# Patient Record
Sex: MALE | Race: WHITE | NOT HISPANIC OR LATINO | Employment: OTHER | URBAN - NONMETROPOLITAN AREA
[De-identification: names, ages, dates, MRNs, and addresses within clinical notes are randomized per-mention and may not be internally consistent; named-entity substitution may affect disease eponyms.]

---

## 2017-05-18 ENCOUNTER — OFFICE VISIT (OUTPATIENT)
Dept: GASTROENTEROLOGY | Facility: CLINIC | Age: 77
End: 2017-05-18

## 2017-05-18 VITALS
SYSTOLIC BLOOD PRESSURE: 132 MMHG | WEIGHT: 184 LBS | DIASTOLIC BLOOD PRESSURE: 72 MMHG | HEART RATE: 60 BPM | HEIGHT: 72 IN | TEMPERATURE: 96.9 F | OXYGEN SATURATION: 98 % | BODY MASS INDEX: 24.92 KG/M2

## 2017-05-18 DIAGNOSIS — K21.9 GASTROESOPHAGEAL REFLUX DISEASE, ESOPHAGITIS PRESENCE NOT SPECIFIED: Primary | ICD-10-CM

## 2017-05-18 PROCEDURE — 99213 OFFICE O/P EST LOW 20 MIN: CPT | Performed by: NURSE PRACTITIONER

## 2017-05-18 RX ORDER — BISOPROLOL FUMARATE AND HYDROCHLOROTHIAZIDE 5; 6.25 MG/1; MG/1
1 TABLET ORAL DAILY
COMMUNITY
End: 2020-05-04

## 2017-05-18 RX ORDER — FENOFIBRATE 160 MG/1
160 TABLET ORAL DAILY
COMMUNITY

## 2017-05-18 RX ORDER — NAPROXEN SODIUM 220 MG
220 TABLET ORAL DAILY
COMMUNITY
End: 2020-05-04

## 2017-05-18 RX ORDER — TRAMADOL HYDROCHLORIDE 50 MG/1
1 TABLET ORAL ONCE
COMMUNITY

## 2017-05-18 RX ORDER — OMEPRAZOLE 20 MG/1
20 CAPSULE, DELAYED RELEASE ORAL DAILY
COMMUNITY
End: 2017-05-18 | Stop reason: SDUPTHER

## 2017-05-18 RX ORDER — OMEPRAZOLE 20 MG/1
20 CAPSULE, DELAYED RELEASE ORAL DAILY
Qty: 30 CAPSULE | Refills: 11 | Status: SHIPPED | OUTPATIENT
Start: 2017-05-18 | End: 2018-04-27 | Stop reason: SDUPTHER

## 2017-05-22 DIAGNOSIS — R39.12 POOR URINARY STREAM: ICD-10-CM

## 2017-05-22 DIAGNOSIS — N13.8 BPH (BENIGN PROSTATIC HYPERTROPHY) WITH URINARY OBSTRUCTION: ICD-10-CM

## 2017-05-22 DIAGNOSIS — N40.1 BPH (BENIGN PROSTATIC HYPERTROPHY) WITH URINARY OBSTRUCTION: ICD-10-CM

## 2017-05-22 RX ORDER — TAMSULOSIN HYDROCHLORIDE 0.4 MG/1
CAPSULE ORAL
Qty: 30 CAPSULE | Refills: 3 | Status: SHIPPED | OUTPATIENT
Start: 2017-05-22 | End: 2017-09-19 | Stop reason: SDUPTHER

## 2017-09-13 DIAGNOSIS — N40.1 BPH (BENIGN PROSTATIC HYPERTROPHY) WITH URINARY OBSTRUCTION: Primary | ICD-10-CM

## 2017-09-13 DIAGNOSIS — N13.8 BPH (BENIGN PROSTATIC HYPERTROPHY) WITH URINARY OBSTRUCTION: Primary | ICD-10-CM

## 2017-09-13 LAB — PSA SERPL-MCNC: 0.52 NG/ML (ref 0–4)

## 2017-09-19 ENCOUNTER — OFFICE VISIT (OUTPATIENT)
Dept: UROLOGY | Facility: CLINIC | Age: 77
End: 2017-09-19

## 2017-09-19 VITALS
TEMPERATURE: 98.6 F | SYSTOLIC BLOOD PRESSURE: 128 MMHG | HEIGHT: 73 IN | BODY MASS INDEX: 24.78 KG/M2 | WEIGHT: 187 LBS | DIASTOLIC BLOOD PRESSURE: 90 MMHG

## 2017-09-19 DIAGNOSIS — N13.8 BPH (BENIGN PROSTATIC HYPERTROPHY) WITH URINARY OBSTRUCTION: Primary | ICD-10-CM

## 2017-09-19 DIAGNOSIS — N40.1 BPH (BENIGN PROSTATIC HYPERTROPHY) WITH URINARY OBSTRUCTION: Primary | ICD-10-CM

## 2017-09-19 DIAGNOSIS — R39.12 POOR URINARY STREAM: ICD-10-CM

## 2017-09-19 DIAGNOSIS — R97.20 ELEVATED PSA: ICD-10-CM

## 2017-09-19 LAB
BILIRUB BLD-MCNC: NEGATIVE MG/DL
CLARITY, POC: CLEAR
COLOR UR: YELLOW
GLUCOSE UR STRIP-MCNC: NEGATIVE MG/DL
KETONES UR QL: NEGATIVE
LEUKOCYTE EST, POC: NEGATIVE
NITRITE UR-MCNC: NEGATIVE MG/ML
PH UR: 5.5 [PH] (ref 5–8)
PROT UR STRIP-MCNC: NEGATIVE MG/DL
RBC # UR STRIP: NEGATIVE /UL
SP GR UR: 1.03 (ref 1–1.03)
UROBILINOGEN UR QL: NORMAL

## 2017-09-19 PROCEDURE — 81003 URINALYSIS AUTO W/O SCOPE: CPT | Performed by: UROLOGY

## 2017-09-19 PROCEDURE — 99213 OFFICE O/P EST LOW 20 MIN: CPT | Performed by: UROLOGY

## 2017-09-19 RX ORDER — TAMSULOSIN HYDROCHLORIDE 0.4 MG/1
1 CAPSULE ORAL DAILY
Qty: 90 CAPSULE | Refills: 5 | Status: SHIPPED | OUTPATIENT
Start: 2017-09-19 | End: 2018-09-25 | Stop reason: SDUPTHER

## 2017-09-19 NOTE — PROGRESS NOTES
Subjective    Mr. Velez is 77 y.o. male    CHIEF COMPLAINT: BPH    The patient comes back today follow-up of BPH clinically he is doing very very well he is very pleased with how things are going he does take the Flomax and feels that is helping him significantly he denies any gross hematuria there is no flank pain no recurrent urinary tract infections and his symptoms have been stable since we last saw him as a way score today is quite low at 2.    He does have a previous history of elevated PSA but his PSA today is 0.5 so again is quite low he has not had a previous biopsy but again obviously at this level would not recommend what his exam last year was perfectly normal.    He also has a history of urgency and some nocturia but again that is been stable as well      History of Present Illness      The following portions of the patient's history were reviewed and updated as appropriate: allergies, current medications, past family history, past medical history, past social history, past surgical history and problem list.    Review of Systems   Constitutional: Negative.  Negative for chills and fever.   Gastrointestinal: Negative for abdominal distention, abdominal pain, anal bleeding, blood in stool and nausea.   Genitourinary: Negative for difficulty urinating, dysuria, flank pain, frequency, hematuria and urgency.   Psychiatric/Behavioral: Negative.  Negative for agitation and confusion.         Current Outpatient Prescriptions:   •  bisoprolol-hydrochlorothiazide (ZIAC) 5-6.25 MG per tablet, Take 1 tablet by mouth Daily., Disp: , Rfl:   •  CALCIUM PO, Take  by mouth Daily., Disp: , Rfl:   •  fenofibrate 160 MG tablet, Take 160 mg by mouth Daily., Disp: , Rfl:   •  naproxen sodium (ALEVE) 220 MG tablet, Take 220 mg by mouth As Needed for Mild Pain (1-3)., Disp: , Rfl:   •  omeprazole (priLOSEC) 20 MG capsule, Take 1 capsule by mouth Daily., Disp: 30 capsule, Rfl: 11  •  tamsulosin (FLOMAX) 0.4 MG capsule 24 hr  "capsule, Take 1 capsule by mouth Daily., Disp: 90 capsule, Rfl: 5  •  TRAMADOL HCL PO, Take  by mouth., Disp: , Rfl:     Past Medical History:   Diagnosis Date   • Arthritis    • Diverticulosis    • Dysphagia    • GERD (gastroesophageal reflux disease)    • Hematochezia    • Hyperlipidemia    • Hypertension    • Plantar fasciitis    • Shingles    • Urticaria        Past Surgical History:   Procedure Laterality Date   • COLONOSCOPY  06/02/2016    5yr colon recall   • ENDOSCOPY  06/14/2011   • INGUINAL HERNIA REPAIR     • REPLACEMENT TOTAL KNEE     • SHOULDER SURGERY Bilateral        Social History     Social History   • Marital status:      Spouse name: N/A   • Number of children: N/A   • Years of education: N/A     Social History Main Topics   • Smoking status: Former Smoker   • Smokeless tobacco: Never Used   • Alcohol use Yes      Comment: occ   • Drug use: No   • Sexual activity: Not Asked     Other Topics Concern   • None     Social History Narrative       Family History   Problem Relation Age of Onset   • Crohn's disease Son    • No Known Problems Father    • No Known Problems Mother    • Colon cancer Neg Hx    • Colon polyps Neg Hx        Objective    /90  Temp 98.6 °F (37 °C)  Ht 73\" (185.4 cm)  Wt 187 lb (84.8 kg)  BMI 24.67 kg/m2    Physical Exam      Orders Only on 09/13/2017   Component Date Value Ref Range Status   • PSA 09/13/2017 0.520  0.000 - 4.000 ng/mL Final       Results for orders placed or performed in visit on 09/19/17   POC Urinalysis Dipstick, Automated   Result Value Ref Range    Color Yellow Yellow, Straw, Dark Yellow, Tamara    Clarity, UA Clear Clear    Glucose, UA Negative Negative, 1000 mg/dL (3+) mg/dL    Bilirubin Negative Negative    Ketones, UA Negative Negative    Specific Gravity  1.030 1.005 - 1.030    Blood, UA Negative Negative    pH, Urine 5.5 5.0 - 8.0    Protein, POC Negative Negative mg/dL    Urobilinogen, UA Normal Normal    Leukocytes Negative Negative    " Nitrite, UA Negative Negative       Assessment and Plan    Fabian was seen today for benign prostatic hypertrophy.    Diagnoses and all orders for this visit:    BPH (benign prostatic hypertrophy) with urinary obstruction  -     POC Urinalysis Dipstick, Automated  -     tamsulosin (FLOMAX) 0.4 MG capsule 24 hr capsule; Take 1 capsule by mouth Daily.    Poor urinary stream  -     tamsulosin (FLOMAX) 0.4 MG capsule 24 hr capsule; Take 1 capsule by mouth Daily.    Elevated PSA  -     PSA; Future    Plan--the patient seems doing quite well as far as his voiding symptoms I did refill his Flomax for him today some was seen back again in a year for his elevated PSA and repeated at that point of the has any difficulties prior then he will let me know

## 2017-09-24 ENCOUNTER — RESULTS ENCOUNTER (OUTPATIENT)
Dept: UROLOGY | Facility: CLINIC | Age: 77
End: 2017-09-24

## 2017-09-24 DIAGNOSIS — R97.20 ELEVATED PSA: ICD-10-CM

## 2018-03-23 ENCOUNTER — HOSPITAL ENCOUNTER (OUTPATIENT)
Dept: PREADMISSION TESTING | Age: 78
Setting detail: OUTPATIENT SURGERY
Discharge: HOME | End: 2018-03-27

## 2018-03-23 VITALS — WEIGHT: 175 LBS | BODY MASS INDEX: 23.7 KG/M2 | HEIGHT: 72 IN

## 2018-03-23 RX ORDER — DICLOFENAC SODIUM 75 MG/1
75 TABLET, DELAYED RELEASE ORAL DAILY
COMMUNITY
End: 2021-06-28

## 2018-03-23 RX ORDER — TAMSULOSIN HYDROCHLORIDE 0.4 MG/1
0.4 CAPSULE ORAL DAILY
COMMUNITY
End: 2021-03-10

## 2018-03-23 RX ORDER — FENOFIBRATE 160 MG/1
160 TABLET ORAL DAILY
COMMUNITY

## 2018-03-26 ENCOUNTER — ANESTHESIA EVENT (OUTPATIENT)
Dept: OPERATING ROOM | Age: 78
End: 2018-03-26

## 2018-03-29 ENCOUNTER — ANESTHESIA (OUTPATIENT)
Dept: OPERATING ROOM | Age: 78
End: 2018-03-29

## 2018-03-29 ENCOUNTER — HOSPITAL ENCOUNTER (OUTPATIENT)
Dept: GENERAL RADIOLOGY | Age: 78
Discharge: HOME | End: 2018-03-29

## 2018-03-29 ENCOUNTER — HOSPITAL ENCOUNTER (OUTPATIENT)
Age: 78
Setting detail: OUTPATIENT SURGERY
Discharge: HOME | End: 2018-03-29
Attending: ORTHOPAEDIC SURGERY | Admitting: ORTHOPAEDIC SURGERY

## 2018-03-29 VITALS
TEMPERATURE: 97.6 F | BODY MASS INDEX: 23.7 KG/M2 | HEART RATE: 56 BPM | WEIGHT: 175 LBS | HEIGHT: 72 IN | SYSTOLIC BLOOD PRESSURE: 124 MMHG | RESPIRATION RATE: 18 BRPM | DIASTOLIC BLOOD PRESSURE: 82 MMHG | OXYGEN SATURATION: 95 %

## 2018-03-29 VITALS — SYSTOLIC BLOOD PRESSURE: 116 MMHG | OXYGEN SATURATION: 95 % | DIASTOLIC BLOOD PRESSURE: 70 MMHG

## 2018-03-29 DIAGNOSIS — M25.552 LEFT HIP PAIN: ICD-10-CM

## 2018-03-29 PROCEDURE — G8907 PT DOC NO EVENTS ON DISCHARG: HCPCS

## 2018-03-29 PROCEDURE — 20610 DRAIN/INJ JOINT/BURSA W/O US: CPT

## 2018-03-29 PROCEDURE — G8918 PT W/O PREOP ORDER IV AB PRO: HCPCS

## 2018-03-29 PROCEDURE — 3209999900 FLUORO FOR SURGICAL PROCEDURES

## 2018-03-29 RX ORDER — ENALAPRILAT 2.5 MG/2ML
1.25 INJECTION INTRAVENOUS
Status: DISCONTINUED | OUTPATIENT
Start: 2018-03-29 | End: 2018-03-29 | Stop reason: HOSPADM

## 2018-03-29 RX ORDER — LIDOCAINE HYDROCHLORIDE 10 MG/ML
INJECTION, SOLUTION EPIDURAL; INFILTRATION; INTRACAUDAL; PERINEURAL PRN
Status: DISCONTINUED | OUTPATIENT
Start: 2018-03-29 | End: 2018-03-29 | Stop reason: HOSPADM

## 2018-03-29 RX ORDER — LABETALOL HYDROCHLORIDE 5 MG/ML
5 INJECTION, SOLUTION INTRAVENOUS EVERY 10 MIN PRN
Status: DISCONTINUED | OUTPATIENT
Start: 2018-03-29 | End: 2018-03-29 | Stop reason: HOSPADM

## 2018-03-29 RX ORDER — HYDROCODONE BITARTRATE AND ACETAMINOPHEN 5; 325 MG/1; MG/1
1 TABLET ORAL PRN
Status: DISCONTINUED | OUTPATIENT
Start: 2018-03-29 | End: 2018-03-29 | Stop reason: HOSPADM

## 2018-03-29 RX ORDER — PROMETHAZINE HYDROCHLORIDE 25 MG/ML
6.25 INJECTION, SOLUTION INTRAMUSCULAR; INTRAVENOUS
Status: DISCONTINUED | OUTPATIENT
Start: 2018-03-29 | End: 2018-03-29 | Stop reason: HOSPADM

## 2018-03-29 RX ORDER — HYDROCODONE BITARTRATE AND ACETAMINOPHEN 7.5; 325 MG/1; MG/1
1 TABLET ORAL EVERY 4 HOURS PRN
Qty: 10 TABLET | Refills: 0 | Status: SHIPPED | OUTPATIENT
Start: 2018-03-29 | End: 2018-04-05

## 2018-03-29 RX ORDER — DIPHENHYDRAMINE HYDROCHLORIDE 50 MG/ML
12.5 INJECTION INTRAMUSCULAR; INTRAVENOUS
Status: DISCONTINUED | OUTPATIENT
Start: 2018-03-29 | End: 2018-03-29 | Stop reason: HOSPADM

## 2018-03-29 RX ORDER — PROPOFOL 10 MG/ML
INJECTION, EMULSION INTRAVENOUS PRN
Status: DISCONTINUED | OUTPATIENT
Start: 2018-03-29 | End: 2018-03-29 | Stop reason: SDUPTHER

## 2018-03-29 RX ORDER — HYDROMORPHONE HCL 110MG/55ML
0.25 PATIENT CONTROLLED ANALGESIA SYRINGE INTRAVENOUS EVERY 5 MIN PRN
Status: DISCONTINUED | OUTPATIENT
Start: 2018-03-29 | End: 2018-03-29 | Stop reason: HOSPADM

## 2018-03-29 RX ORDER — SODIUM CHLORIDE, SODIUM LACTATE, POTASSIUM CHLORIDE, CALCIUM CHLORIDE 600; 310; 30; 20 MG/100ML; MG/100ML; MG/100ML; MG/100ML
INJECTION, SOLUTION INTRAVENOUS CONTINUOUS
Status: DISCONTINUED | OUTPATIENT
Start: 2018-03-29 | End: 2018-03-29 | Stop reason: HOSPADM

## 2018-03-29 RX ORDER — METOCLOPRAMIDE HYDROCHLORIDE 5 MG/ML
10 INJECTION INTRAMUSCULAR; INTRAVENOUS
Status: DISCONTINUED | OUTPATIENT
Start: 2018-03-29 | End: 2018-03-29 | Stop reason: HOSPADM

## 2018-03-29 RX ORDER — MORPHINE SULFATE 10 MG/ML
2 INJECTION, SOLUTION INTRAMUSCULAR; INTRAVENOUS EVERY 5 MIN PRN
Status: DISCONTINUED | OUTPATIENT
Start: 2018-03-29 | End: 2018-03-29 | Stop reason: HOSPADM

## 2018-03-29 RX ORDER — LIDOCAINE HYDROCHLORIDE 10 MG/ML
INJECTION, SOLUTION EPIDURAL; INFILTRATION; INTRACAUDAL; PERINEURAL PRN
Status: DISCONTINUED | OUTPATIENT
Start: 2018-03-29 | End: 2018-03-29 | Stop reason: SDUPTHER

## 2018-03-29 RX ORDER — MORPHINE SULFATE 10 MG/ML
4 INJECTION, SOLUTION INTRAMUSCULAR; INTRAVENOUS EVERY 5 MIN PRN
Status: DISCONTINUED | OUTPATIENT
Start: 2018-03-29 | End: 2018-03-29 | Stop reason: HOSPADM

## 2018-03-29 RX ORDER — HYDRALAZINE HYDROCHLORIDE 20 MG/ML
5 INJECTION INTRAMUSCULAR; INTRAVENOUS EVERY 10 MIN PRN
Status: DISCONTINUED | OUTPATIENT
Start: 2018-03-29 | End: 2018-03-29 | Stop reason: HOSPADM

## 2018-03-29 RX ORDER — MEPERIDINE HYDROCHLORIDE 25 MG/ML
12.5 INJECTION INTRAMUSCULAR; INTRAVENOUS; SUBCUTANEOUS EVERY 5 MIN PRN
Status: DISCONTINUED | OUTPATIENT
Start: 2018-03-29 | End: 2018-03-29 | Stop reason: HOSPADM

## 2018-03-29 RX ORDER — LIDOCAINE HYDROCHLORIDE 10 MG/ML
1 INJECTION, SOLUTION EPIDURAL; INFILTRATION; INTRACAUDAL; PERINEURAL
Status: DISCONTINUED | OUTPATIENT
Start: 2018-03-29 | End: 2018-03-29 | Stop reason: HOSPADM

## 2018-03-29 RX ORDER — HYDROCODONE BITARTRATE AND ACETAMINOPHEN 5; 325 MG/1; MG/1
2 TABLET ORAL PRN
Status: DISCONTINUED | OUTPATIENT
Start: 2018-03-29 | End: 2018-03-29 | Stop reason: HOSPADM

## 2018-03-29 RX ORDER — HYDROMORPHONE HCL 110MG/55ML
0.5 PATIENT CONTROLLED ANALGESIA SYRINGE INTRAVENOUS EVERY 5 MIN PRN
Status: DISCONTINUED | OUTPATIENT
Start: 2018-03-29 | End: 2018-03-29 | Stop reason: HOSPADM

## 2018-03-29 RX ORDER — DEXAMETHASONE SODIUM PHOSPHATE 10 MG/ML
INJECTION INTRAMUSCULAR; INTRAVENOUS PRN
Status: DISCONTINUED | OUTPATIENT
Start: 2018-03-29 | End: 2018-03-29 | Stop reason: HOSPADM

## 2018-03-29 RX ORDER — BUPIVACAINE HYDROCHLORIDE 5 MG/ML
INJECTION, SOLUTION EPIDURAL; INTRACAUDAL PRN
Status: DISCONTINUED | OUTPATIENT
Start: 2018-03-29 | End: 2018-03-29 | Stop reason: HOSPADM

## 2018-03-29 RX ADMIN — LIDOCAINE HYDROCHLORIDE 5 ML: 10 INJECTION, SOLUTION EPIDURAL; INFILTRATION; INTRACAUDAL; PERINEURAL at 08:25

## 2018-03-29 RX ADMIN — PROPOFOL 70 MG: 10 INJECTION, EMULSION INTRAVENOUS at 08:25

## 2018-03-29 RX ADMIN — SODIUM CHLORIDE, SODIUM LACTATE, POTASSIUM CHLORIDE, CALCIUM CHLORIDE: 600; 310; 30; 20 INJECTION, SOLUTION INTRAVENOUS at 07:30

## 2018-03-29 RX ADMIN — PROPOFOL 100 MG: 10 INJECTION, EMULSION INTRAVENOUS at 08:28

## 2018-03-29 NOTE — ANESTHESIA PRE PROCEDURE
ARTHROPLASTY      left       Social History:    Social History   Substance Use Topics    Smoking status: Never Smoker    Smokeless tobacco: Current User     Types: Snuff    Alcohol use Yes      Comment: beer- mod                                Ready to quit: Not Answered  Counseling given: Not Answered      Vital Signs (Current):   Vitals:    03/29/18 0724   BP: (!) 148/95   Pulse: 67   Resp: 18   Temp: 97.8 °F (36.6 °C)   SpO2: 95%   Weight: 175 lb (79.4 kg)   Height: 6' (1.829 m)                                              BP Readings from Last 3 Encounters:   03/29/18 (!) 148/95       NPO Status: Time of last liquid consumption: 2300                        Time of last solid consumption: 2300                        Date of last liquid consumption: 03/28/18                        Date of last solid food consumption: 03/28/18    BMI:   Wt Readings from Last 3 Encounters:   03/29/18 175 lb (79.4 kg)   03/23/18 175 lb (79.4 kg)     Body mass index is 23.73 kg/m². CBC: No results found for: WBC, RBC, HGB, HCT, MCV, RDW, PLT    CMP: No results found for: NA, K, CL, CO2, BUN, CREATININE, GFRAA, AGRATIO, LABGLOM, GLUCOSE, PROT, CALCIUM, BILITOT, ALKPHOS, AST, ALT    POC Tests: No results for input(s): POCGLU, POCNA, POCK, POCCL, POCBUN, POCHEMO, POCHCT in the last 72 hours. Coags: No results found for: PROTIME, INR, APTT    HCG (If Applicable): No results found for: PREGTESTUR, PREGSERUM, HCG, HCGQUANT     ABGs: No results found for: PHART, PO2ART, IKX8RIY, APB3DFF, BEART, [id number]     Type & Screen (If Applicable):  No results found for: LABABO, [address]    Anesthesia Evaluation  Patient summary reviewed and Nursing notes reviewed no history of anesthetic complications:   Airway: Mallampati: II        Dental:    (+) edentulous      Pulmonary:Negative Pulmonary ROS and normal exam              Patient did not smoke on day of surgery.                  Cardiovascular:    (+) hypertension: no interval change, hyperlipidemia         Beta Blocker:  Not on Beta Blocker         Neuro/Psych:   Negative Neuro/Psych ROS              GI/Hepatic/Renal:   (+) GERD:,           Endo/Other: Negative Endo/Other ROS             Pt had PAT visit. Abdominal:           Vascular: negative vascular ROS. Anesthesia Plan      MAC     ASA 2       Induction: intravenous. Anesthetic plan and risks discussed with patient and spouse.                       Kyle Doan CRNA   3/29/2018

## 2018-03-30 NOTE — ANESTHESIA POSTPROCEDURE EVALUATION
Department of Anesthesiology  Postprocedure Note    Patient: Leonel Caballero  MRN: [id number]  YOB: 1940  Date of evaluation: 3/30/2018  Time:  9:03 AM     Procedure Summary     Date:  03/29/18 Room / Location:  [facility] OR  / [facility] OR    Anesthesia Start:  5628 Anesthesia Stop:  4076    Procedure:  HIP INJECTION (Left Wrist) Diagnosis:  ( )    Surgeon:  Mohamud Samson DO Responsible Provider:  Radha Keene CRNA    Anesthesia Type:  MAC ASA Status:  2          Anesthesia Type: MAC    Thelma Phase I:      Thelma Phase II: Thelma Score: 10    Last vitals: Reviewed and per EMR flowsheets.        Anesthesia Post Evaluation    Patient location during evaluation: bedside  Patient participation: complete - patient participated  Level of consciousness: responsive to light touch  Pain score: 0  Airway patency: patent  Nausea & Vomiting: no vomiting and no nausea  Complications: no  Cardiovascular status: hemodynamically stable  Respiratory status: acceptable and room air  Hydration status: stable

## 2018-04-30 RX ORDER — OMEPRAZOLE 20 MG/1
CAPSULE, DELAYED RELEASE ORAL
Qty: 30 CAPSULE | Refills: 0 | Status: SHIPPED | OUTPATIENT
Start: 2018-04-30 | End: 2018-05-30 | Stop reason: SDUPTHER

## 2018-05-30 RX ORDER — OMEPRAZOLE 20 MG/1
20 CAPSULE, DELAYED RELEASE ORAL DAILY
Qty: 30 CAPSULE | Refills: 1 | Status: SHIPPED | OUTPATIENT
Start: 2018-05-30 | End: 2018-07-24 | Stop reason: SDUPTHER

## 2018-07-10 ENCOUNTER — ANESTHESIA EVENT (OUTPATIENT)
Dept: OPERATING ROOM | Age: 78
End: 2018-07-10

## 2018-07-12 ENCOUNTER — ANESTHESIA (OUTPATIENT)
Dept: OPERATING ROOM | Age: 78
End: 2018-07-12

## 2018-07-12 ENCOUNTER — HOSPITAL ENCOUNTER (OUTPATIENT)
Age: 78
Setting detail: OUTPATIENT SURGERY
Discharge: HOME | End: 2018-07-12
Attending: ORTHOPAEDIC SURGERY | Admitting: ORTHOPAEDIC SURGERY

## 2018-07-12 ENCOUNTER — HOSPITAL ENCOUNTER (OUTPATIENT)
Dept: GENERAL RADIOLOGY | Age: 78
Discharge: HOME | End: 2018-07-12

## 2018-07-12 VITALS — DIASTOLIC BLOOD PRESSURE: 75 MMHG | OXYGEN SATURATION: 97 % | SYSTOLIC BLOOD PRESSURE: 117 MMHG

## 2018-07-12 VITALS
WEIGHT: 182 LBS | OXYGEN SATURATION: 96 % | HEART RATE: 58 BPM | HEIGHT: 72 IN | RESPIRATION RATE: 18 BRPM | TEMPERATURE: 97.5 F | SYSTOLIC BLOOD PRESSURE: 132 MMHG | BODY MASS INDEX: 24.65 KG/M2 | DIASTOLIC BLOOD PRESSURE: 73 MMHG

## 2018-07-12 DIAGNOSIS — M16.9 OSTEOARTHROSIS, HIP: Primary | ICD-10-CM

## 2018-07-12 DIAGNOSIS — M25.552 LEFT HIP PAIN: ICD-10-CM

## 2018-07-12 PROCEDURE — G8918 PT W/O PREOP ORDER IV AB PRO: HCPCS

## 2018-07-12 PROCEDURE — G8907 PT DOC NO EVENTS ON DISCHARG: HCPCS

## 2018-07-12 PROCEDURE — 20610 DRAIN/INJ JOINT/BURSA W/O US: CPT

## 2018-07-12 PROCEDURE — 3209999900 FLUORO FOR SURGICAL PROCEDURES

## 2018-07-12 RX ORDER — HYDROCODONE BITARTRATE AND ACETAMINOPHEN 7.5; 325 MG/1; MG/1
1 TABLET ORAL EVERY 4 HOURS PRN
Qty: 10 TABLET | Refills: 0 | Status: SHIPPED | OUTPATIENT
Start: 2018-07-12 | End: 2018-07-19

## 2018-07-12 RX ORDER — LIDOCAINE HYDROCHLORIDE 10 MG/ML
INJECTION, SOLUTION INFILTRATION; PERINEURAL PRN
Status: DISCONTINUED | OUTPATIENT
Start: 2018-07-12 | End: 2018-07-12 | Stop reason: SDUPTHER

## 2018-07-12 RX ORDER — SODIUM CHLORIDE, SODIUM LACTATE, POTASSIUM CHLORIDE, CALCIUM CHLORIDE 600; 310; 30; 20 MG/100ML; MG/100ML; MG/100ML; MG/100ML
INJECTION, SOLUTION INTRAVENOUS CONTINUOUS
Status: DISCONTINUED | OUTPATIENT
Start: 2018-07-12 | End: 2018-07-12 | Stop reason: HOSPADM

## 2018-07-12 RX ORDER — PROPOFOL 10 MG/ML
INJECTION, EMULSION INTRAVENOUS PRN
Status: DISCONTINUED | OUTPATIENT
Start: 2018-07-12 | End: 2018-07-12 | Stop reason: SDUPTHER

## 2018-07-12 RX ADMIN — SODIUM CHLORIDE, SODIUM LACTATE, POTASSIUM CHLORIDE, CALCIUM CHLORIDE: 600; 310; 30; 20 INJECTION, SOLUTION INTRAVENOUS at 08:02

## 2018-07-12 RX ADMIN — PROPOFOL 100 MG: 10 INJECTION, EMULSION INTRAVENOUS at 10:03

## 2018-07-12 RX ADMIN — LIDOCAINE HYDROCHLORIDE 50 MG: 10 INJECTION, SOLUTION INFILTRATION; PERINEURAL at 10:03

## 2018-07-12 NOTE — ANESTHESIA PRE PROCEDURE
Department of Anesthesiology  Preprocedure Note       Name:  Beth Brown   Age:  69 y.o.  :  1940                                          MRN:  [id number]         Date:  2018      Surgeon: Ovidio Gutiérrez):  Kirstie Orellana DO    Procedure: Procedure(s):  HIP INJECTION    Medications prior to admission:   Prior to Admission medications    Medication Sig Start Date End Date Taking? Authorizing Provider   Naproxen Sodium (ALEVE PO) Take by mouth daily    Historical Provider, MD   fenofibrate 160 MG tablet Take 160 mg by mouth daily    Historical Provider, MD   TRAMADOL HCL PO Take by mouth. Historical Provider, MD   diclofenac (VOLTAREN) 75 MG EC tablet Take 75 mg by mouth daily    Historical Provider, MD   MAGNESIUM PO Take by mouth    Historical Provider, MD   tamsulosin (FLOMAX) 0.4 MG capsule Take 0.4 mg by mouth daily    Historical Provider, MD   omeprazole (PRILOSEC) 20 MG capsule Take 20 mg by mouth daily. Historical Provider, MD   bisoprolol-hydrochlorothiazide [facility]) 5-6.25 MG per tablet Take 1 tablet by mouth daily. Historical Provider, MD       Current medications:    No current facility-administered medications for this visit. No current outpatient prescriptions on file. Facility-Administered Medications Ordered in Other Visits   Medication Dose Route Frequency Provider Last Rate Last Dose    lactated ringers infusion   Intravenous Continuous Kirstie Orellana  mL/hr at 18 0802         Allergies:     Allergies   Allergen Reactions    Red Dye        Problem List:    Patient Active Problem List   Diagnosis Code    Osteoarthrosis, hip M16.9    Bone spur M77.9    Hip pain, left M25.552       Past Medical History:        Diagnosis Date    DJD (degenerative joint disease) of hip     GERD (gastroesophageal reflux disease)     Hypertension        Past Surgical History:        Procedure Laterality Date    CHG FLUOROSCOPIC GUIDANCE NEEDLE PLACEMENT ADD ON Left 3/29/2018    HIP INJECTION performed by Lorne Isaac DO at [address] [facility] ARTHROSCOPY      bilateral    TOTAL KNEE ARTHROPLASTY      left       Social History:    Social History   Substance Use Topics    Smoking status: Never Smoker    Smokeless tobacco: Current User     Types: Snuff    Alcohol use Yes      Comment: beer- mod                                Ready to quit: Not Answered  Counseling given: Not Answered      Vital Signs (Current): There were no vitals filed for this visit. BP Readings from Last 3 Encounters:   07/12/18 (!) 147/93   03/29/18 116/70   03/29/18 124/82       NPO Status:                                                                                 BMI:   Wt Readings from Last 3 Encounters:   07/12/18 182 lb (82.6 kg)   03/29/18 175 lb (79.4 kg)   03/23/18 175 lb (79.4 kg)     There is no height or weight on file to calculate BMI.    CBC: No results found for: WBC, RBC, HGB, HCT, MCV, RDW, PLT    CMP: No results found for: NA, K, CL, CO2, BUN, CREATININE, GFRAA, AGRATIO, LABGLOM, GLUCOSE, PROT, CALCIUM, BILITOT, ALKPHOS, AST, ALT    POC Tests: No results for input(s): POCGLU, POCNA, POCK, POCCL, POCBUN, POCHEMO, POCHCT in the last 72 hours. Coags: No results found for: PROTIME, INR, APTT    HCG (If Applicable): No results found for: PREGTESTUR, PREGSERUM, HCG, HCGQUANT     ABGs: No results found for: PHART, PO2ART, PEP3UWV, MDW0ESD, BEART, [id number]     Type & Screen (If Applicable):  No results found for: LABAB [facility]    Anesthesia Evaluation  Patient summary reviewed and Nursing notes reviewed no history of anesthetic complications:   Airway: Mallampati: II        Dental:    (+) edentulous, upper dentures and lower dentures      Pulmonary:Negative Pulmonary ROS and normal exam              Patient did not smoke on day of surgery.                  Cardiovascular:    (+) hypertension: no interval change,

## 2018-07-12 NOTE — OP NOTE
OPERATIVE NOTE  [facility] OF [address]      NAME OF SURGEON: Cherry Hutchinson D.O.    PATIENT:   Wendie Manley      Date: 7/12/2018            Time: 10:08 AM       PREOP DIAGNOSIS: Primary end-stage osteoarthritis left hip    POSTOP DIAGNOSIS:  Same     PROCEDURE: Corticosteroid and local anesthetic injection left hip with fluoroscopic visualization    IMPLANTS: * No implants in log *    FINDINGS: None  ASSISTANT: None  ANESTHESIA:  Conscious sedation with monitored anesthesia care  EBL: Minimal    FLUIDS: See anesthesia record  BLOOD PRODUCTS:  None  COMPLICATIONS:  None  SPECIMEN:  None        INDICATIONS:  Patient presents for the above procedure having failed conservative treatment. Patient consents to the procedure above understanding the risks of bleeding, infection, anesthesia, nerve injury, stiffness, and blood clots. Procedure in Detail: After informed consent was obtained patient operates suite and placed on the operating room table in the supine position. After successful induction of conscious sedation with monitored anesthesia care left hip was prepped in normal sterile fashion. With the assistance of fluoroscopic visualization a 18-gauge spinal needle was directed into the left hip joint. One mL of 1% lidocaine and 1 mL of quarter percent Marcaine and 1 mL of dexamethasone was injected into the left hip joint. Spinal needle was removed as entirety. Dressing was applied. Patient was transported to the recovery room in stable condition. Plan:   Activities as tolerated    Electronically signed by Cherry Hutchinson DO on 7/12/2018 at 10:08 AM

## 2018-07-12 NOTE — H&P
[facility] ([facility]) Pre-Operative History and Physical    Patient Name: Deborah Roawn  : 1940        Chief complaint: Right hip pain    History of Present Illness:   69 y.o. male seen and evaluated in the office with complaints of left hip pain    Radiographic and clinical evaluation reveals primary end-stage osteoarthritis left hip    Past Medical Hisotry:          Diagnosis Date    DJD (degenerative joint disease) of hip     GERD (gastroesophageal reflux disease)     Hypertension        Past Surgical History:          Procedure Laterality Date    CHG FLUOROSCOPIC GUIDANCE NEEDLE PLACEMENT ADD ON Left 3/29/2018    HIP INJECTION performed by Sonya Fierro DO at [address] [facility] ARTHROSCOPY      bilateral    TOTAL KNEE ARTHROPLASTY      left       Medications:      Prior to Admission medications    Medication Sig Start Date End Date Taking? Authorizing Provider   Naproxen Sodium (ALEVE PO) Take by mouth daily    Historical Provider, MD   fenofibrate 160 MG tablet Take 160 mg by mouth daily    Historical Provider, MD   TRAMADOL HCL PO Take by mouth. Historical Provider, MD   diclofenac (VOLTAREN) 75 MG EC tablet Take 75 mg by mouth daily    Historical Provider, MD   MAGNESIUM PO Take by mouth    Historical Provider, MD   tamsulosin (FLOMAX) 0.4 MG capsule Take 0.4 mg by mouth daily    Historical Provider, MD   omeprazole (PRILOSEC) 20 MG capsule Take 20 mg by mouth daily. Historical Provider, MD   bisoprolol-hydrochlorothiazide [facility]) 5-6.25 MG per tablet Take 1 tablet by mouth daily. Historical Provider, MD       Allergies:     Red dye    Social History:   Tobacco:  reports that he has never smoked. His smokeless tobacco use includes Snuff. Alcohol:  reports that he drinks alcohol.     Review of Systems:  General: Denies any fever or chills  EYES: Denies any diplopia  ENT: Tinnitus or vertigo  Resp: Denies any shortness of breath, cough or wheezing  Cardiac: Denies any chest pain, palpitations, claudication or edema  GI: Denies any melena, hematochezia, hematemesis or pyrosis  : Denies any frequency, urgency, hesitancy or incontinence  Musculoskeletal: Denies back pain, joint pain, myalgias  Heme: Denies bruising or bleeding easily  Endocrine: Denies any history of diabetes or thyroid disease  Psych: Denies anxiety or depression  Neuro: Denies any focal motor or sensory deficits      Physical Exam:  Vitals: There were no vitals taken for this visit. CONSTITUTIONAL: Alert, appropriate, no acute distress. PSYCH: mood and affect are normal with a normal rate and tone of speech  EYES: Non icteric, EOM intact, pupils equal round and reactive to light  ENT: Mucus membranes moist, no oral pharyngeal lesions, nares patent   NECK: Supple, no masses, no JVD, trachea mid line   CHEST/LUNGS: CTA bilaterally, normal respiratory effort   CARDIOVASCULAR: RRR, no murmurs,  2+ DP and radial pulses bilaterally  ABDOMEN: soft, nontender  EXTREMITIES: warm, well perfused, no edema. Joint with mildly reduced range of motion and generalized tenderness.   Neurovascular exam normal  SKIN: warm, dry with no rashes or lesions  LYMPH: No cervical or inguinal lymphadenopathy    Laboratory:  CBC :  No results found for: WBC, HGB, HCT, PLT  BMP: No results found for: NA, K, CL, CO2, BUN, LABALBU, CREATININE, CALCIUM, GFRAA, LABGLOM, GLUCOSE  PT/INR:  No results found for: PROTIME, INR  U/A: No results found for: NITRITE, WBCUA, RBCUA, BACTERIA  HgBA1c:  No components found for: HGBA1C    Radiology: Plain x-rays of the right hip reveal joint space narrowing subchondral cyst formation osteophyte formation consistent with end-stage primary osteoarthritis the right hip    IMPRESSION:  Primary end-stage osteoarthritis left hip    PLAN: A lengthy discussion was carried out with the patient and the patient has agreed to proceed with the purposed alternate operative procedure of corticosteroid and local anesthetic injection with fluoroscopic visualization left hip    Permit and pre-operative orders where completed in the office. Patient will be re-evaluated in the pre-operative holding area.         Provider: Zaki Joshua  Date: 7/12/2018

## 2018-07-24 ENCOUNTER — OFFICE VISIT (OUTPATIENT)
Dept: GASTROENTEROLOGY | Facility: CLINIC | Age: 78
End: 2018-07-24

## 2018-07-24 VITALS
SYSTOLIC BLOOD PRESSURE: 130 MMHG | DIASTOLIC BLOOD PRESSURE: 70 MMHG | HEART RATE: 64 BPM | OXYGEN SATURATION: 98 % | HEIGHT: 72 IN | BODY MASS INDEX: 24.92 KG/M2 | TEMPERATURE: 97 F | WEIGHT: 184 LBS

## 2018-07-24 DIAGNOSIS — K21.9 GASTROESOPHAGEAL REFLUX DISEASE, ESOPHAGITIS PRESENCE NOT SPECIFIED: Primary | ICD-10-CM

## 2018-07-24 PROCEDURE — 99212 OFFICE O/P EST SF 10 MIN: CPT | Performed by: NURSE PRACTITIONER

## 2018-07-24 RX ORDER — OMEPRAZOLE 20 MG/1
20 CAPSULE, DELAYED RELEASE ORAL DAILY
Qty: 90 CAPSULE | Refills: 3 | Status: SHIPPED | OUTPATIENT
Start: 2018-07-24 | End: 2019-07-15 | Stop reason: SDUPTHER

## 2018-07-24 NOTE — PROGRESS NOTES
[facility] GASTROENTEROLOGY - OFFICE NOTE    7/24/2018    Fabian Velez Sr.   1940    Primary Physician: Kaleb Villavicencio MD    Chief Complaint   Patient presents with   • Heartburn         HISTORY OF PRESENT ILLNESS    Fabian Velez Sr. is a 77 y.o. male presents  with gerd. Taking omeprazole 20 mg daily with good control. No dysphagia or odynophagia. No n/v. No fever or chills. No abdominal pain.  No weight loss. Appetitie is good. Needs refill of omeprazole.  Does not take calcium suppl.          Ov 5-18-17 Fabian Velez Sr. is a 76 y.o. male presents with gerd. Gerd x 3 yrs. Stable now. On omeprazole 20 mg one by mouth every day with control. As dysphagia or odynophagia.  Denies nausea vomiting.  Denies hematemesis.  Denies fevers or chills.  Denies unintentional weight loss.  Last EGD was June 2011 which was normal.  He does need a refill of his omeprazole.    Past Medical History:   Diagnosis Date   • Arthritis    • Diverticulosis    • GERD (gastroesophageal reflux disease)    • Hyperlipidemia    • Hypertension    • Plantar fasciitis    • Shingles    • Urticaria        Past Surgical History:   Procedure Laterality Date   • COLONOSCOPY  06/02/2016    5yr colon recall   • ENDOSCOPY  06/14/2011   • INGUINAL HERNIA REPAIR     • REPLACEMENT TOTAL KNEE     • SHOULDER SURGERY Bilateral        Outpatient Prescriptions Marked as Taking for the 7/24/18 encounter (Office Visit) with TAI Fowler   Medication Sig Dispense Refill   • bisoprolol-hydrochlorothiazide (ZIAC) 5-6.25 MG per tablet Take 1 tablet by mouth Daily.     • DICLOFENAC PO Take  by mouth Daily.     • fenofibrate 160 MG tablet Take 160 mg by mouth As Needed.     • naproxen sodium (ALEVE) 220 MG tablet Take 220 mg by mouth Daily.     • omeprazole (priLOSEC) 20 MG capsule Take 1 capsule by mouth Daily. 90 capsule 3   • tamsulosin (FLOMAX) 0.4 MG capsule 24 hr capsule Take 1 capsule by mouth Daily. 90 capsule 5   • TRAMADOL HCL PO Take  by  mouth.     • [DISCONTINUED] omeprazole (priLOSEC) 20 MG capsule Take 1 capsule by mouth Daily. 30 capsule 1       Allergies   Allergen Reactions   • Contrast Dye Other (See Comments)     Pt can't recall       Social History     Social History   • Marital status:      Spouse name: N/A   • Number of children: N/A   • Years of education: N/A     Occupational History   • Not on file.     Social History Main Topics   • Smoking status: Former Smoker   • Smokeless tobacco: Never Used   • Alcohol use Yes      Comment: occ   • Drug use: No   • Sexual activity: Defer     Other Topics Concern   • Not on file     Social History Narrative   • No narrative on file       Family History   Problem Relation Age of Onset   • Crohn's disease Son    • No Known Problems Father    • No Known Problems Mother    • Colon cancer Neg Hx    • Colon polyps Neg Hx        Review of Systems   Constitutional: Negative for appetite change, chills, fatigue, fever and unexpected weight change.   HENT: Negative for sore throat and trouble swallowing.    Eyes: Negative for visual disturbance.   Respiratory: Negative for cough, chest tightness, shortness of breath and wheezing.    Cardiovascular: Negative for chest pain and palpitations.   Gastrointestinal: Negative for abdominal distention, abdominal pain, anal bleeding, blood in stool, constipation, diarrhea, nausea, rectal pain and vomiting.        As mentioned in hpi   Genitourinary: Negative for difficulty urinating and hematuria.   Musculoskeletal: Negative for arthralgias and back pain.   Skin: Negative for color change and rash.   Neurological: Negative for dizziness, seizures, syncope, light-headedness and headaches.   Hematological: Negative for adenopathy.   Psychiatric/Behavioral: Negative for confusion. The patient is not nervous/anxious.         Vitals:    07/24/18 1317   BP: 130/70   BP Location: Left arm   Patient Position: Sitting   Cuff Size: Adult   Pulse: 64   Temp: 97 °F (36.1  "°C)   SpO2: 98%   Weight: 83.5 kg (184 lb)   Height: 182.9 cm (72\")      Body mass index is 24.95 kg/m².    Physical Exam   Constitutional: He appears well-developed and well-nourished. No distress.   HENT:   Head: Normocephalic and atraumatic.   Eyes: EOM are normal. No scleral icterus.   Neck: Neck supple. No JVD present.   Cardiovascular: Normal rate, regular rhythm and normal heart sounds.    Pulmonary/Chest: Effort normal and breath sounds normal. No stridor.   Abdominal: Soft. Bowel sounds are normal. He exhibits no distension and no mass. There is no tenderness. There is no rebound and no guarding.   Musculoskeletal: Normal range of motion. He exhibits no deformity.   Neurological: He is alert.   Skin: Skin is warm and dry. No rash noted.   Psychiatric: He has a normal mood and affect. His behavior is normal.   Vitals reviewed.      Results for orders placed or performed in visit on 09/19/17   POC Urinalysis Dipstick, Automated   Result Value Ref Range    Color Yellow Yellow, Straw, Dark Yellow, Tamara    Clarity, UA Clear Clear    Glucose, UA Negative Negative, 1000 mg/dL (3+) mg/dL    Bilirubin Negative Negative    Ketones, UA Negative Negative    Specific Gravity  1.030 1.005 - 1.030    Blood, UA Negative Negative    pH, Urine 5.5 5.0 - 8.0    Protein, POC Negative Negative mg/dL    Urobilinogen, UA Normal Normal    Leukocytes Negative Negative    Nitrite, UA Negative Negative           ASSESSMENT AND PLAN    Assessment/Plan     Fabian was seen today for heartburn.    Diagnoses and all orders for this visit:    Gastroesophageal reflux disease, esophagitis presence not specified    Other orders  -     omeprazole (priLOSEC) 20 MG capsule; Take 1 capsule by mouth Daily.    strict anti reflux precautions. Will refill omeprazole.  Recommended taking calcium suppl jay jay due to ppi can decrease calcium absorption in the bones.            Body mass index is 24.95 kg/m².    Patient's Body mass index is 24.95 kg/m². " BMI is within normal parameters. No follow-up required.       Return in about 1 year (around 7/24/2019).        TAI Carmen    EMR Dragon/transcription disclaimer:  Much of this encounter note is electronic transcription/translation of spoken language to printed text.  The electronic translation of spoken language may be erroneous, or at times, nonsensical words or phrases may be inadvertently transcribed.  Although I have reviewed the note for such errors, some may still exist.

## 2018-09-17 DIAGNOSIS — R97.20 ELEVATED PROSTATE SPECIFIC ANTIGEN (PSA): Primary | ICD-10-CM

## 2018-09-19 LAB
PSA FREE MFR SERPL: 27.1 %
PSA FREE SERPL-MCNC: 0.38 NG/ML
PSA SERPL-MCNC: 1.4 NG/ML (ref 0–4)

## 2018-09-25 ENCOUNTER — OFFICE VISIT (OUTPATIENT)
Dept: UROLOGY | Facility: CLINIC | Age: 78
End: 2018-09-25

## 2018-09-25 VITALS
BODY MASS INDEX: 25.06 KG/M2 | SYSTOLIC BLOOD PRESSURE: 128 MMHG | HEIGHT: 72 IN | TEMPERATURE: 98.6 F | DIASTOLIC BLOOD PRESSURE: 68 MMHG | WEIGHT: 185 LBS

## 2018-09-25 DIAGNOSIS — N40.1 BENIGN PROSTATIC HYPERPLASIA WITH LOWER URINARY TRACT SYMPTOMS, SYMPTOM DETAILS UNSPECIFIED: ICD-10-CM

## 2018-09-25 DIAGNOSIS — R39.12 POOR URINARY STREAM: ICD-10-CM

## 2018-09-25 DIAGNOSIS — R97.20 ELEVATED PROSTATE SPECIFIC ANTIGEN (PSA): Primary | ICD-10-CM

## 2018-09-25 DIAGNOSIS — R97.20 ELEVATED PSA: ICD-10-CM

## 2018-09-25 LAB
BILIRUB BLD-MCNC: ABNORMAL MG/DL
CLARITY, POC: CLEAR
COLOR UR: YELLOW
GLUCOSE UR STRIP-MCNC: NEGATIVE MG/DL
KETONES UR QL: ABNORMAL
LEUKOCYTE EST, POC: NEGATIVE
NITRITE UR-MCNC: NEGATIVE MG/ML
PH UR: 5.5 [PH] (ref 5–8)
PROT UR STRIP-MCNC: NEGATIVE MG/DL
RBC # UR STRIP: NEGATIVE /UL
SP GR UR: 1.02 (ref 1–1.03)
UROBILINOGEN UR QL: NORMAL

## 2018-09-25 PROCEDURE — 99213 OFFICE O/P EST LOW 20 MIN: CPT | Performed by: UROLOGY

## 2018-09-25 PROCEDURE — 81001 URINALYSIS AUTO W/SCOPE: CPT | Performed by: UROLOGY

## 2018-09-25 RX ORDER — TAMSULOSIN HYDROCHLORIDE 0.4 MG/1
1 CAPSULE ORAL DAILY
Qty: 90 CAPSULE | Refills: 5 | Status: SHIPPED | OUTPATIENT
Start: 2018-09-25 | End: 2019-09-30 | Stop reason: SDUPTHER

## 2018-09-25 NOTE — PROGRESS NOTES
Subjective    Mr. Velez is 78 y.o. male    CHIEF COMPLAINT: BPH\    The patient comes in today for follow-up of BPH he seems to doing quite well as a way score today is only 2 he does take Flomax.    He Is Not Sure Whether He Needs Medication and I Told Him Really If He Wanted to Go Ahead and Stop It He Will No Relatively Quickly Whether It Is Beneficial for Him or Not and Certainly If Failure There Is No Change in His Symptoms He Can Stay off of It.    He Denies Any Gross Hematuria There Is No Recurrent Urinary Tract Infections Etc.    He Also Has a Previous History of Elevated PSA Although They Have Been Normal Recently Most Recently His PSA Is 1.4 and Not Perfectly Normal      History of Present Illness      The following portions of the patient's history were reviewed and updated as appropriate: allergies, current medications, past family history, past medical history, past social history, past surgical history and problem list.    Review of Systems   Constitutional: Negative.    Respiratory: Negative for cough and choking.    Gastrointestinal: Negative for abdominal distention, abdominal pain, blood in stool and nausea.   Genitourinary: Positive for urgency. Negative for difficulty urinating, dysuria, flank pain, frequency and hematuria.   Psychiatric/Behavioral: Negative.  Negative for agitation and confusion.   All other systems reviewed and are negative.        Current Outpatient Prescriptions:   •  bisoprolol-hydrochlorothiazide (ZIAC) 5-6.25 MG per tablet, Take 1 tablet by mouth Daily., Disp: , Rfl:   •  DICLOFENAC PO, Take  by mouth Daily., Disp: , Rfl:   •  fenofibrate 160 MG tablet, Take 160 mg by mouth As Needed., Disp: , Rfl:   •  naproxen sodium (ALEVE) 220 MG tablet, Take 220 mg by mouth Daily., Disp: , Rfl:   •  omeprazole (priLOSEC) 20 MG capsule, Take 1 capsule by mouth Daily., Disp: 90 capsule, Rfl: 3  •  tamsulosin (FLOMAX) 0.4 MG capsule 24 hr capsule, Take 1 capsule by mouth Daily., Disp: 90  "capsule, Rfl: 5  •  TRAMADOL HCL PO, Take  by mouth., Disp: , Rfl:     Past Medical History:   Diagnosis Date   • Arthritis    • Diverticulosis    • GERD (gastroesophageal reflux disease)    • Hyperlipidemia    • Hypertension    • Plantar fasciitis    • Shingles    • Urticaria        Past Surgical History:   Procedure Laterality Date   • COLONOSCOPY  06/02/2016    5yr colon recall   • ENDOSCOPY  06/14/2011   • INGUINAL HERNIA REPAIR     • REPLACEMENT TOTAL KNEE     • SHOULDER SURGERY Bilateral        Social History     Social History   • Marital status:      Social History Main Topics   • Smoking status: Former Smoker   • Smokeless tobacco: Never Used   • Alcohol use Yes      Comment: occ   • Drug use: No   • Sexual activity: Defer     Other Topics Concern   • Not on file       Family History   Problem Relation Age of Onset   • Crohn's disease Son    • No Known Problems Father    • No Known Problems Mother    • Colon cancer Neg Hx    • Colon polyps Neg Hx        Objective    /68   Temp 98.6 °F (37 °C)   Ht 182.9 cm (72\")   Wt 83.9 kg (185 lb)   BMI 25.09 kg/m²     Physical Exam   Constitutional: He is oriented to person, place, and time. He appears well-developed and well-nourished.   Pulmonary/Chest: Effort normal.   Abdominal: Soft. He exhibits no distension and no mass. There is no tenderness. There is no rebound and no guarding. No hernia. Hernia confirmed negative in the right inguinal area and confirmed negative in the left inguinal area.   Genitourinary: Testes normal and penis normal. Rectal exam shows no mass, no tenderness and anal tone normal. Enlarged: for the age of the patient. Right testis shows no mass, no swelling and no tenderness. Left testis shows no mass, no swelling and no tenderness. Uncircumcised. No hypospadias. No discharge found.   Genitourinary Comments:  The urethral meatus normal in position without evidence of stricture. Epididymis without mass or tenderness. Vas " Deferens is palpably normal.Anus and perineum without mass or tenderness. The prostate is approximately 35 ml. It is Symmetric, with a Soft consistency. There are no nodules present. . The seminal vesicles are Not palpable due to the size of the prostate.     Neurological: He is alert and oriented to person, place, and time.   Vitals reviewed.        Orders Only on 09/17/2018   Component Date Value Ref Range Status   • PSA 09/17/2018 1.4  0.0 - 4.0 ng/mL Final    Comment: Roche ECLIA methodology.  According to the American Urological Association, Serum PSA should  decrease and remain at undetectable levels after radical  prostatectomy. The AUA defines biochemical recurrence as an initial  PSA value 0.2 ng/mL or greater followed by a subsequent confirmatory  PSA value 0.2 ng/mL or greater.  Values obtained with different assay methods or kits cannot be used  interchangeably. Results cannot be interpreted as absolute evidence  of the presence or absence of malignant disease.     • PSA, Free 09/17/2018 0.38  N/A ng/mL Final    Roche ECLIA methodology.   • PSA, Free % 09/17/2018 27.1  % Final    Comment: The table below lists the probability of prostate cancer for  men with non-suspicious ELIZABETH results and total PSA between  4 and 10 ng/mL, by patient age (Massiel et al, NARESH 1998,  279:1542).                    % Free PSA       50-64 yr        65-75 yr                    0.00-10.00%        56%             55%                   10.01-15.00%        24%             35%                   15.01-20.00%        17%             23%                   20.01-25.00%        10%             20%                        >25.00%         5%              9%  Please note:  Massiel et al did not make specific                recommendations regarding the use of                percent free PSA for any other population                of men.         Results for orders placed or performed in visit on 09/25/18   POC Urinalysis Dipstick, Multipro    Result Value Ref Range    Color Yellow Yellow, Straw, Dark Yellow, Tamara    Clarity, UA Clear Clear    Glucose, UA Negative Negative, 1000 mg/dL (3+) mg/dL    Bilirubin Small (1+) (A) Negative    Ketones, UA Trace (A) Negative    Specific Gravity  1.025 1.005 - 1.030    Blood, UA Negative Negative    pH, Urine 5.5 5.0 - 8.0    Protein, POC Negative Negative mg/dL    Urobilinogen, UA Normal Normal    Nitrite, UA Negative Negative    Leukocytes Negative Negative       Assessment and Plan    Fabian was seen today for follow-up.    Diagnoses and all orders for this visit:    Elevated prostate specific antigen (PSA)  -     POC Urinalysis Dipstick, Multipro    Elevated PSA  -     POC Urinalysis Dipstick, Multipro  -     PSA DIAGNOSTIC; Future    Poor urinary stream  -     POC Urinalysis Dipstick, Multipro  -     tamsulosin (FLOMAX) 0.4 MG capsule 24 hr capsule; Take 1 capsule by mouth Daily.    Benign prostatic hyperplasia with lower urinary tract symptoms, symptom details unspecified    Plan--far as his BPH is concerned I did go ahead and refill his Flomax for him but if he would like to try to stop it seeking stay off of that certainly would be fine and he can also restarted as needed.    His PSA is normal so from elevated PSA point review we will just check another one again in a year.    He is still working and fired Department so we will see me here

## 2019-07-16 RX ORDER — OMEPRAZOLE 20 MG/1
20 CAPSULE, DELAYED RELEASE ORAL DAILY
Qty: 90 CAPSULE | Refills: 0 | Status: SHIPPED | OUTPATIENT
Start: 2019-07-16 | End: 2019-10-11 | Stop reason: SDUPTHER

## 2019-07-23 ENCOUNTER — OFFICE VISIT (OUTPATIENT)
Dept: GASTROENTEROLOGY | Facility: CLINIC | Age: 79
End: 2019-07-23

## 2019-07-23 VITALS
TEMPERATURE: 96.7 F | BODY MASS INDEX: 23.84 KG/M2 | HEART RATE: 66 BPM | OXYGEN SATURATION: 98 % | WEIGHT: 176 LBS | HEIGHT: 72 IN | SYSTOLIC BLOOD PRESSURE: 110 MMHG | DIASTOLIC BLOOD PRESSURE: 70 MMHG

## 2019-07-23 DIAGNOSIS — K21.9 GASTROESOPHAGEAL REFLUX DISEASE, ESOPHAGITIS PRESENCE NOT SPECIFIED: Primary | ICD-10-CM

## 2019-07-23 PROCEDURE — 99212 OFFICE O/P EST SF 10 MIN: CPT | Performed by: INTERNAL MEDICINE

## 2019-07-23 NOTE — PROGRESS NOTES
[facility] Gastroenterology    Chief Complaint   Patient presents with   • Heartburn       Subjective     HPI    Fabian Velez Sr. is a 78 y.o. male who presents with a chief complaint of heartburn.    He comes in for an annual visit.  He states he is not really having trouble with heartburn.  In the past he had trouble dysphasia.  Years ago he did have epigastric burning and heartburn but has not had trouble since being on omeprazole.  He takes his omeprazole every day.  He has not missed the dose.  He is not trying to get off of it.  He denies any dysphasia.  He states everything is going well for him.  States he is planning to go back to work this coming October.  He states he is looking for something to do to keep himself occupied.  He works with the [employer] industry.      =====================7/24/2018  Hpi===========================================    HISTORY OF PRESENT ILLNESS     Fabian Velez Sr. is a 77 y.o. male presents  with gerd. Taking omeprazole 20 mg daily with good control. No dysphagia or odynophagia. No n/v. No fever or chills. No abdominal pain.  No weight loss. Appetitie is good. Needs refill of omeprazole.  Does not take calcium suppl.            Ov 5-18-17 Fabian Velez Sr. is a 76 y.o. male presents with gerd. Gerd x 3 yrs. Stable now. On omeprazole 20 mg one by mouth every day with control. As dysphagia or odynophagia.  Denies nausea vomiting.  Denies hematemesis.  Denies fevers or chills.  Denies unintentional weight loss.  Last EGD was June 2011 which was normal.  He does need a refill of his omeprazole.             Past Medical History:   Diagnosis Date   • Arthritis    • Diverticulosis    • GERD (gastroesophageal reflux disease)    • Hyperlipidemia    • Hypertension    • Plantar fasciitis    • Shingles    • Urticaria        Past Surgical History:   Procedure Laterality Date   • COLONOSCOPY  06/02/2016    5yr colon recall based on prep adequate to identify polyps greater than 6 mm    • ENDOSCOPY  06/14/2011   • INGUINAL HERNIA REPAIR     • REPLACEMENT TOTAL KNEE     • SHOULDER SURGERY Bilateral          Current Outpatient Medications:   •  bisoprolol-hydrochlorothiazide (ZIAC) 5-6.25 MG per tablet, Take 1 tablet by mouth Daily., Disp: , Rfl:   •  DICLOFENAC PO, Take  by mouth Daily., Disp: , Rfl:   •  fenofibrate 160 MG tablet, Take 160 mg by mouth As Needed., Disp: , Rfl:   •  naproxen sodium (ALEVE) 220 MG tablet, Take 220 mg by mouth Daily., Disp: , Rfl:   •  omeprazole (priLOSEC) 20 MG capsule, Take 1 capsule by mouth Daily., Disp: 90 capsule, Rfl: 0  •  tamsulosin (FLOMAX) 0.4 MG capsule 24 hr capsule, Take 1 capsule by mouth Daily., Disp: 90 capsule, Rfl: 5  •  TRAMADOL HCL PO, Take  by mouth., Disp: , Rfl:     Allergies   Allergen Reactions   • Contrast Dye Other (See Comments)     Pt can't recall       Social History     Socioeconomic History   • Marital status:      Spouse name: Not on file   • Number of children: Not on file   • Years of education: Not on file   • Highest education level: Not on file   Tobacco Use   • Smoking status: Former Smoker   • Smokeless tobacco: Never Used   Substance and Sexual Activity   • Alcohol use: Yes     Comment: occ   • Drug use: No   • Sexual activity: Defer       Family History   Problem Relation Age of Onset   • Crohn's disease Son    • No Known Problems Father    • No Known Problems Mother    • Colon cancer Neg Hx    • Colon polyps Neg Hx        Review of Systems  General no fever chills or sweats weight stable  Gastrointestinal: Not present-abdominal pain, constipation, diarrhea, dysphagia, hematemesis, melena, odynophagia, nausea, vomiting, pyrosis, regurgitation, hematochezia,    Objective     Vitals:    07/23/19 1507   BP: 110/70   Pulse: 66   Temp: 96.7 °F (35.9 °C)   SpO2: 98%       Physical Exam  No acute distress. Vital signs as documented. Skin warm and dry and without overt rashes. EOMI, sclera anicteric.  Neck without JVD or  masses. Lungs clear to auscultation bilaterally, no rales. Heart exam notable for regular rhythm, normal sounds and absence of loud murmurs, rubs or gallops. Abdomen is soft, nontender, non distended, normal bowel sounds and without evidence of organomegaly, masses, or abdominal aortic enlargement. Extremities nonedematous, no cyanosis. Neuro alert, moves extremities.        Assessment/Plan   Problem List Items Addressed This Visit        Digestive    Gastroesophageal reflux disease - Primary            Regards to his reflux disease and a history of esophageal stricture he is asymptomatic.  I talked about the option of weaning off the omeprazole and using an over-the-counter H2 blocker as needed.  We talked about the pros and cons of this.  We talked about the side effects of the medications.  At this time he wants to stay on the omeprazole as he is done quite well with the.  He does not want take any chances he states.  We have refilled his omeprazole prescription.  I think that is reasonable.  I did reinforce reflux precautions as well.  Also suggest calcium supplements.    He is up-to-date on his colonoscopies he will be due in 2021.    He is going come back and see us in 1 year.  Sooner if needed.    Continue ongoing management by primary care provider and other specialists.     Patient's Body mass index is 23.87 kg/m². BMI is within normal parameters. No follow-up required..        EMR Dragon/transcription disclaimer:  Much of this encounter note is electronic transcription/translation of spoken language to printed text.  The electronic translation of spoken language may be erroneous, or at times, nonsensical words or phrases may be inadvertently transcribed.  Although I have reviewed the note for such errors, some may still exist.    Antonio Salgado MD  4:02 PM  07/23/19

## 2019-09-23 DIAGNOSIS — R97.20 ELEVATED PROSTATE SPECIFIC ANTIGEN (PSA): Primary | ICD-10-CM

## 2019-09-25 ENCOUNTER — RESULTS ENCOUNTER (OUTPATIENT)
Dept: UROLOGY | Facility: CLINIC | Age: 79
End: 2019-09-25

## 2019-09-25 DIAGNOSIS — R97.20 ELEVATED PSA: ICD-10-CM

## 2019-09-30 DIAGNOSIS — R39.12 POOR URINARY STREAM: ICD-10-CM

## 2019-09-30 RX ORDER — TAMSULOSIN HYDROCHLORIDE 0.4 MG/1
1 CAPSULE ORAL DAILY
Qty: 90 CAPSULE | Refills: 2 | Status: SHIPPED | OUTPATIENT
Start: 2019-09-30 | End: 2020-07-01 | Stop reason: SDUPTHER

## 2019-10-13 RX ORDER — OMEPRAZOLE 20 MG/1
CAPSULE, DELAYED RELEASE ORAL
Qty: 90 CAPSULE | Refills: 2 | Status: SHIPPED | OUTPATIENT
Start: 2019-10-13 | End: 2020-04-07

## 2019-12-09 ENCOUNTER — RESULTS ENCOUNTER (OUTPATIENT)
Dept: UROLOGY | Facility: CLINIC | Age: 79
End: 2019-12-09

## 2019-12-09 DIAGNOSIS — R97.20 ELEVATED PROSTATE SPECIFIC ANTIGEN (PSA): ICD-10-CM

## 2019-12-13 DIAGNOSIS — R97.20 ELEVATED PROSTATE SPECIFIC ANTIGEN (PSA): Primary | ICD-10-CM

## 2019-12-16 ENCOUNTER — RESULTS ENCOUNTER (OUTPATIENT)
Dept: UROLOGY | Facility: CLINIC | Age: 79
End: 2019-12-16

## 2019-12-16 DIAGNOSIS — R97.20 ELEVATED PROSTATE SPECIFIC ANTIGEN (PSA): ICD-10-CM

## 2019-12-17 LAB — PSA SERPL-MCNC: 1.77 NG/ML (ref 0–4)

## 2019-12-17 NOTE — PROGRESS NOTES
Subjective    Mr. Velez is 79 y.o. male    Chief Complaint: Elevated PSA    History of Present Illness  79-year-old male annual follow-up for enlarged prostate formerly seen by Dr. Gary.  Severity stable.  Quality painless.  Timing chronic.  Onset gradual.  Context LUTS currently well controlled on tamsulosin.  PSA this year on 12/16/2019 normal at 1.77.  Associated symptoms denies hematuria, dysuria, or flank pain.    I spent time today reviewing and summarizing old records last urology clinic visit with Dr. Gary 9/25/2018.      Lab Results   Component Value Date    PSA 1.770 12/16/2019    PSA 1.4 09/17/2018    PSA 0.520 09/13/2017       The following portions of the patient's history were reviewed and updated as appropriate: allergies, current medications, past family history, past medical history, past social history, past surgical history and problem list.    Review of Systems   Constitutional: Negative for chills and fever.   Gastrointestinal: Negative for abdominal pain, anal bleeding and blood in stool.   Genitourinary: Negative for dysuria, frequency, hematuria and urgency.   All other systems reviewed and are negative.        Current Outpatient Medications:   •  bisoprolol-hydrochlorothiazide (ZIAC) 5-6.25 MG per tablet, Take 1 tablet by mouth Daily., Disp: , Rfl:   •  DICLOFENAC PO, Take  by mouth Daily., Disp: , Rfl:   •  fenofibrate 160 MG tablet, Take 160 mg by mouth As Needed., Disp: , Rfl:   •  naproxen sodium (ALEVE) 220 MG tablet, Take 220 mg by mouth Daily., Disp: , Rfl:   •  omeprazole (priLOSEC) 20 MG capsule, TAKE ONE CAPSULE BY MOUTH ONCE DAILY., Disp: 90 capsule, Rfl: 2  •  tamsulosin (FLOMAX) 0.4 MG capsule 24 hr capsule, Take 1 capsule by mouth Daily., Disp: 90 capsule, Rfl: 2  •  TRAMADOL HCL PO, Take  by mouth., Disp: , Rfl:     Past Medical History:   Diagnosis Date   • Arthritis    • Diverticulosis    • GERD (gastroesophageal reflux disease)    • Hyperlipidemia    • Hypertension   "  • Plantar fasciitis    • Shingles    • Urticaria        Past Surgical History:   Procedure Laterality Date   • COLONOSCOPY  06/02/2016    5yr colon recall based on prep adequate to identify polyps greater than 6 mm   • ENDOSCOPY  06/14/2011   • INGUINAL HERNIA REPAIR     • REPLACEMENT TOTAL KNEE     • SHOULDER SURGERY Bilateral        Social History     Socioeconomic History   • Marital status:      Spouse name: Not on file   • Number of children: Not on file   • Years of education: Not on file   • Highest education level: Not on file   Tobacco Use   • Smoking status: Former Smoker   • Smokeless tobacco: Never Used   Substance and Sexual Activity   • Alcohol use: Yes     Comment: occ   • Drug use: No   • Sexual activity: Defer       Family History   Problem Relation Age of Onset   • Crohn's disease Son    • No Known Problems Father    • No Known Problems Mother    • Colon cancer Neg Hx    • Colon polyps Neg Hx        Objective    Temp 98.2 °F (36.8 °C)   Ht 182.9 cm (72\")   Wt 81.2 kg (179 lb)   BMI 24.28 kg/m²     Physical Exam  Constitutional: Well nourished, Well developed; No apparent distress.  His vital signs are reviewed  Psychiatric: Appropriate affect; Alert and oriented  Eyes: Unremarkable  Musculoskeletal: Normal gait and station  GI: Abdomen is soft, non-tender  Respiratory: No distress; Unlabored movement; No accessory musculature needed with symmetric movements  Skin: No pallor or diaphoresis  ; Penis and testicles are normal; Prostate 35-40 mL without nodule      Results for orders placed or performed in visit on 12/23/19   POC Urinalysis Dipstick, Multipro   Result Value Ref Range    Color Yellow Yellow, Straw, Dark Yellow, Tamara    Clarity, UA Clear Clear    Glucose, UA Negative Negative, 1000 mg/dL (3+) mg/dL    Bilirubin Negative Negative    Ketones, UA Negative Negative    Specific Gravity  1.020 1.005 - 1.030    Blood, UA Negative Negative    pH, Urine 5.5 5.0 - 8.0    Protein, POC " Negative Negative mg/dL    Urobilinogen, UA Normal Normal    Nitrite, UA Negative Negative    Leukocytes Negative Negative     Patient's Body mass index is 24.28 kg/m². BMI is within normal parameters. No follow-up required..    Assessment and Plan    Diagnoses and all orders for this visit:    Urine frequency  -     POC Urinalysis Dipstick, Multipro    Gastroesophageal reflux disease, esophagitis presence not specified    Poor urinary stream      Normal exam, PSA, and UA today.  LUTS well-controlled on tamsulosin-continue.  Given his age and PSA stability, I recommended no further routine PSA screening per AUA guidelines to which he concurs.  Follow-up with me in 1 year but with no PSA.

## 2019-12-23 ENCOUNTER — OFFICE VISIT (OUTPATIENT)
Dept: UROLOGY | Facility: CLINIC | Age: 79
End: 2019-12-23

## 2019-12-23 VITALS — BODY MASS INDEX: 24.24 KG/M2 | WEIGHT: 179 LBS | TEMPERATURE: 98.2 F | HEIGHT: 72 IN

## 2019-12-23 DIAGNOSIS — R39.12 POOR URINARY STREAM: ICD-10-CM

## 2019-12-23 DIAGNOSIS — K21.9 GASTROESOPHAGEAL REFLUX DISEASE, ESOPHAGITIS PRESENCE NOT SPECIFIED: ICD-10-CM

## 2019-12-23 DIAGNOSIS — R35.0 URINE FREQUENCY: Primary | ICD-10-CM

## 2019-12-23 LAB
BILIRUB BLD-MCNC: NEGATIVE MG/DL
CLARITY, POC: CLEAR
COLOR UR: YELLOW
GLUCOSE UR STRIP-MCNC: NEGATIVE MG/DL
KETONES UR QL: NEGATIVE
LEUKOCYTE EST, POC: NEGATIVE
NITRITE UR-MCNC: NEGATIVE MG/ML
PH UR: 5.5 [PH] (ref 5–8)
PROT UR STRIP-MCNC: NEGATIVE MG/DL
RBC # UR STRIP: NEGATIVE /UL
SP GR UR: 1.02 (ref 1–1.03)
UROBILINOGEN UR QL: NORMAL

## 2019-12-23 PROCEDURE — 99213 OFFICE O/P EST LOW 20 MIN: CPT | Performed by: UROLOGY

## 2019-12-23 PROCEDURE — 81001 URINALYSIS AUTO W/SCOPE: CPT | Performed by: UROLOGY

## 2020-03-30 RX ORDER — DILTIAZEM HYDROCHLORIDE 240 MG/1
240 CAPSULE, COATED, EXTENDED RELEASE ORAL DAILY
Qty: 30 CAPSULE | Refills: 11 | Status: SHIPPED | OUTPATIENT
Start: 2020-03-30 | End: 2020-11-05 | Stop reason: SDUPTHER

## 2020-03-30 NOTE — PROGRESS NOTES
Patient was new referral for afib.  He was diagnosed in January and is on eliquis and short-acting diltiazem (60mg po q6hr).  He voices no concerning symptoms, but is asking if there are other medicine options besides the q6hr diltiazem.      He also asked about holding the blood thinner for a series of back injections.    I will send in new Rx for extended-release diltiazem 240mg daily to his pharmacy, with instructions to stop taking the short-acting one.      I did advise that he continue Eliquis as rx'ed, and advised against holding it for an extended period of time to get the back injections.    He will be scheduled for a new pt appt ASAP when concerns re:COVID-19 spread have subsided.

## 2020-04-07 DIAGNOSIS — K21.9 GASTROESOPHAGEAL REFLUX DISEASE, ESOPHAGITIS PRESENCE NOT SPECIFIED: Primary | ICD-10-CM

## 2020-04-08 RX ORDER — OMEPRAZOLE 20 MG/1
CAPSULE, DELAYED RELEASE ORAL
Qty: 90 CAPSULE | Refills: 0 | Status: SHIPPED | OUTPATIENT
Start: 2020-04-08 | End: 2020-09-28 | Stop reason: SDUPTHER

## 2020-04-29 RX ORDER — SIMVASTATIN 10 MG
10 TABLET ORAL NIGHTLY
COMMUNITY

## 2020-05-04 ENCOUNTER — OFFICE VISIT (OUTPATIENT)
Dept: CARDIOLOGY | Facility: CLINIC | Age: 80
End: 2020-05-04

## 2020-05-04 VITALS
BODY MASS INDEX: 24.11 KG/M2 | WEIGHT: 178 LBS | SYSTOLIC BLOOD PRESSURE: 126 MMHG | HEART RATE: 62 BPM | DIASTOLIC BLOOD PRESSURE: 72 MMHG | OXYGEN SATURATION: 98 % | HEIGHT: 72 IN

## 2020-05-04 DIAGNOSIS — M54.50 CHRONIC LOW BACK PAIN, UNSPECIFIED BACK PAIN LATERALITY, UNSPECIFIED WHETHER SCIATICA PRESENT: ICD-10-CM

## 2020-05-04 DIAGNOSIS — I10 ESSENTIAL HYPERTENSION: ICD-10-CM

## 2020-05-04 DIAGNOSIS — G89.29 CHRONIC LOW BACK PAIN, UNSPECIFIED BACK PAIN LATERALITY, UNSPECIFIED WHETHER SCIATICA PRESENT: ICD-10-CM

## 2020-05-04 DIAGNOSIS — I48.0 PAROXYSMAL ATRIAL FIBRILLATION (HCC): Primary | ICD-10-CM

## 2020-05-04 PROBLEM — I48.91 ATRIAL FIBRILLATION (HCC): Status: ACTIVE | Noted: 2020-05-04

## 2020-05-04 PROCEDURE — 93000 ELECTROCARDIOGRAM COMPLETE: CPT | Performed by: INTERNAL MEDICINE

## 2020-05-04 PROCEDURE — 99204 OFFICE O/P NEW MOD 45 MIN: CPT | Performed by: INTERNAL MEDICINE

## 2020-05-04 NOTE — PROGRESS NOTES
"    P - CARDIOLOGY  New Patient Initial Outpatient [facility]    Primary Care Physician: Kaleb Villavicencio MD    Subjective     Chief Complaint: new onset afib referral    History of Present Illness   78 yo who \"started feeling funny\" when waiting in the lobby of the [facility] (was there with a family member) in January '20 (duration: 4 months), characterized as light-headedness (associated symptom).  Was found to be in AF/RVR, so he was admitted and started on diltiazem infusion (alleviating factor), and woke up the next morning feeling normal. Has not felt like that at all since (progression of symptoms: resolved).  Has been compliant with eliquis ever since, and was d/c'ed home on short-acting diltiazem that was later transitioned to once-daily.     Currently awaiting a series of 4 injections into his back for pain; injections would be one every 2 weeks.    Review of Systems   HENT: Negative for nosebleeds.    Cardiovascular: Negative for chest pain, claudication, dyspnea on exertion, irregular heartbeat, leg swelling, near-syncope, orthopnea, palpitations, paroxysmal nocturnal dyspnea and syncope.   Respiratory: Negative for cough, shortness of breath and wheezing.    Hematologic/Lymphatic: Negative for bleeding problem. Does not bruise/bleed easily.   Musculoskeletal: Positive for back pain.   Gastrointestinal: Negative for dysphagia, hematemesis, hematochezia and melena.   Genitourinary: Negative for hematuria and non-menstrual bleeding.   Otherwise complete ROS reviewed and negative except as mentioned in the HPI.      Past Medical History:   Past Medical History:   Diagnosis Date   • Arthritis    • Atrial fibrillation (CMS/HCC)    • Diverticulosis    • GERD (gastroesophageal reflux disease)    • Hyperlipidemia    • Hypertension    • Plantar fasciitis    • Shingles    • Urticaria        Past Surgical History:  Past Surgical History:   Procedure Laterality Date   • COLONOSCOPY  06/02/2016    5yr colon " recall based on prep adequate to identify polyps greater than 6 mm   • ENDOSCOPY  06/14/2011   • INGUINAL HERNIA REPAIR     • REPLACEMENT TOTAL KNEE     • SHOULDER SURGERY Bilateral        Family History: family history includes Cancer in his mother; Crohn's disease in his son; Heart disease in his father; Stroke in his brother.    Social History:  reports that he quit smoking about 3 years ago. His smokeless tobacco use includes chew. He reports that he drinks alcohol. He reports that he does not use drugs.    Medications:  Prior to Admission medications    Medication Sig Start Date End Date Taking? Authorizing Provider   apixaban (ELIQUIS) 5 MG tablet tablet Take 5 mg by mouth 2 (Two) Times a Day.   Yes Alejandro Chino MD   Cholecalciferol (VITAMIN D3) 125 MCG (5000 UT) capsule capsule Take 5,000 Units by mouth Daily.   Yes Alejandro Chino MD   dilTIAZem CD (CARDIZEM CD) 240 MG 24 hr capsule Take 1 capsule by mouth Daily. 3/30/20  Yes Jeremie Lala MD   fenofibrate 160 MG tablet Take 160 mg by mouth Daily.   Yes Alejandro Chino MD   metoprolol tartrate (LOPRESSOR) 25 MG tablet Take 25 mg by mouth 2 (Two) Times a Day.   Yes Alejandro Chino MD   omeprazole (priLOSEC) 20 MG capsule TAKE ONE CAPSULE BY MOUTH ONCE DAILY. 4/8/20  Yes Delmy De La Vega APRN   simvastatin (ZOCOR) 10 MG tablet Take 10 mg by mouth Every Night.   Yes Alejandro Chino MD   tamsulosin (FLOMAX) 0.4 MG capsule 24 hr capsule Take 1 capsule by mouth Daily. 9/30/19  Yes Eyal Euceda MD   traMADol (ULTRAM) 50 MG tablet Take 1 tablet by mouth 3 (Three) Times a Day.   Yes Alejandro Chino MD   bisoprolol-hydrochlorothiazide (ZIAC) 5-6.25 MG per tablet Take 1 tablet by mouth Daily.  5/4/20  Alejandro Chino MD   DICLOFENAC PO Take  by mouth Daily.  5/4/20  Alejandro Chino MD   naproxen sodium (ALEVE) 220 MG tablet Take 220 mg by mouth Daily.  5/4/20  Alejandro Chino MD     Allergies:  Allergies   "  Allergen Reactions   • Contrast Dye Other (See Comments)     CHEST PAIN       Objective     Vital Signs: /72 (BP Location: Left arm, Patient Position: Sitting)   Pulse 62   Ht 182.9 cm (72\")   Wt 80.7 kg (178 lb)   SpO2 98%   BMI 24.14 kg/m²     Physical Exam   Constitutional: No distress.   HENT:   Mouth/Throat: Oropharynx is clear. Pharynx is normal.   Neck: Normal range of motion and thyroid normal. Neck supple. No JVD present. No thyromegaly present.   Cardiovascular: Normal rate, regular rhythm, S1 normal, S2 normal, normal heart sounds, intact distal pulses and normal pulses.  No extrasystoles are present. PMI is not displaced.   No murmur heard.  Pulmonary/Chest: Effort normal and breath sounds normal.   Abdominal: Soft. Bowel sounds are normal. He exhibits no distension. There is no splenomegaly or hepatomegaly. There is no tenderness.   Musculoskeletal: He exhibits no edema or tenderness.   Neurological: He is alert and oriented to person, place, and time.   Skin: Skin is warm and dry.       Results Reviewed:      ECG 12 Lead  Date/Time: 5/4/2020 10:24 AM  Performed by: Jeremie Lala MD  Authorized by: Jeremie Lala MD   Comparison: not compared with previous ECG   Previous ECG: no previous ECG available  Rhythm: sinus rhythm  Rate: normal  BPM: 72    Clinical impression: normal ECG            Echocardiogram report reviewed.  Study performed 1/21/2020.  LVEF 68% with mild left ventricular hypertrophy, normal size and function of RV, no significant valvular pathology.  Both left and right atria are reported    Echocardiogram report reviewed, from study obtained 1/21/2020.  LVEF 68% with mild LVH.  Left and right atria both said to be normal in size.  Normal size and function of the RV.  No significant valvular pathology noted.  Assessment / Plan        Problem List Items Addressed This Visit (all new to me)       Cardiovascular and Mediastinum    Atrial fibrillation (CMS/HCC) - Primary: " Stable    Overview     Diagnosed Jan '20 - was symptomatic at time of diagnosis with AF/RVR (rate 150s) - was light-headed    CHADS-VASc Risk Assessment            3       Total Score        1 Hypertension    2 Age >/= 75        Criteria that do not apply:    CHF    DM    PRIOR STROKE/TIA/THROMBO    Vascular Disease    Age 65-74    Sex: Female                 Relevant Orders    Holter Monitor - 72 Hour Up To 21 Days    Hypertension: Well-controlled       Nervous and Auditory    Low back pain: Significantly limiting, in need of series of back injections        Recommendations and plans:  -Continue Eliquis 5 mg twice daily for CVA prophylaxis  -Continue current dose of beta-blocker     -Obtain 14-day ZIO patch to assess for burden or possibility of occult atrial fibrillation.  These findings will help guide decision regarding what to do with anticoagulation as it pertains to his upcoming need for a series of 4 sequential back injections to help with his debilitating back pain.      -Given the significant limitation his back pain imposes on his quality of life, certainly I would like for him to be able to complete the therapy of back injections to help him, and would be okay with him holding Eliquis to do so.  However, I would prefer to minimize his time off Eliquis as much as possible.  If his treating providers performing the back injections are able to do these with him taking aspirin, that would be at least some CVA prophylaxis when he is off Eliquis.  I have asked the patient to inquire from his other treating physicians a) whether the back injections can be performed when he is taking 81 mg of aspirin daily, and B) exactly how long he would have to be off Eliquis before getting the injection.   -Once we know the answers to those 2 questions, and see what his burden of occult atrial fibrillation is on the monitor, we can then advise appropriately regarding anticoagulant strategy to get him through the series of  back injections.    Otherwise, no changes in treatment recommended at this point time.        Jeremie Lala MD   05/04/20   11:05

## 2020-05-29 ENCOUNTER — TELEPHONE (OUTPATIENT)
Dept: CARDIOLOGY | Facility: CLINIC | Age: 80
End: 2020-05-29

## 2020-05-29 NOTE — TELEPHONE ENCOUNTER
Patient called and left a voicemail requesting his most recent test results from Dr. Lala.  He can be reached at [phone].  Thank you!

## 2020-05-29 NOTE — TELEPHONE ENCOUNTER
I let patient know that Dr Jose has not read it yet and as soon as he does I will call patient back. He verbalized understanding.

## 2020-06-03 ENCOUNTER — TELEPHONE (OUTPATIENT)
Dept: CARDIOLOGY | Facility: CLINIC | Age: 80
End: 2020-06-03

## 2020-06-04 NOTE — TELEPHONE ENCOUNTER
Spoke with patient and read results of monitor to him. He said he has not had any more symptoms of lightheadedness or palpitations.

## 2020-06-04 NOTE — TELEPHONE ENCOUNTER
Patient called back again today and left a voicemail asking to speak with Dr. Lala's assistant.  I assume this is still regarding his results.  He did not leave any details. He can be reached at [phone].  Thank you!

## 2020-06-22 ENCOUNTER — TELEPHONE (OUTPATIENT)
Dept: CARDIOLOGY | Facility: CLINIC | Age: 80
End: 2020-06-22

## 2020-06-22 NOTE — TELEPHONE ENCOUNTER
Yes - would be low risk for surgery.  Should hold eliquis per surgeon discretion (usually just 48 hours beforehand), and resume as soon as is safe from a surgical perspective afterwards

## 2020-06-22 NOTE — TELEPHONE ENCOUNTER
Patient called to report he would be having a knee replacement with Dr. Salvador Zendejas.  It has not been scheduled yet, but Dr. Zendejas's office advised the patient to call and start the process for surgical clearance and to get directions on holding Eliquis.  Patient can be reached at [phone].  Thank you!

## 2020-06-22 NOTE — TELEPHONE ENCOUNTER
Patient is needing surgical clearance for knee surgery. And will instructions be the same for Eliquis as they are for back injections? Please advise.

## 2020-07-01 ENCOUNTER — TELEPHONE (OUTPATIENT)
Dept: UROLOGY | Facility: CLINIC | Age: 80
End: 2020-07-01

## 2020-07-01 DIAGNOSIS — R39.12 POOR URINARY STREAM: ICD-10-CM

## 2020-07-01 RX ORDER — TAMSULOSIN HYDROCHLORIDE 0.4 MG/1
1 CAPSULE ORAL DAILY
Qty: 90 CAPSULE | Refills: 2 | Status: SHIPPED | OUTPATIENT
Start: 2020-07-01 | End: 2021-04-19 | Stop reason: SDUPTHER

## 2020-07-01 NOTE — TELEPHONE ENCOUNTER
Pt called and left vm to call in a refil of his flomax.  I sent in a script to [facility]

## 2020-09-28 ENCOUNTER — OFFICE VISIT (OUTPATIENT)
Dept: GASTROENTEROLOGY | Facility: CLINIC | Age: 80
End: 2020-09-28

## 2020-09-28 VITALS
TEMPERATURE: 97.8 F | SYSTOLIC BLOOD PRESSURE: 122 MMHG | OXYGEN SATURATION: 97 % | BODY MASS INDEX: 23.3 KG/M2 | HEIGHT: 72 IN | DIASTOLIC BLOOD PRESSURE: 70 MMHG | HEART RATE: 75 BPM | WEIGHT: 172 LBS

## 2020-09-28 DIAGNOSIS — Z12.11 ENCOUNTER FOR SCREENING FOR MALIGNANT NEOPLASM OF COLON: Primary | ICD-10-CM

## 2020-09-28 DIAGNOSIS — D68.9 COAGULOPATHY (HCC): ICD-10-CM

## 2020-09-28 DIAGNOSIS — K21.9 GASTROESOPHAGEAL REFLUX DISEASE, ESOPHAGITIS PRESENCE NOT SPECIFIED: ICD-10-CM

## 2020-09-28 PROCEDURE — 99213 OFFICE O/P EST LOW 20 MIN: CPT | Performed by: INTERNAL MEDICINE

## 2020-09-28 RX ORDER — OMEPRAZOLE 20 MG/1
20 CAPSULE, DELAYED RELEASE ORAL DAILY
Qty: 90 CAPSULE | Refills: 3 | Status: SHIPPED | OUTPATIENT
Start: 2020-09-28 | End: 2021-08-02 | Stop reason: SDUPTHER

## 2020-09-28 NOTE — PROGRESS NOTES
[facility]    Chief Complaint   Patient presents with   • Heartburn       Subjective     HPI    Fabian Velez Sr. is a 80 y.o. male who presents with a chief complaint of heartburn.    He comes in for an annual checkup.  He states his heartburn is under control.  He takes omeprazole 20 mg that he takes in the evening.  He is not really having breakthrough symptoms.  We previously talked about him trying to wean off of it but he does not remember ever trying.  He states he always takes it.  Denies dysphasia.  Denies any dyspepsia.  He states everything is going well for him.      He does have atrial fibrillation which is a new medical problem for him.  He states he is on Eliquis now.  That started last winter.      Past Medical History:   Diagnosis Date   • Arthritis    • Atrial fibrillation (CMS/HCC)    • Diverticulosis    • GERD (gastroesophageal reflux disease)    • Hyperlipidemia    • Hypertension    • Plantar fasciitis    • Shingles    • Urticaria        Past Surgical History:   Procedure Laterality Date   • COLONOSCOPY  06/02/2016    5yr colon recall based on prep adequate to identify polyps greater than 6 mm   • ENDOSCOPY  06/14/2011   • INGUINAL HERNIA REPAIR     • REPLACEMENT TOTAL KNEE     • SHOULDER SURGERY Bilateral          Current Outpatient Medications:   •  apixaban (ELIQUIS) 5 MG tablet tablet, Take 5 mg by mouth 2 (Two) Times a Day., Disp: , Rfl:   •  Cholecalciferol (VITAMIN D3) 125 MCG (5000 UT) capsule capsule, Take 5,000 Units by mouth Daily., Disp: , Rfl:   •  dilTIAZem CD (CARDIZEM CD) 240 MG 24 hr capsule, Take 1 capsule by mouth Daily., Disp: 30 capsule, Rfl: 11  •  fenofibrate 160 MG tablet, Take 160 mg by mouth Daily., Disp: , Rfl:   •  metoprolol tartrate (LOPRESSOR) 25 MG tablet, Take 25 mg by mouth 2 (Two) Times a Day., Disp: , Rfl:   •  omeprazole (priLOSEC) 20 MG capsule, Take 1 capsule by mouth Daily., Disp: 90 capsule, Rfl: 3  •  simvastatin (ZOCOR) 10 MG  tablet, Take 10 mg by mouth Every Night., Disp: , Rfl:   •  tamsulosin (FLOMAX) 0.4 MG capsule 24 hr capsule, Take 1 capsule by mouth Daily., Disp: 90 capsule, Rfl: 2  •  traMADol (ULTRAM) 50 MG tablet, Take 1 tablet by mouth 3 (Three) Times a Day., Disp: , Rfl:     Allergies   Allergen Reactions   • Contrast Dye Other (See Comments)     flush       Social History     Socioeconomic History   • Marital status:      Spouse name: Not on file   • Number of children: Not on file   • Years of education: Not on file   • Highest education level: Not on file   Tobacco Use   • Smoking status: Former Smoker     Quit date: 2017     Years since quitting: 3.7   • Smokeless tobacco: Current User     Types: Chew   Substance and Sexual Activity   • Alcohol use: Yes     Comment: occ   • Drug use: No   • Sexual activity: Defer       Family History   Problem Relation Age of Onset   • Crohn's disease Son    • Heart disease Father    • Cancer Mother    • Stroke Brother    • Colon cancer Neg Hx    • Colon polyps Neg Hx        Review of Systems  General no fever chills or sweats weight stable  Gastrointestinal: Not present-abdominal pain, constipation, diarrhea, dysphagia, hematemesis, melena, odynophagia, nausea, vomiting, pyrosis, regurgitation, hematochezia,    Objective     Vitals:    09/28/20 1519   BP: 122/70   Pulse: 75   Temp: 97.8 °F (36.6 °C)   SpO2: 97%       Physical Exam  Vitals signs reviewed.   Constitutional:       Appearance: He is well-developed and normal weight. He is not ill-appearing.   Cardiovascular:      Rate and Rhythm: Normal rate.   Pulmonary:      Effort: Pulmonary effort is normal.   Abdominal:      General: Abdomen is flat. Bowel sounds are normal. There is no distension.      Palpations: There is no mass.      Tenderness: There is no abdominal tenderness. There is no guarding.   Neurological:      Mental Status: He is alert.   Psychiatric:         Thought Content: Thought content normal.          Judgment: Judgment normal.               Assessment/Plan   Problem List Items Addressed This Visit        Digestive    Gastroesophageal reflux disease    Relevant Medications    omeprazole (priLOSEC) 20 MG capsule       Hematopoietic and Hemostatic    Coagulopathy (CMS/HCC)    Overview     Secondary to Eliquis            Other    Encounter for screening for malignant neoplasm of colon - Primary            Regarding his reflux disease he is asymptomatic.  I refilled his prescription for omeprazole.  I did reinforce reflux precautions as well.  I did suggest he try to wean off the omeprazole.  I suggested he try missing a dose here and there.  If he remains asymptomatic then try to wean off it further.  He could try going to every other day.  He states he will give this a try.  If he starts having significant reflux off the medication will get back on it.  I do think it is reasonable to try to stop the medicine and see how much or how little he needs and he is in agreement to give it a try.    I did mention to him that in June of next year he will be due for a colon screening exam.  He will be 81 years old at that time.  So is reasonable not to pursue a colon screening exam.  I stated that there are increased risk at age 81 and that he would have to stop his blood thinner at the time.  Being off the blood thinner can increase risk for cardiovascular event.  He expressed good understanding.  I told him we do not have to decide today whether he wants to pursue a screening colon exam in June.  He last had a colonoscopy in June 2016 but the prep was not ideal so we have talked about maybe doing another one 5 years later.  I suggested he come back and see us next June in the office, we can reevaluate and rediscuss then whether to pursue investigation.  He is in agreement with this approach.    Continue ongoing management by primary care provider and other specialists.     Patient's Body mass index is 23.33 kg/m². BMI is  within normal parameters. No follow-up required..        EMR Dragon/transcription disclaimer:  Much of this encounter note is electronic transcription/translation of spoken language to printed text.  The electronic translation of spoken language may be erroneous, or at times, nonsensical words or phrases may be inadvertently transcribed.  Although I have reviewed the note for such errors, some may still exist.    Antonio Salgado MD  16:42 CDT  09/28/20

## 2020-11-05 ENCOUNTER — OFFICE VISIT (OUTPATIENT)
Dept: CARDIOLOGY | Facility: CLINIC | Age: 80
End: 2020-11-05

## 2020-11-05 VITALS
HEIGHT: 72 IN | BODY MASS INDEX: 23.43 KG/M2 | SYSTOLIC BLOOD PRESSURE: 112 MMHG | DIASTOLIC BLOOD PRESSURE: 75 MMHG | WEIGHT: 173 LBS | HEART RATE: 78 BPM | OXYGEN SATURATION: 98 %

## 2020-11-05 DIAGNOSIS — G89.29 CHRONIC LOW BACK PAIN, UNSPECIFIED BACK PAIN LATERALITY, UNSPECIFIED WHETHER SCIATICA PRESENT: ICD-10-CM

## 2020-11-05 DIAGNOSIS — I10 ESSENTIAL HYPERTENSION: ICD-10-CM

## 2020-11-05 DIAGNOSIS — I48.0 PAROXYSMAL ATRIAL FIBRILLATION (HCC): Primary | ICD-10-CM

## 2020-11-05 DIAGNOSIS — Z79.01 CHRONIC ANTICOAGULATION: ICD-10-CM

## 2020-11-05 DIAGNOSIS — M54.50 CHRONIC LOW BACK PAIN, UNSPECIFIED BACK PAIN LATERALITY, UNSPECIFIED WHETHER SCIATICA PRESENT: ICD-10-CM

## 2020-11-05 PROCEDURE — 99214 OFFICE O/P EST MOD 30 MIN: CPT | Performed by: INTERNAL MEDICINE

## 2020-11-05 PROCEDURE — 93000 ELECTROCARDIOGRAM COMPLETE: CPT | Performed by: INTERNAL MEDICINE

## 2020-11-05 RX ORDER — DILTIAZEM HYDROCHLORIDE 240 MG/1
240 CAPSULE, COATED, EXTENDED RELEASE ORAL DAILY
Qty: 90 CAPSULE | Refills: 3 | Status: SHIPPED | OUTPATIENT
Start: 2020-11-05 | End: 2021-04-09 | Stop reason: SDUPTHER

## 2020-11-05 RX ORDER — BACLOFEN 10 MG/1
10 TABLET ORAL DAILY
COMMUNITY

## 2020-11-05 NOTE — PROGRESS NOTES
"     Subjective:     Encounter Date:11/05/2020      Patient ID: Fabian Velez Sr. is a 80 y.o. male.    Chief Complaint: routine f/u  History of Present Illness  80-year-old male whom I initially met in May 2020 for a new/recent diagnosis of atrial fibrillation.  By way of review, he was in the [facility] with a family member when he far started noticing a \"funny feeling\" that he characterizes lightheadedness.  He was noted to be in atrial fibrillation with rapid ventricular response and after a brief overnight admission there, was discharged home on Eliquis and diltiazem.  He also has significantly limiting back pain that he was needing a series of back injections, with advice regarding anticoagulation around these.  We did have him wear a 14-day Zio patch to see if he was having any other occult atrial fibrillation; see below for results.  In short, he did not have any atrial fibrillation or other arrhythmias during that monitoring period.  I recommended that he take aspirin, if he can receive back injections while doing so, whenever he needed to be off Eliquis.  Since then, he has had an operation with Dr. Zendejas.  He returns today for routine follow-up.    His only complaint now is morning palpitations without associated symptoms.  Overall, doing well.    He still has back pain and couldn't get back injections due to inability to stop eliquis for a week at a time.    The following portions of the patient's history were reviewed and updated as appropriate: allergies, current medications, past family history, past medical history, past social history, past surgical history and problem list.    Review of Systems   Constitution: Negative for malaise/fatigue.   Cardiovascular: Positive for palpitations. Negative for chest pain, claudication, dyspnea on exertion, leg swelling, near-syncope, orthopnea, paroxysmal nocturnal dyspnea and syncope.   Respiratory: Negative for shortness of breath.    Hematologic/Lymphatic: " Does not bruise/bleed easily.   Musculoskeletal: Positive for back pain.           Current Outpatient Medications:   •  apixaban (ELIQUIS) 5 MG tablet tablet, Take 5 mg by mouth 2 (Two) Times a Day., Disp: , Rfl:   •  baclofen (LIORESAL) 10 MG tablet, Take 10 mg by mouth Daily., Disp: , Rfl:   •  Cholecalciferol (VITAMIN D3) 125 MCG (5000 UT) capsule capsule, Take 5,000 Units by mouth Daily., Disp: , Rfl:   •  dilTIAZem CD (CARDIZEM CD) 240 MG 24 hr capsule, Take 1 capsule by mouth Daily., Disp: 90 capsule, Rfl: 3  •  fenofibrate 160 MG tablet, Take 160 mg by mouth Daily., Disp: , Rfl:   •  metoprolol tartrate (LOPRESSOR) 25 MG tablet, Take 25 mg by mouth 2 (Two) Times a Day., Disp: , Rfl:   •  omeprazole (priLOSEC) 20 MG capsule, Take 1 capsule by mouth Daily., Disp: 90 capsule, Rfl: 3  •  simvastatin (ZOCOR) 10 MG tablet, Take 10 mg by mouth Every Night., Disp: , Rfl:   •  tamsulosin (FLOMAX) 0.4 MG capsule 24 hr capsule, Take 1 capsule by mouth Daily., Disp: 90 capsule, Rfl: 2  •  traMADol (ULTRAM) 50 MG tablet, Take 1 tablet by mouth 3 (Three) Times a Day., Disp: , Rfl:        Objective:      Vitals:    11/05/20 0956   BP: 112/75   Pulse: 78   SpO2: 98%     Vitals signs and nursing note reviewed.   Constitutional:       General: Not in acute distress.     Appearance: Not in distress.   Neck:      Vascular: No JVD.   Pulmonary:      Effort: Pulmonary effort is normal.      Breath sounds: Normal breath sounds.   Cardiovascular:      Normal rate. Regular rhythm.   Pulses:     Intact distal pulses.   Abdominal:      Palpations: Abdomen is soft.      Tenderness: There is no abdominal tenderness.   Skin:     General: Skin is warm and dry.   Neurological:      Mental Status: Alert and oriented to person, place, and time.         Lab Review:         ECG 12 Lead    Date/Time: 11/5/2020 9:05 PM  Performed by: Jeremie Lala MD  Authorized by: Jeremie Lala MD   Comparison: compared with previous ECG from  5/4/2020  Similar to previous ECG  Rhythm: sinus rhythm  Rate: normal  QRS axis: normal    Clinical impression: normal ECG                     Assessment/Plan:     Problem List Items Addressed This Visit (all established, stable)        Cardiovascular and Mediastinum    Atrial fibrillation (CMS/HCC) - Primary    Overview     Diagnosed Jan '20 - was symptomatic at time of diagnosis with AF/RVR (rate 150s) - was light-headed    CHADS-VASc Risk Assessment            3       Total Score        1 Hypertension    2 Age >/= 75        Criteria that do not apply:    CHF    DM    PRIOR STROKE/TIA/THROMBO    Vascular Disease    Age 65-74    Sex: Female                 Relevant Medications    dilTIAZem CD (CARDIZEM CD) 240 MG 24 hr capsule    Hypertension    Relevant Medications    dilTIAZem CD (CARDIZEM CD) 240 MG 24 hr capsule       Nervous and Auditory    Low back pain       Other    Chronic anticoagulation        Recommendations/plans:    Ok for injections if they can be administered with patient taking aspirin 81mg daily (patient is going to ask this again of his orthopedic providers); o/w continue eliquis for cva prophylaxis    Can hold eliquis for knee surgery; will be low risk for MACCE with upcoming knee operation    Continue lopressor 25mg po bid and cardizem 240mg daily for maintenance of rate control should AF recur, with anticoagulation recommendations as per above    Continue statin, with goal LDL as per general PCP guidelines    F/u 6 months    Jeremie Lala MD  11/05/2020  10:36 CST

## 2020-11-12 ENCOUNTER — TRANSCRIBE ORDERS (OUTPATIENT)
Dept: ADMINISTRATIVE | Facility: HOSPITAL | Age: 80
End: 2020-11-12

## 2020-11-12 DIAGNOSIS — R07.9 CHEST PAIN, UNSPECIFIED TYPE: Primary | ICD-10-CM

## 2020-11-18 ENCOUNTER — HOSPITAL ENCOUNTER (OUTPATIENT)
Dept: NUCLEAR MEDICINE | Age: 80
Discharge: HOME | End: 2020-11-20
Payer: MEDICARE

## 2020-11-18 PROCEDURE — 93017 CV STRESS TEST TRACING ONLY: CPT

## 2020-11-18 PROCEDURE — 6360000002 HC RX W HCPCS: Performed by: NURSE PRACTITIONER

## 2020-11-18 PROCEDURE — A9500 TC99M SESTAMIBI: HCPCS | Performed by: NURSE PRACTITIONER

## 2020-11-18 PROCEDURE — 3430000000 HC RX DIAGNOSTIC RADIOPHARMACEUTICAL: Performed by: NURSE PRACTITIONER

## 2020-11-18 RX ADMIN — TETRAKIS(2-METHOXYISOBUTYLISOCYANIDE)COPPER(I) TETRAFLUOROBORATE 10 MILLICURIE: 1 INJECTION, POWDER, LYOPHILIZED, FOR SOLUTION INTRAVENOUS at 11:22

## 2020-11-18 RX ADMIN — REGADENOSON 0.4 MG: 0.08 INJECTION, SOLUTION INTRAVENOUS at 11:12

## 2020-11-18 RX ADMIN — TETRAKIS(2-METHOXYISOBUTYLISOCYANIDE)COPPER(I) TETRAFLUOROBORATE 30 MILLICURIE: 1 INJECTION, POWDER, LYOPHILIZED, FOR SOLUTION INTRAVENOUS at 11:22

## 2020-11-19 LAB
LV EF: 65 %
LVEF MODALITY: NORMAL

## 2020-11-24 ENCOUNTER — APPOINTMENT (OUTPATIENT)
Dept: CARDIOLOGY | Facility: HOSPITAL | Age: 80
End: 2020-11-24

## 2020-12-13 ENCOUNTER — RESULTS ENCOUNTER (OUTPATIENT)
Dept: UROLOGY | Facility: CLINIC | Age: 80
End: 2020-12-13

## 2020-12-13 DIAGNOSIS — R97.20 ELEVATED PROSTATE SPECIFIC ANTIGEN (PSA): ICD-10-CM

## 2021-02-26 ENCOUNTER — OFFICE VISIT (OUTPATIENT)
Dept: CARDIOLOGY | Facility: CLINIC | Age: 81
End: 2021-02-26

## 2021-02-26 VITALS
DIASTOLIC BLOOD PRESSURE: 76 MMHG | HEIGHT: 72 IN | WEIGHT: 161 LBS | SYSTOLIC BLOOD PRESSURE: 121 MMHG | BODY MASS INDEX: 21.81 KG/M2 | OXYGEN SATURATION: 99 % | HEART RATE: 84 BPM

## 2021-02-26 DIAGNOSIS — M54.50 CHRONIC LOW BACK PAIN, UNSPECIFIED BACK PAIN LATERALITY, UNSPECIFIED WHETHER SCIATICA PRESENT: ICD-10-CM

## 2021-02-26 DIAGNOSIS — I10 ESSENTIAL HYPERTENSION: ICD-10-CM

## 2021-02-26 DIAGNOSIS — I48.0 PAF (PAROXYSMAL ATRIAL FIBRILLATION) (HCC): Primary | ICD-10-CM

## 2021-02-26 DIAGNOSIS — I48.0 PAROXYSMAL ATRIAL FIBRILLATION (HCC): ICD-10-CM

## 2021-02-26 DIAGNOSIS — G89.29 CHRONIC LOW BACK PAIN, UNSPECIFIED BACK PAIN LATERALITY, UNSPECIFIED WHETHER SCIATICA PRESENT: ICD-10-CM

## 2021-02-26 PROCEDURE — 93000 ELECTROCARDIOGRAM COMPLETE: CPT | Performed by: NURSE PRACTITIONER

## 2021-02-26 PROCEDURE — 99214 OFFICE O/P EST MOD 30 MIN: CPT | Performed by: NURSE PRACTITIONER

## 2021-02-26 RX ORDER — ACETAMINOPHEN 500 MG
1000 TABLET ORAL EVERY 6 HOURS PRN
COMMUNITY

## 2021-03-01 ENCOUNTER — TELEPHONE (OUTPATIENT)
Dept: CARDIOLOGY | Facility: CLINIC | Age: 81
End: 2021-03-01

## 2021-03-10 ENCOUNTER — OFFICE VISIT (OUTPATIENT)
Dept: ENT CLINIC | Age: 81
End: 2021-03-10
Payer: MEDICARE

## 2021-03-10 VITALS
WEIGHT: 159 LBS | DIASTOLIC BLOOD PRESSURE: 78 MMHG | SYSTOLIC BLOOD PRESSURE: 124 MMHG | HEIGHT: 72 IN | BODY MASS INDEX: 21.54 KG/M2

## 2021-03-10 DIAGNOSIS — H61.23 BILATERAL IMPACTED CERUMEN: Primary | ICD-10-CM

## 2021-03-10 PROCEDURE — G8484 FLU IMMUNIZE NO ADMIN: HCPCS | Performed by: PHYSICIAN ASSISTANT

## 2021-03-10 PROCEDURE — 69210 REMOVE IMPACTED EAR WAX UNI: CPT | Performed by: PHYSICIAN ASSISTANT

## 2021-03-10 PROCEDURE — 99202 OFFICE O/P NEW SF 15 MIN: CPT | Performed by: PHYSICIAN ASSISTANT

## 2021-03-10 PROCEDURE — 4004F PT TOBACCO SCREEN RCVD TLK: CPT | Performed by: PHYSICIAN ASSISTANT

## 2021-03-10 PROCEDURE — G8427 DOCREV CUR MEDS BY ELIG CLIN: HCPCS | Performed by: PHYSICIAN ASSISTANT

## 2021-03-10 PROCEDURE — G8420 CALC BMI NORM PARAMETERS: HCPCS | Performed by: PHYSICIAN ASSISTANT

## 2021-03-10 PROCEDURE — 4040F PNEUMOC VAC/ADMIN/RCVD: CPT | Performed by: PHYSICIAN ASSISTANT

## 2021-03-10 PROCEDURE — 1123F ACP DISCUSS/DSCN MKR DOCD: CPT | Performed by: PHYSICIAN ASSISTANT

## 2021-03-10 RX ORDER — DILTIAZEM HYDROCHLORIDE 240 MG/1
CAPSULE, COATED, EXTENDED RELEASE ORAL
COMMUNITY
Start: 2021-02-01

## 2021-03-10 RX ORDER — METOPROLOL TARTRATE 50 MG/1
TABLET, FILM COATED ORAL
COMMUNITY
Start: 2020-12-10

## 2021-03-10 RX ORDER — SIMVASTATIN 10 MG
TABLET ORAL
COMMUNITY

## 2021-03-10 RX ORDER — TRAMADOL HYDROCHLORIDE 50 MG/1
1 TABLET ORAL 3 TIMES DAILY
COMMUNITY

## 2021-03-10 RX ORDER — TAMSULOSIN HYDROCHLORIDE 0.4 MG/1
0.4 CAPSULE ORAL DAILY
COMMUNITY
Start: 2020-07-01

## 2021-03-10 RX ORDER — BACLOFEN 10 MG/1
TABLET ORAL
COMMUNITY

## 2021-03-10 ASSESSMENT — ENCOUNTER SYMPTOMS
FACIAL SWELLING: 0
PHOTOPHOBIA: 0
RHINORRHEA: 0
EYE PAIN: 0
TROUBLE SWALLOWING: 0
VOICE CHANGE: 0
SINUS PRESSURE: 0
SINUS PAIN: 0
SORE THROAT: 0

## 2021-03-10 NOTE — PROGRESS NOTES
[facility] OTOLARYNGOLOGY/ENT  Mr. Monserrat Melissa is a pleasant 36-year-old  male that was referred by Dr. Lauren Linn due to problems with diminished hearing which he believes is related to cerumen impaction. He admits to some drainage from the external canals and also occasional episodes of tinnitus. He reports that his loss of hearing will come and go depending on when he moves his ear canal externally. Allergies: Iodides, Contrast  [barium-containing compounds], and Red dye      Current Outpatient Medications   Medication Sig Dispense Refill    apixaban (ELIQUIS) 5 MG TABS tablet Eliquis 5 mg tablet      baclofen (LIORESAL) 10 MG tablet baclofen 10 mg tablet      vitamin D (CHOLECALCIFEROL) 125 MCG (5000 UT) CAPS capsule cholecalciferol (vitamin D3) 125 mcg (5,000 unit) capsule   one tab daily      dilTIAZem (CARDIZEM CD) 240 MG extended release capsule       metoprolol tartrate (LOPRESSOR) 50 MG tablet       simvastatin (ZOCOR) 10 MG tablet simvastatin 10 mg tablet      tamsulosin (FLOMAX) 0.4 MG capsule Take 0.4 mg by mouth daily      traMADol (ULTRAM) 50 MG tablet Take 1 tablet by mouth 3 times daily.  Naproxen Sodium (ALEVE PO) Take by mouth daily      fenofibrate 160 MG tablet Take 160 mg by mouth daily      diclofenac (VOLTAREN) 75 MG EC tablet Take 75 mg by mouth daily      MAGNESIUM PO Take by mouth      omeprazole (PRILOSEC) 20 MG capsule Take 20 mg by mouth daily.  bisoprolol-hydrochlorothiazide (ZIAC) 5-6.25 MG per tablet Take 1 tablet by mouth daily. No current facility-administered medications for this visit.         Past Surgical History:   Procedure Laterality Date    CHG FLUOROSCOPIC GUIDANCE NEEDLE PLACEMENT ADD ON Left 3/29/2018    HIP INJECTION performed by Alice Rivera DO at [address] ADD ON Left 7/12/2018    LEFT HIP STERIOD INJECTION performed by Alice Rivera DO at [address] [place] [address]  SHOULDER ARTHROSCOPY      bilateral    TOTAL KNEE ARTHROPLASTY      left       Past Medical History:   Diagnosis Date    DJD (degenerative joint disease) of hip     GERD (gastroesophageal reflux disease)     Hyperlipemia     Hypertension        Family History   Problem Relation Age of Onset    COPD Mother     Heart Disease Father     Heart Attack Father        Social History     Tobacco Use    Smoking status: Never Smoker    Smokeless tobacco: Current User     Types: Snuff   Substance Use Topics    Alcohol use: Yes     Comment: beer- mod           REVIEW OF SYSTEMS:  all other systems reviewed and are negative  Review of Systems   Constitutional: Negative for chills and fever. HENT: Positive for ear discharge, ear pain and hearing loss. Negative for congestion, dental problem, facial swelling, postnasal drip, rhinorrhea, sinus pressure, sinus pain, sore throat, tinnitus, trouble swallowing and voice change. Eyes: Negative for photophobia and pain. Neurological: Negative for dizziness, seizures and headaches. Comments:     PHYSICAL EXAM:    /78   Ht 6' (1.829 m)   Wt 159 lb (72.1 kg)   BMI 21.56 kg/m²   Body mass index is 21.56 kg/m².     General Appearance: well developed  and well nourished  Head/ Face: normocephalic and atraumatic  Vocal Quality: good/ normal  Ears: Right Ear: External: external ears normal Otoscopy Ear Canal: cerumen impaction Otoscopy TM: TM's normal and TM's mobile Left Ear: External: external ears normal Otoscopy Ear Canal: cerumen impaction Otoscopy TM: TM's normal and TM's mobile  Hearing: grossly intact  Nose: nares normal and septum midline  Neck: supple, adenopathy none palpable and mass No  Thyroid: normal and nodules No    Assessment & Plan:    Problem List Items Addressed This Visit     Bilateral impacted cerumen - Primary     Bilateral cerumen impaction-will clean today with microscopic guidance  Plan: The patient was advised that he will need audiology studies due to his diminished hearing. He is already established with Cliff Lim in [place] therefore he is going to see him in the next month or 2. He is to follow-up with me in 6 months for cerumen check and reminded to call if he has any questions or problems. Relevant Orders    81214 - DE REMOVE IMPACTED EAR WAX (Completed)          Orders Placed This Encounter   Procedures    24175 - DE REMOVE IMPACTED EAR WAX     With microscopic guidance, the cerumen impaction was removed successfully with alligator graspers bilaterally. Large amount of cerumen was removed from both ears with the TMs noted to be normal with no evidence of infection or perforation. The patient tolerated the procedure nicely with no complications. No orders of the defined types were placed in this encounter. Electronically signed by Víctor Lerner PA-C on 3/10/21 at 11:11 AM CST            Please note that this chart was generated using dragon dictation software. Although every effort was made to ensure the accuracy of this automated transcription, some errors in transcription may have occurred.

## 2021-03-26 ENCOUNTER — TELEPHONE (OUTPATIENT)
Dept: CARDIOLOGY | Facility: CLINIC | Age: 81
End: 2021-03-26

## 2021-03-26 NOTE — TELEPHONE ENCOUNTER
They aren't back yet. Check with Uvaldo and make sure it's been sent to Dr. Lala, and there isn't a problem there.  Once he reads it, we will contact him with results. Thanks!

## 2021-03-29 RX ORDER — METOPROLOL TARTRATE 50 MG/1
50 TABLET, FILM COATED ORAL 2 TIMES DAILY
Qty: 60 TABLET | Refills: 11 | Status: SHIPPED | OUTPATIENT
Start: 2021-03-29 | End: 2021-10-22

## 2021-04-09 ENCOUNTER — OFFICE VISIT (OUTPATIENT)
Dept: CARDIOLOGY | Facility: CLINIC | Age: 81
End: 2021-04-09

## 2021-04-09 VITALS
SYSTOLIC BLOOD PRESSURE: 126 MMHG | OXYGEN SATURATION: 98 % | HEART RATE: 62 BPM | HEIGHT: 72 IN | DIASTOLIC BLOOD PRESSURE: 70 MMHG | BODY MASS INDEX: 20.86 KG/M2 | WEIGHT: 154 LBS

## 2021-04-09 DIAGNOSIS — M54.50 CHRONIC LOW BACK PAIN, UNSPECIFIED BACK PAIN LATERALITY, UNSPECIFIED WHETHER SCIATICA PRESENT: ICD-10-CM

## 2021-04-09 DIAGNOSIS — I10 ESSENTIAL HYPERTENSION: ICD-10-CM

## 2021-04-09 DIAGNOSIS — G89.29 CHRONIC LOW BACK PAIN, UNSPECIFIED BACK PAIN LATERALITY, UNSPECIFIED WHETHER SCIATICA PRESENT: ICD-10-CM

## 2021-04-09 DIAGNOSIS — I48.0 PAROXYSMAL ATRIAL FIBRILLATION (HCC): Primary | ICD-10-CM

## 2021-04-09 PROCEDURE — 93000 ELECTROCARDIOGRAM COMPLETE: CPT | Performed by: NURSE PRACTITIONER

## 2021-04-09 PROCEDURE — 99214 OFFICE O/P EST MOD 30 MIN: CPT | Performed by: NURSE PRACTITIONER

## 2021-04-09 RX ORDER — DILTIAZEM HYDROCHLORIDE 240 MG/1
240 CAPSULE, COATED, EXTENDED RELEASE ORAL DAILY
Qty: 90 CAPSULE | Refills: 3 | Status: SHIPPED | OUTPATIENT
Start: 2021-04-09 | End: 2022-01-10 | Stop reason: SDUPTHER

## 2021-04-09 NOTE — PROGRESS NOTES
Subjective:     Encounter Date:04/09/2021      Patient ID: Fabian Velez Sr. is a 80 y.o. male.    Chief Complaint: Follow-up PAF    The patient initially established care with Dr. Lala in May 2020 new/recent diagnosis of paroxysmal atrial fibrillation.  He was in [facility] with a family member when he started noticing a funny feeling that he described as lightheadedness.  He was noted to be in atrial fibrillation, with rapid ventricular response and after a brief overnight admission there he was discharged home on Eliquis and diltiazem.  He also has limiting back pain that he was needing a series of back injections for, with advice regarding anticoagulation around those.  He wore a 14-day Zio patch in May 2020 to see if he was having any other occult atrial fibrillation, and he was not.  Dr. Lala recommended that he take aspirin, if he received back injections if he needed to be off of the Eliquis.    When he saw Dr. Lala in November 2020 he was doing well overall.  He was having some brief palpitations in the morning without associated symptoms.  At that visit, it was again recommended that he take aspirin in place of Eliquis when the Eliquis needed to be held for back injections.  He was given the okay to hold Eliquis for knee surgery.  Lopressor, diltiazem and Eliquis were continued.    I saw the patient on 2/26/2021 and he reported 1 specific episode of significant palpitations with associated dizziness, lightheadedness, chest discomfort and shortness of breath.  He reported he was at [facility] the day prior to the visit visiting his wife who had had hip surgery, when he suddenly developed the symptoms.  At that time, he was not necessarily exerting or doing anything out of the norm.  He reports symptoms felt similar to his previous atrial fibrillation.  Episode lasted approximately 4 to 5 hours and then symptoms resolved without any specific treatment.  He  reported over the past year he would have occasional palpitations that would last 10 to 15 minutes, approximately twice per month without any other associated symptoms.  He reported those milder episodes for not increasing in severity, duration or frequency.  He did admit increased stress in his life at that time.  At that visit, 14-day Zio patch was placed.  See results below.    I did contact the patient on 3/2021 with his monitor results and increased his Lopressor from 25 mg to 50 mg twice daily to treat his palpitations, some of which correlated with PACs or short runs of SVT.  He did not have atrial fibrillation while wearing the monitor.  During that phone conversation, he denied any chest pain outside of what he experiences with palpitations, although he did report an episode of chest pain while in normal sinus rhythm while wearing the monitor according to the symptom diary he turned in.    Today the patient reports he has been feeling better overall since increasing metoprolol.  He reports he did have some sustained palpitations without any other significant symptoms the day after he took his monitor off.  He denies any significant palpitations since that time, but will have some milder occasional pounding/fluttering that will last a few minutes a couple times per week.  He denies any chest pain.  He reports shortness of breath with exertion, which he relates to deconditioning after prior surgeries and back pain.  He denies edema, orthopnea, PND, syncope or presyncope.  He reports compliance with medications and good blood pressure control.      The following portions of the patient's history were reviewed and updated as appropriate: allergies, current medications, past family history, past medical history, past social history, past surgical history and problem list.    Review of Systems   Constitutional: Positive for malaise/fatigue.   Cardiovascular: Positive for dyspnea on exertion and palpitations  "(improved ). Negative for chest pain, claudication, leg swelling, near-syncope, orthopnea, paroxysmal nocturnal dyspnea and syncope.   Respiratory: Negative for cough and shortness of breath.    Hematologic/Lymphatic: Does not bruise/bleed easily.   Musculoskeletal: Negative for falls.   Gastrointestinal: Negative for bloating.   Neurological: Positive for weakness. Negative for dizziness and light-headedness.   Psychiatric/Behavioral:        Increased stress        Allergies   Allergen Reactions   • Contrast Dye Other (See Comments)     flush       Current Outpatient Medications:   •  acetaminophen (TYLENOL) 500 MG tablet, Take 1,000 mg by mouth Every 6 (Six) Hours As Needed for Mild Pain ., Disp: , Rfl:   •  apixaban (ELIQUIS) 5 MG tablet tablet, Take 1 tablet by mouth Every 12 (Twelve) Hours., Disp: 180 tablet, Rfl: 3  •  baclofen (LIORESAL) 10 MG tablet, Take 10 mg by mouth Daily., Disp: , Rfl:   •  Cholecalciferol (VITAMIN D3) 125 MCG (5000 UT) capsule capsule, Take 5,000 Units by mouth Daily., Disp: , Rfl:   •  dilTIAZem CD (CARDIZEM CD) 240 MG 24 hr capsule, Take 1 capsule by mouth Daily., Disp: 90 capsule, Rfl: 3  •  fenofibrate 160 MG tablet, Take 160 mg by mouth Daily., Disp: , Rfl:   •  metoprolol tartrate (LOPRESSOR) 50 MG tablet, Take 1 tablet by mouth 2 (Two) Times a Day., Disp: 60 tablet, Rfl: 11  •  omeprazole (priLOSEC) 20 MG capsule, Take 1 capsule by mouth Daily., Disp: 90 capsule, Rfl: 3  •  simvastatin (ZOCOR) 10 MG tablet, Take 10 mg by mouth Every Night., Disp: , Rfl:   •  tamsulosin (FLOMAX) 0.4 MG capsule 24 hr capsule, Take 1 capsule by mouth Daily., Disp: 90 capsule, Rfl: 2  •  traMADol (ULTRAM) 50 MG tablet, Take 1 tablet by mouth 3 (Three) Times a Day., Disp: , Rfl:          Objective:    /70   Pulse 62   Ht 182.9 cm (72\")   Wt 69.9 kg (154 lb)   SpO2 98%   BMI 20.89 kg/m²        Vitals and nursing note reviewed.   Constitutional:       General: Not in acute distress.     " Appearance: Well-developed and not in distress. Not diaphoretic.   Neck:      Vascular: No JVD.   Pulmonary:      Effort: Pulmonary effort is normal. No respiratory distress.      Breath sounds: Normal breath sounds.   Cardiovascular:      Normal rate. Regular rhythm.      Murmurs: There is no murmur.   Edema:     Peripheral edema absent.   Abdominal:      Tenderness: There is no abdominal tenderness.   Skin:     General: Skin is warm and dry.   Neurological:      Mental Status: Alert and oriented to person, place, and time.         Lab Review:   No results found for: GLUCOSE, BUN, CREATININE, EGFRIFNONA, EGFRIFAFRI, BCR, K, CO2, CALCIUM, PROTENTOTREF, ALBUMIN, LABIL2, BILIRUBIN, AST, ALT     No results found for: TSH          ECG 12 Lead    Date/Time: 4/9/2021 10:50 AM  Performed by: Jenny Sun APRN  Authorized by: Jenny Sun APRN   Comparison: compared with previous ECG from 2/26/2021  Similar to previous ECG  Rhythm: sinus bradycardia  BPM: 57          echo report previously reviewed by Dr. Lala:  1/21/2020.  LVEF 68% with mild LVH.  Left and right atria both said to be normal in size.  Normal size and function of the RV.  No significant valvular pathology noted.    5/2020 14 day monitor -   · A relatively benign monitor study.  · Predominant rhythm is normal sinus rhythm.  · Numerous runs of short, nonsustained supraventricular tachycardia.  · Two patient reported episodes of symptoms, including lightheadedness, and palpitations, corresponded with frequent bursts of short, nonsustained supraventricular tachycardia.  · Occasional (3.4%) isolated PACs.    2/2021 14 day monitor :    · An abnormal monitor study.  · Predominant rhythm was normal sinus rhythm.  · Patient reported symptoms on 5 occasions. When palpitations were reported, it seemed to correlate with PACs and/or short runs of SVT. When chest pain was reported alone (i.e. not with palpitations), it seemed to only be at times of normal sinus  rhythm.  · No sustained arrhythmias, but there were numerous short episodes of SVT (longest was 18 seconds).  · No evidence of atrial fibrillation or flutter.  · Occasional (2.6%) PACs.      Assessment:      Problem List Items Addressed This Visit        Cardiac and Vasculature    Atrial fibrillation (CMS/HCC) - Primary    Overview     Diagnosed Jan '20 - was symptomatic at time of diagnosis with AF/RVR (rate 150s) - was light-headed    CHADS-VASc Risk Assessment            3       Total Score        1 Hypertension    2 Age >/= 75        Criteria that do not apply:    CHF    DM    PRIOR STROKE/TIA/THROMBO    Vascular Disease    Age 65-74    Sex: Female                 Relevant Medications    dilTIAZem CD (CARDIZEM CD) 240 MG 24 hr capsule    Hypertension    Relevant Medications    dilTIAZem CD (CARDIZEM CD) 240 MG 24 hr capsule       Musculoskeletal and Injuries    Low back pain          Plan:     1.  Paroxysmal atrial fibrillation: Established problem, stable.  No evidence of atrial fibrillation or flutter on recent 14-day monitor.  Palpitations correlated with PACs and short runs of SVT.  Lopressor dose was increased and palpitations subsequently improved.  He denies further chest pain.  As I did note previously, if he develops chest pain concerning for angina outside of what he experiences with his palpitations, would consider stress testing- he denies this today.  -Continue Eliquis 5 mg twice daily for anticoagulation (replace this with aspirin 81 mg daily when receiving back injections, then resume Eliquis and DC aspirin post procedure when safe from a bleeding standpoint)  -Continue Lopressor 50 mg twice daily and diltiazem 240 mg daily for rate control with recurrences of atrial fibrillation    2.  Essential hypertension: Established problem, stable.  Well-controlled on calcium channel blocker and beta-blocker.    Continue statin-management per PCP    Follow-up with Dr. Lala in 6 months, sooner symptoms or  concerns.

## 2021-04-14 NOTE — PROGRESS NOTES
Subjective    Mr. Velez is 80 y.o. male    Chief Complaint: Elevated PSA    History of Present Illness    80-year-old male established patient follow-up for enlarged prostate.  SContext LUTS currently well controlled on tamsulosin.  We decided to stop PSA screening given his advanced age as his last PSA was normal at 1.77 on 12/16/2019.  He denies hematuria, dysuria, or flank pain.  UA today is clear.       The following portions of the patient's history were reviewed and updated as appropriate: allergies, current medications, past family history, past medical history, past social history, past surgical history and problem list.    Review of Systems      Current Outpatient Medications:   •  acetaminophen (TYLENOL) 500 MG tablet, Take 1,000 mg by mouth Every 6 (Six) Hours As Needed for Mild Pain ., Disp: , Rfl:   •  apixaban (ELIQUIS) 5 MG tablet tablet, Take 1 tablet by mouth Every 12 (Twelve) Hours., Disp: 180 tablet, Rfl: 3  •  baclofen (LIORESAL) 10 MG tablet, Take 10 mg by mouth Daily., Disp: , Rfl:   •  Cholecalciferol (VITAMIN D3) 125 MCG (5000 UT) capsule capsule, Take 5,000 Units by mouth Daily., Disp: , Rfl:   •  dilTIAZem CD (CARDIZEM CD) 240 MG 24 hr capsule, Take 1 capsule by mouth Daily., Disp: 90 capsule, Rfl: 3  •  fenofibrate 160 MG tablet, Take 160 mg by mouth Daily., Disp: , Rfl:   •  metoprolol tartrate (LOPRESSOR) 50 MG tablet, Take 1 tablet by mouth 2 (Two) Times a Day., Disp: 60 tablet, Rfl: 11  •  omeprazole (priLOSEC) 20 MG capsule, Take 1 capsule by mouth Daily., Disp: 90 capsule, Rfl: 3  •  simvastatin (ZOCOR) 10 MG tablet, Take 10 mg by mouth Every Night., Disp: , Rfl:   •  tamsulosin (FLOMAX) 0.4 MG capsule 24 hr capsule, Take 1 capsule by mouth Daily., Disp: 90 capsule, Rfl: 3  •  traMADol (ULTRAM) 50 MG tablet, Take 1 tablet by mouth 3 (Three) Times a Day., Disp: , Rfl:     Past Medical History:   Diagnosis Date   • Arthritis    • Atrial fibrillation (CMS/HCC)    • Diverticulosis    •  "GERD (gastroesophageal reflux disease)    • Hyperlipidemia    • Hypertension    • Plantar fasciitis    • Shingles    • Urticaria        Past Surgical History:   Procedure Laterality Date   • COLONOSCOPY  2016    5yr colon recall based on prep adequate to identify polyps greater than 6 mm   • ENDOSCOPY  2011   • INGUINAL HERNIA REPAIR     • REPLACEMENT TOTAL KNEE     • SHOULDER SURGERY Bilateral        Social History     Socioeconomic History   • Marital status:      Spouse name: Not on file   • Number of children: Not on file   • Years of education: Not on file   • Highest education level: Not on file   Tobacco Use   • Smoking status: Former Smoker     Quit date:      Years since quittin.3   • Smokeless tobacco: Current User     Types: Chew   Vaping Use   • Vaping Use: Never used   Substance and Sexual Activity   • Alcohol use: Yes     Comment: occ   • Drug use: No   • Sexual activity: Defer       Family History   Problem Relation Age of Onset   • Crohn's disease Son    • Heart disease Father    • Cancer Mother    • Stroke Brother    • Colon cancer Neg Hx    • Colon polyps Neg Hx        Objective    Temp 96.7 °F (35.9 °C)   Ht 182.9 cm (72\")   Wt 71.8 kg (158 lb 3.2 oz)   BMI 21.46 kg/m²     Physical Exam  He is in no apparent distress.      Results for orders placed or performed in visit on 21   POC Urinalysis Dipstick, Multipro    Specimen: Urine   Result Value Ref Range    Color Yellow Yellow, Straw, Dark Yellow, Tamara    Clarity, UA Clear Clear    Glucose, UA Negative Negative, 1000 mg/dL (3+) mg/dL    Bilirubin Negative Negative    Ketones, UA Trace (A) Negative    Specific Gravity  1.025 1.005 - 1.030    Blood, UA Negative Negative    pH, Urine 6.0 5.0 - 8.0    Protein,  mg/dL (A) Negative mg/dL    Urobilinogen, UA 1 E.U./dL  (A) Normal    Nitrite, UA Negative Negative    Leukocytes Trace (A) Negative     Assessment and Plan    Diagnoses and all orders for this " visit:    1. Urine frequency (Primary)  -     POC Urinalysis Dipstick, Multipro    2. Poor urinary stream  -     tamsulosin (FLOMAX) 0.4 MG capsule 24 hr capsule; Take 1 capsule by mouth Daily.  Dispense: 90 capsule; Refill: 3      Stable LUTS on alpha-blocker-continue tamsulosin.  He will follow-up with our physician assistant in 1 year with no PSA.      This document has been signed by TIFFANIE Euceda MD on April 20, 2021 16:07 CDT

## 2021-04-19 ENCOUNTER — OFFICE VISIT (OUTPATIENT)
Dept: UROLOGY | Facility: CLINIC | Age: 81
End: 2021-04-19

## 2021-04-19 VITALS — TEMPERATURE: 96.7 F | BODY MASS INDEX: 21.43 KG/M2 | WEIGHT: 158.2 LBS | HEIGHT: 72 IN

## 2021-04-19 DIAGNOSIS — R35.0 URINE FREQUENCY: Primary | ICD-10-CM

## 2021-04-19 DIAGNOSIS — R39.12 POOR URINARY STREAM: ICD-10-CM

## 2021-04-19 LAB
BILIRUB BLD-MCNC: NEGATIVE MG/DL
CLARITY, POC: CLEAR
COLOR UR: YELLOW
GLUCOSE UR STRIP-MCNC: NEGATIVE MG/DL
KETONES UR QL: ABNORMAL
LEUKOCYTE EST, POC: ABNORMAL
NITRITE UR-MCNC: NEGATIVE MG/ML
PH UR: 6 [PH] (ref 5–8)
PROT UR STRIP-MCNC: ABNORMAL MG/DL
RBC # UR STRIP: NEGATIVE /UL
SP GR UR: 1.02 (ref 1–1.03)
UROBILINOGEN UR QL: ABNORMAL

## 2021-04-19 PROCEDURE — 99213 OFFICE O/P EST LOW 20 MIN: CPT | Performed by: UROLOGY

## 2021-04-19 PROCEDURE — 81003 URINALYSIS AUTO W/O SCOPE: CPT | Performed by: UROLOGY

## 2021-04-19 RX ORDER — TAMSULOSIN HYDROCHLORIDE 0.4 MG/1
1 CAPSULE ORAL DAILY
Qty: 90 CAPSULE | Refills: 3 | Status: SHIPPED | OUTPATIENT
Start: 2021-04-19

## 2021-06-17 ENCOUNTER — TELEPHONE (OUTPATIENT)
Dept: NEUROSURGERY | Age: 81
End: 2021-06-17

## 2021-06-24 ENCOUNTER — TELEPHONE (OUTPATIENT)
Dept: NEUROSURGERY | Age: 81
End: 2021-06-24

## 2021-06-24 NOTE — TELEPHONE ENCOUNTER
Called [facility] and spoke with Dane Whyte, she voiced that she would push the MRI image to [facility] and fax the report to our office today.

## 2021-06-25 ENCOUNTER — TELEPHONE (OUTPATIENT)
Dept: NEUROSURGERY | Age: 81
End: 2021-06-25

## 2021-06-28 ENCOUNTER — OFFICE VISIT (OUTPATIENT)
Dept: NEUROSURGERY | Age: 81
End: 2021-06-28
Payer: MEDICARE

## 2021-06-28 VITALS
HEIGHT: 72 IN | HEART RATE: 59 BPM | BODY MASS INDEX: 21.26 KG/M2 | WEIGHT: 157 LBS | DIASTOLIC BLOOD PRESSURE: 91 MMHG | SYSTOLIC BLOOD PRESSURE: 172 MMHG

## 2021-06-28 DIAGNOSIS — M48.061 SPINAL STENOSIS OF LUMBAR REGION WITHOUT NEUROGENIC CLAUDICATION: Primary | ICD-10-CM

## 2021-06-28 DIAGNOSIS — R20.8 DECREASED SENSATION: ICD-10-CM

## 2021-06-28 DIAGNOSIS — G89.29 CHRONIC MIDLINE LOW BACK PAIN WITH LEFT-SIDED SCIATICA: ICD-10-CM

## 2021-06-28 DIAGNOSIS — M54.42 CHRONIC MIDLINE LOW BACK PAIN WITH LEFT-SIDED SCIATICA: ICD-10-CM

## 2021-06-28 DIAGNOSIS — R20.2 LEFT LEG PARESTHESIAS: ICD-10-CM

## 2021-06-28 DIAGNOSIS — M51.36 DDD (DEGENERATIVE DISC DISEASE), LUMBAR: ICD-10-CM

## 2021-06-28 PROCEDURE — 4004F PT TOBACCO SCREEN RCVD TLK: CPT | Performed by: NURSE PRACTITIONER

## 2021-06-28 PROCEDURE — 4040F PNEUMOC VAC/ADMIN/RCVD: CPT | Performed by: NURSE PRACTITIONER

## 2021-06-28 PROCEDURE — 99204 OFFICE O/P NEW MOD 45 MIN: CPT | Performed by: NURSE PRACTITIONER

## 2021-06-28 PROCEDURE — G8420 CALC BMI NORM PARAMETERS: HCPCS | Performed by: NURSE PRACTITIONER

## 2021-06-28 PROCEDURE — 1123F ACP DISCUSS/DSCN MKR DOCD: CPT | Performed by: NURSE PRACTITIONER

## 2021-06-28 PROCEDURE — G8427 DOCREV CUR MEDS BY ELIG CLIN: HCPCS | Performed by: NURSE PRACTITIONER

## 2021-06-28 RX ORDER — WARFARIN SODIUM 2.5 MG/1
2.5 TABLET ORAL
COMMUNITY
End: 2021-06-28

## 2021-06-28 RX ORDER — OXYCODONE HCL 10 MG/1
10 TABLET, FILM COATED, EXTENDED RELEASE ORAL EVERY 12 HOURS
COMMUNITY
End: 2021-06-28

## 2021-06-28 ASSESSMENT — ENCOUNTER SYMPTOMS
BACK PAIN: 1
RESPIRATORY NEGATIVE: 1
EYES NEGATIVE: 1
GASTROINTESTINAL NEGATIVE: 1

## 2021-06-28 NOTE — PROGRESS NOTES
[facility]  Office Visit      Chief Complaint   Patient presents with    Referral - General    Back Pain    Leg Pain    Numbness       HISTORY OF PRESENT ILLNESS:    Janneth Gray is a 79 y.o. male who presents with low back and left leg paresthesias that has been present for about 7 months. The pain does not radiate, however, he does have paresthesias of the left lateral thigh, lateral leg. The patient complains of numbness of the left toes. Standing bent over slightly such as putting gas in his mower and driving will exacerbate the left leg paresthesias. Left leg will occasionally have a jerking movement. His pain is not changed when going from a seated to standing position. His pain is not changed with walking. His pain is not changed when lying flat. Overall, indicative that the patient does not have a mechanical nature to their pain. The patient has underwent a non-operative treatment course that has included:  NSAIDs (naproxen, diclofenac)  Opiates (tramadol)  Physical Therapy (currently in therapy)  Epidural Steroid Injections (Dr. Sandro Mora a few years ago)  Massage Therapy  Chiropractic Manipulation    Of note he does not use tobacco and does take blood thinning medications (Eliquis) a fib.                Past Medical History:   Diagnosis Date    DJD (degenerative joint disease) of hip     GERD (gastroesophageal reflux disease)     Hyperlipemia     Hypertension        Past Surgical History:   Procedure Laterality Date    CHG FLUOROSCOPIC GUIDANCE NEEDLE PLACEMENT ADD ON Left 3/29/2018    HIP INJECTION performed by Bao Gallego DO at [address] ADD ON Left 7/12/2018    LEFT HIP STERIOD INJECTION performed by Bao Gallego DO at [address] [facility] ARTHROSCOPY      bilateral    TOTAL KNEE ARTHROPLASTY      left       Current Outpatient Medications   Medication Sig Dispense Refill    apixaban (ELIQUIS) 5 MG TABS tablet Eliquis 5 mg tablet      baclofen (LIORESAL) 10 MG tablet baclofen 10 mg tablet      vitamin D (CHOLECALCIFEROL) 125 MCG (5000 UT) CAPS capsule cholecalciferol (vitamin D3) 125 mcg (5,000 unit) capsule   one tab daily      dilTIAZem (CARDIZEM CD) 240 MG extended release capsule       metoprolol tartrate (LOPRESSOR) 50 MG tablet       simvastatin (ZOCOR) 10 MG tablet simvastatin 10 mg tablet      tamsulosin (FLOMAX) 0.4 MG capsule Take 0.4 mg by mouth daily      traMADol (ULTRAM) 50 MG tablet Take 1 tablet by mouth 3 times daily.  fenofibrate 160 MG tablet Take 160 mg by mouth daily      omeprazole (PRILOSEC) 20 MG capsule Take 20 mg by mouth daily. No current facility-administered medications for this visit. Allergies: Iodides, Contrast  [barium-containing compounds], and Red dye    Social History:   Social History     Tobacco Use   Smoking Status Never Smoker   Smokeless Tobacco Current User    Types: Snuff     Social History     Substance and Sexual Activity   Alcohol Use Yes    Comment: beer- mod         Family History:   Family History   Problem Relation Age of Onset    COPD Mother     Heart Disease Father     Heart Attack Father        REVIEW OF SYSTEMS:  Constitutional: Negative. HENT: Negative. Eyes: Negative. Respiratory: Negative. Cardiovascular: Negative. Gastrointestinal: Negative. Genitourinary: Negative. Musculoskeletal: Positive for back pain and myalgias. Skin: Negative. Neurological: Negative. Endo/Heme/Allergies: Negative. Psychiatric/Behavioral: Negative.         PHYSICAL EXAM:  Vitals:    06/28/21 1042   BP: (!) 172/91   Pulse: 59     Constitutional: appears well-developed and well-nourished.    Eyes - conjunctiva normal.  Pupils react to light  Ear, nose, throat - hearing intact to finger rub, No scars, masses, or lesions over external nose or ears, no atrophy oftongue  Neck- symmetric, no masses noted, no jugular vein distension  Respiration- chest wall appears symmetric, good expansion, normal effort without use of accessory muscles  Musculoskeletal - no significant wasting of muscles noted, no bony deformities, gait no gross ataxia  Extremities- no clubbing, cyanosis oredema  Skin - warm, dry, and intact. No rash, erythema, or pallor. Psychiatric - mood, affect, and behavior appear normal.     Neurologic Examination  Awake, Alert and oriented x 4  Normal speech pattern, following commands    Motor:  RIGHT:     iliopsoas 5/5    knee flexor 5/5    knee extension 5/5    EHL/dorsiflexion 5/5    plantar flexion 5/5    LEFT:      iliopsoas 5/5    knee flexor 5/5    knee extension 5/5    EHL/dorsiflexion 5/5    plantar flexion 5/5    Decrease to pinprick sensation left anterior shin  Reflexes are 2+ and symmetric  No myofacial tenderness to palpation  Very Antalgic Gait pattern        DATA and IMAGING:    Nursing/pcp notes, imaging, labs, and vitals reviewed. PT,OT and/or speech notes reviewed    No results found for: WBC, HGB, HCT, MCV, PLTNo results found for: NA, K, CL, CO2, BUN, CREATININE, GLUCOSE, CALCIUM, PROT, LABALBU, BILITOT, ALKPHOS, AST, ALT, LABGLOM, GFRAA, AGRATIO, GLOBNo results found for: INR, PROTIME      MRI Lumbar Spine (7/30/2019) Monique Short  I have personally reviewed these images and my interpretation is: There is DDD throughout the entire lumbar spine with the worst being L5-S1 that has some autofusion  L3-4 slight anterolisthesis that measures 3mm, mild canal stenosis  L4-5 small anterolisthesis that measures 3-4mm, mild canal stenosis   L5-S1 mild right foraminal stenosis         ASSESSMENT:    Sue Null is a 79 y.o. male with complaints of low back and left leg paresthesias. ICD-10-CM    1. Spinal stenosis of lumbar region without neurogenic claudication  M48.061 MRI LUMBAR SPINE WO CONTRAST     XR LUMBAR SPINE (MIN 4 VIEWS)   2.  DDD (degenerative disc disease), lumbar  M51.36

## 2021-07-13 ENCOUNTER — TELEPHONE (OUTPATIENT)
Dept: NEUROSURGERY | Age: 81
End: 2021-07-13

## 2021-07-13 NOTE — TELEPHONE ENCOUNTER
I received a call from Keenan [employer] @ [place] [phone]. He states he denied patients mri as they did not receive any records or chart notes so they could not approve it. He then asked if I can answer some questions for the patient and I stated I would love to,  I answered all questions ans we were able to get patient mri approved. Approval Number is [id number] and the approval dates are 7-13-21 until 8-12-21.   Patient is scheduled to get his mri done @ [facility] on  August 3rd, @ 415 pm with an arrival of 345 pm.  Patient notified of all this,

## 2021-08-02 ENCOUNTER — OFFICE VISIT (OUTPATIENT)
Dept: GASTROENTEROLOGY | Facility: CLINIC | Age: 81
End: 2021-08-02

## 2021-08-02 ENCOUNTER — TELEPHONE (OUTPATIENT)
Dept: NEUROSURGERY | Age: 81
End: 2021-08-02

## 2021-08-02 VITALS
OXYGEN SATURATION: 98 % | BODY MASS INDEX: 21.81 KG/M2 | HEIGHT: 72 IN | TEMPERATURE: 96.8 F | WEIGHT: 161 LBS | DIASTOLIC BLOOD PRESSURE: 76 MMHG | HEART RATE: 71 BPM | SYSTOLIC BLOOD PRESSURE: 158 MMHG

## 2021-08-02 DIAGNOSIS — K21.9 GASTROESOPHAGEAL REFLUX DISEASE: ICD-10-CM

## 2021-08-02 DIAGNOSIS — K21.9 GASTROESOPHAGEAL REFLUX DISEASE WITHOUT ESOPHAGITIS: Primary | ICD-10-CM

## 2021-08-02 PROCEDURE — 99213 OFFICE O/P EST LOW 20 MIN: CPT | Performed by: INTERNAL MEDICINE

## 2021-08-02 RX ORDER — OMEPRAZOLE 20 MG/1
20 CAPSULE, DELAYED RELEASE ORAL DAILY
Qty: 90 CAPSULE | Refills: 3 | Status: SHIPPED | OUTPATIENT
Start: 2021-08-02 | End: 2022-08-11

## 2021-08-02 NOTE — PROGRESS NOTES
[facility]    Chief Complaint   Patient presents with   • Heartburn       Subjective     HPI    Fabian Velez Sr. is a 80 y.o. male who presents with a chief complaint of heartburn.    He is here with his wife for his annual visit.  He states he is doing well from a GI standpoint.  Denies any symptoms.  He states the omeprazole has kept his heartburn under control since he has been on it.  Denies dysphagia.  Denies any lower intestinal symptoms.  Not having constipation or diarrhea.  No blood in his stools.  What is going on with his health is that he has skin cancer on his left cheek that he has to get resected later this week.  He states earlier today he had to have a steroid injection in his right knee.  He had his knee replaced a year and a half ago but he still having trouble with it.        ================ June 2020 HPI================================================  HPI     Fabian Velez Sr. is a 80 y.o. male who presents with a chief complaint of heartburn.     He comes in for an annual checkup.  He states his heartburn is under control.  He takes omeprazole 20 mg that he takes in the evening.  He is not really having breakthrough symptoms.  We previously talked about him trying to wean off of it but he does not remember ever trying.  He states he always takes it.  Denies dysphasia.  Denies any dyspepsia.  He states everything is going well for him.       He does have atrial fibrillation which is a new medical problem for him.  He states he is on Eliquis now.  That started last winter.  Past Medical History:   Diagnosis Date   • Arthritis    • Atrial fibrillation (CMS/HCC)    • Diverticulosis    • GERD (gastroesophageal reflux disease)    • Hyperlipidemia    • Hypertension    • Plantar fasciitis    • Shingles    • Skin cancer    • Urticaria        Past Surgical History:   Procedure Laterality Date   • COLONOSCOPY  06/02/2016    5yr colon recall based on prep adequate to identify  polyps greater than 6 mm   • ENDOSCOPY  2011   • INGUINAL HERNIA REPAIR     • REPLACEMENT TOTAL KNEE     • SHOULDER SURGERY Bilateral          Current Outpatient Medications:   •  acetaminophen (TYLENOL) 500 MG tablet, Take 1,000 mg by mouth Every 6 (Six) Hours As Needed for Mild Pain ., Disp: , Rfl:   •  apixaban (ELIQUIS) 5 MG tablet tablet, Take 1 tablet by mouth Every 12 (Twelve) Hours., Disp: 180 tablet, Rfl: 3  •  Cholecalciferol (VITAMIN D3) 125 MCG (5000 UT) capsule capsule, Take 5,000 Units by mouth Daily., Disp: , Rfl:   •  dilTIAZem CD (CARDIZEM CD) 240 MG 24 hr capsule, Take 1 capsule by mouth Daily., Disp: 90 capsule, Rfl: 3  •  fenofibrate 160 MG tablet, Take 160 mg by mouth Daily., Disp: , Rfl:   •  metoprolol tartrate (LOPRESSOR) 50 MG tablet, Take 1 tablet by mouth 2 (Two) Times a Day., Disp: 60 tablet, Rfl: 11  •  omeprazole (priLOSEC) 20 MG capsule, Take 1 capsule by mouth Daily., Disp: 90 capsule, Rfl: 3  •  simvastatin (ZOCOR) 10 MG tablet, Take 10 mg by mouth Every Night., Disp: , Rfl:   •  tamsulosin (FLOMAX) 0.4 MG capsule 24 hr capsule, Take 1 capsule by mouth Daily., Disp: 90 capsule, Rfl: 3  •  traMADol (ULTRAM) 50 MG tablet, Take 1 tablet by mouth 3 (Three) Times a Day., Disp: , Rfl:   •  baclofen (LIORESAL) 10 MG tablet, Take 10 mg by mouth Daily., Disp: , Rfl:     Allergies   Allergen Reactions   • Contrast Dye Other (See Comments)     flush       Social History     Socioeconomic History   • Marital status:      Spouse name: Not on file   • Number of children: Not on file   • Years of education: Not on file   • Highest education level: Not on file   Tobacco Use   • Smoking status: Former Smoker     Quit date: 2017     Years since quittin.5   • Smokeless tobacco: Former User     Types: Chew   Vaping Use   • Vaping Use: Never used   Substance and Sexual Activity   • Alcohol use: Yes     Comment: occ   • Drug use: No   • Sexual activity: Defer       Family History    Problem Relation Age of Onset   • Crohn's disease Son    • Heart disease Father    • Cancer Mother    • Stroke Brother    • Colon cancer Neg Hx    • Colon polyps Neg Hx        Review of Systems  Weights been stable, no abdominal discomfort    Objective     Vitals:    08/02/21 1319   BP: 158/76   Pulse: 71   Temp: 96.8 °F (36 °C)   SpO2: 98%       Physical Exam  No acute distress. Vital signs as documented.  Sclera anicteric.  Neck without noticeable JVD. Lungs clear to auscultation. Heart exam notable for regular rhythm, normal sounds. Abdomen is soft, nontender, non distended, normal bowel sounds and without evidence of organomegaly, masses.  Neuro alert, moves extremities.  Does walk with a limp        Assessment/Plan   Problem List Items Addressed This Visit        Gastrointestinal Abdominal     Gastroesophageal reflux disease - Primary    Relevant Medications    omeprazole (priLOSEC) 20 MG capsule            First, regarding his reflux disease I reinforced reflux precautions.  We talked about continue omeprazole versus trying get off of it.  We talked about potential side effects.  At this time is felt to be a relatively safe drug for long-term use but as mentioned, if he can get off of it that would be ideal.  For now he is going continue with reflux precautions and continue omeprazole.  He will come back and see us in a year.    Second, we talked about colon screening examinations.  He is about ready to turn age 81.  He last had a colonoscopy 5 years ago noting diverticulosis.  His prep was not ideal.  He had a normal colonoscopy except for diverticulosis in 2011.  Because he did have an ideal prep we had talked in 2016 about pursuing a screening examination now.  I talked to him about this option.  Considering his advanced age, I did discuss with him that he theoretically would be at increased risk for complication such as perforation, bleeding and there is the risk of having cardiovascular event.  As  discussed, he would have to stop his Eliquis which puts him at increased risk for cardiovascular event.  These risks are still small and rare but not 0.  We discussed the benefit of colonoscopy.  He has had no history of polyps or colon abnormalities thus one was suspect that he will continue on this good prognostic path but 1 does not know the future for certain.  After long conversation talk about the pros and cons he is not going to pursue colonoscopy now.  He states he will get through his skin cancer and his knee problems.  If he decides he wants to pursue screening colonoscopy in the future then he will contact us and let us know.  I think that is a reasonable approach not to pursue colonoscopy at his advanced age.    Continue ongoing management by primary care provider and other specialists.     Body mass index is 21.84 kg/m².  Normal BMI, no GI intervention recommended      EMR Dragon/transcription disclaimer:  Much of this encounter note is electronic transcription/translation of spoken language to printed text.  The electronic translation of spoken language may be erroneous, or at times, nonsensical words or phrases may be inadvertently transcribed.  Although I have reviewed the note for such errors, some may still exist.    Antonio Salgado MD  13:44 CDT  08/02/21

## 2021-08-02 NOTE — TELEPHONE ENCOUNTER
I received a voicemail from patient. He asked for a return call. I called patient but no answer. I left a vm reiterating to patient about his upcoming mri scheduled for tomorrow. I left a vm with all; the details. I also left our number in case patient has further questions or concerns.

## 2021-08-04 ENCOUNTER — TELEPHONE (OUTPATIENT)
Dept: NEUROSURGERY | Age: 81
End: 2021-08-04

## 2021-08-04 NOTE — TELEPHONE ENCOUNTER
Called [facility] and spoke with Lurdes Ahn, he voiced that he would push this patients MRI to [facility] for me today.

## 2021-08-05 ENCOUNTER — HOSPITAL ENCOUNTER (OUTPATIENT)
Dept: GENERAL RADIOLOGY | Age: 81
Discharge: HOME | End: 2021-08-05
Payer: MEDICARE

## 2021-08-05 ENCOUNTER — OFFICE VISIT (OUTPATIENT)
Dept: NEUROSURGERY | Age: 81
End: 2021-08-05
Payer: MEDICARE

## 2021-08-05 VITALS
OXYGEN SATURATION: 97 % | BODY MASS INDEX: 23.7 KG/M2 | HEART RATE: 56 BPM | HEIGHT: 69 IN | DIASTOLIC BLOOD PRESSURE: 81 MMHG | SYSTOLIC BLOOD PRESSURE: 153 MMHG | WEIGHT: 160 LBS

## 2021-08-05 DIAGNOSIS — M48.061 STENOSIS OF LATERAL RECESS OF LUMBAR SPINE: Primary | ICD-10-CM

## 2021-08-05 DIAGNOSIS — M54.42 CHRONIC MIDLINE LOW BACK PAIN WITH LEFT-SIDED SCIATICA: ICD-10-CM

## 2021-08-05 DIAGNOSIS — M51.36 DDD (DEGENERATIVE DISC DISEASE), LUMBAR: ICD-10-CM

## 2021-08-05 DIAGNOSIS — R20.8 DECREASED SENSATION: ICD-10-CM

## 2021-08-05 DIAGNOSIS — M48.061 SPINAL STENOSIS OF LUMBAR REGION WITHOUT NEUROGENIC CLAUDICATION: ICD-10-CM

## 2021-08-05 DIAGNOSIS — R20.2 LEFT LEG PARESTHESIAS: ICD-10-CM

## 2021-08-05 DIAGNOSIS — G89.29 CHRONIC MIDLINE LOW BACK PAIN WITH LEFT-SIDED SCIATICA: ICD-10-CM

## 2021-08-05 PROCEDURE — G8427 DOCREV CUR MEDS BY ELIG CLIN: HCPCS | Performed by: NURSE PRACTITIONER

## 2021-08-05 PROCEDURE — G8420 CALC BMI NORM PARAMETERS: HCPCS | Performed by: NURSE PRACTITIONER

## 2021-08-05 PROCEDURE — 4040F PNEUMOC VAC/ADMIN/RCVD: CPT | Performed by: NURSE PRACTITIONER

## 2021-08-05 PROCEDURE — 1123F ACP DISCUSS/DSCN MKR DOCD: CPT | Performed by: NURSE PRACTITIONER

## 2021-08-05 PROCEDURE — 72110 X-RAY EXAM L-2 SPINE 4/>VWS: CPT

## 2021-08-05 PROCEDURE — 99214 OFFICE O/P EST MOD 30 MIN: CPT | Performed by: NURSE PRACTITIONER

## 2021-08-05 PROCEDURE — 4004F PT TOBACCO SCREEN RCVD TLK: CPT | Performed by: NURSE PRACTITIONER

## 2021-08-05 ASSESSMENT — ENCOUNTER SYMPTOMS
GASTROINTESTINAL NEGATIVE: 1
BACK PAIN: 1
RESPIRATORY NEGATIVE: 1
EYES NEGATIVE: 1

## 2021-08-05 NOTE — PROGRESS NOTES
[facility]  Office Visit      Chief Complaint   Patient presents with    Follow-up    Back Pain    Leg Pain     8/05/2021: Mr. Carlos Duarte returns to clinic today to review the MRI lumbar spine and XR. He continues to have low back and left lateral thigh and leg pain. Some numbness of the left lateral leg as well. He complains mostly of low back pain. Today he states \"the leg pain is not really that bad\". He does complain of some neck pain and left shoulder pain. He reports popping and cracking sounds and limited ROM. HISTORY OF PRESENT ILLNESS:    Lorna Aguilar is a 79 y.o. male who presents with low back and left leg paresthesias that has been present for about 7 months. The pain does not radiate, however, he does have paresthesias of the left lateral thigh, lateral leg. The patient complains of numbness of the left toes. Standing bent over slightly such as putting gas in his mower and driving will exacerbate the left leg paresthesias. Left leg will occasionally have a jerking movement. His pain is not changed when going from a seated to standing position. His pain is not changed with walking. His pain is not changed when lying flat. Overall, indicative that the patient does not have a mechanical nature to their pain. The patient has underwent a non-operative treatment course that has included:  NSAIDs (naproxen, diclofenac)  Muscle Relaxer (baclofen)  Opiates (tramadol)  Physical Therapy (currently in therapy)  Epidural Steroid Injections (Dr. Nory Funk a few years ago)  Massage Therapy  Chiropractic Manipulation    Of note he does not use tobacco and does take blood thinning medications (Eliquis) a fib.                Past Medical History:   Diagnosis Date    DJD (degenerative joint disease) of hip     GERD (gastroesophageal reflux disease)     Hyperlipemia     Hypertension        Past Surgical History:   Procedure Laterality Date    CHG FLUOROSCOPIC GUIDANCE NEEDLE PLACEMENT ADD ON Left 3/29/2018    HIP INJECTION performed by Marcella Cramer DO at [facility] OR    [facility] FLUOROSCOPIC GUIDANCE NEEDLE PLACEMENT ADD ON Left 7/12/2018    LEFT HIP STERIOD INJECTION performed by Marcella Cramer DO at [address] [place] [address] ARTHROSCOPY      bilateral    TOTAL KNEE ARTHROPLASTY      left       Current Outpatient Medications   Medication Sig Dispense Refill    apixaban (ELIQUIS) 5 MG TABS tablet Eliquis 5 mg tablet      baclofen (LIORESAL) 10 MG tablet baclofen 10 mg tablet      vitamin D (CHOLECALCIFEROL) 125 MCG (5000 UT) CAPS capsule cholecalciferol (vitamin D3) 125 mcg (5,000 unit) capsule   one tab daily      dilTIAZem (CARDIZEM CD) 240 MG extended release capsule       metoprolol tartrate (LOPRESSOR) 50 MG tablet       simvastatin (ZOCOR) 10 MG tablet simvastatin 10 mg tablet      tamsulosin (FLOMAX) 0.4 MG capsule Take 0.4 mg by mouth daily      traMADol (ULTRAM) 50 MG tablet Take 1 tablet by mouth 3 times daily.  fenofibrate 160 MG tablet Take 160 mg by mouth daily      omeprazole (PRILOSEC) 20 MG capsule Take 20 mg by mouth daily. No current facility-administered medications for this visit. Allergies: Iodides, Contrast  [barium-containing compounds], and Red dye    Social History:   Social History     Tobacco Use   Smoking Status Never Smoker   Smokeless Tobacco Current User    Types: Snuff     Social History     Substance and Sexual Activity   Alcohol Use Yes    Comment: beer- mod         Family History:   Family History   Problem Relation Age of Onset    COPD Mother     Heart Disease Father     Heart Attack Father        REVIEW OF SYSTEMS:  Constitutional: Negative. HENT: Negative. Eyes: Negative. Respiratory: Negative. Cardiovascular: Negative. Gastrointestinal: Negative. Genitourinary: Negative. Musculoskeletal: Positive for back pain and myalgias. Skin: Negative. Neurological: Negative. Endo/Heme/Allergies: Negative. Psychiatric/Behavioral: Negative.         PHYSICAL EXAM:  Vitals:    08/05/21 1058   BP: (!) 153/81   Pulse: 56   SpO2: 97%     Constitutional: appears well-developed and well-nourished. Eyes - conjunctiva normal.  Pupils react to light  Ear, nose, throat - hearing intact to finger rub, No scars, masses, or lesions over external nose or ears, no atrophy oftongue  Neck- symmetric, no masses noted, no jugular vein distension  Respiration- chest wall appears symmetric, good expansion, normal effort without use of accessory muscles  Musculoskeletal - no significant wasting of muscles noted, no bony deformities, gait no gross ataxia  Extremities- no clubbing, cyanosis oredema  Skin - warm, dry, and intact. No rash, erythema, or pallor. Psychiatric - mood, affect, and behavior appear normal.     Neurologic Examination  Awake, Alert and oriented x 4  Normal speech pattern, following commands    Motor:  RIGHT:     iliopsoas 5/5    knee flexor 5/5    knee extension 5/5    EHL/dorsiflexion 5/5    plantar flexion 5/5    LEFT:      iliopsoas 5/5    knee flexor 5/5    knee extension 5/5    EHL/dorsiflexion 5/5    plantar flexion 5/5    Decrease to pinprick sensation left anterior shin  Reflexes are 2+ and symmetric  No myofacial tenderness to palpation  Very Antalgic Gait pattern        DATA and IMAGING:    Nursing/pcp notes, imaging, labs, and vitals reviewed. PT,OT and/or speech notes reviewed    No results found for: WBC, HGB, HCT, MCV, PLTNo results found for: NA, K, CL, CO2, BUN, CREATININE, GLUCOSE, CALCIUM, PROT, LABALBU, BILITOT, ALKPHOS, AST, ALT, LABGLOM, GFRAA, AGRATIO, GLOBNo results found for: INR, PROTIME      MRI Lumbar Spine (7/30/2019) Laina Felipe  I have personally reviewed these images and my interpretation is:   There is DDD throughout the entire lumbar spine with the worst being L5-S1 that has some autofusion  L3-4 slight anterolisthesis that measures 3mm, mild canal stenosis  L4-5 small anterolisthesis that measures 3-4mm, mild canal stenosis   L5-S1 mild right foraminal stenosis       Narrative   EXAMINATION:  XR LUMBAR SPINE (MIN 4 VIEWS)  8/5/2021 11:51 AM   HISTORY: Spinal stenosis of the lumbar region without neurogenic   claudication. Degenerative disc disease, lumbar region. Chronic   midline low back pain with left-sided sciatica. Left leg paresthesia. Decreased sensation. COMPARISON: No comparison study. TECHNIQUE: 4 views were obtained. FINDINGS: On the AP image, there is lumbar scoliosis to the right. There is severe narrowing of the L5-S1 disc space. There is moderate   to severe narrowing of all of the other lumbar disc spaces. There is   endplate spurring at all levels. There is mild anterior subluxation of   L4 compared to L5 on the neutral lateral image that does not change on   the flexion image. There may be partial reduction on the extension   image. There is facet arthropathy from L2 through S1. There is   atheromatous disease of the abdominal aorta. No acute fracture is   seen.       Impression   Scoliosis and degenerative changes of the lumbar spine, as   described. Signed by Dr Oswald Villalta   I have personally reviewed the images and my interpretation is:  Spondylolisthesis of L3-4, L4-5 with no major motion on flexion and extension       MRI Lumbar Spine (7/22/2021) Jeet Peña  I have personally reviewed the images and my interpretation is:  DDD throughout, very severe at L5-S1  There is a slight dextroscoliosis with the apex at L3  L3-4 moderate to severe left lateral recess stenosis, compression of the bilateral L4 nerve root, moderate right lateral recess stenosis. L4-5 moderate left lateral recess stenosis       ASSESSMENT:    Emil Parham is a 79 y.o. male with complaints of low back and left leg paresthesias. ICD-10-CM    1. Stenosis of lateral recess of lumbar spine  M48.061    2.  Spinal stenosis of lumbar region without neurogenic claudication  M48.061    3. DDD (degenerative disc disease), lumbar  M51.36    4. Chronic midline low back pain with left-sided sciatica  M54.42     G89.29    5. Left leg paresthesias  R20.2        PLAN:  -We have discussed and reviewed the results of the MRI lumbar spine with Mr. Vivian Tobar at length. We explained that he does have narrowing that correlates with his LLE pain. That being said, he states he has mostly low back pain. If we were to perform a surgery, it would be to alleviate the leg pain. He would still have back pain following surgery. Given that his leg pain is not that bad, we are reluctant to move forward with surgery at this point. We are going to recommend he continue with non-operative treatments. He has really attempted almost all non-operative treatments that we know to offer.     -TENS unit  -Follow up as needed for now         KYLE Gallo

## 2021-08-12 ENCOUNTER — TELEPHONE (OUTPATIENT)
Dept: VASCULAR SURGERY | Facility: CLINIC | Age: 81
End: 2021-08-12

## 2021-08-18 ENCOUNTER — TELEPHONE (OUTPATIENT)
Dept: VASCULAR SURGERY | Facility: CLINIC | Age: 81
End: 2021-08-18

## 2021-08-18 NOTE — TELEPHONE ENCOUNTER
Left message reminding Mr Velez of his appointment for Thursday, August 19th, 2021 at 115 pm with Dr Ba. Also advised Mr Velez if he had any questions, concerns or needs to reschedule to please call the office at [phone].

## 2021-08-19 ENCOUNTER — OFFICE VISIT (OUTPATIENT)
Dept: VASCULAR SURGERY | Facility: CLINIC | Age: 81
End: 2021-08-19

## 2021-08-19 VITALS
WEIGHT: 163 LBS | HEIGHT: 72 IN | BODY MASS INDEX: 22.08 KG/M2 | SYSTOLIC BLOOD PRESSURE: 136 MMHG | DIASTOLIC BLOOD PRESSURE: 82 MMHG

## 2021-08-19 DIAGNOSIS — I65.23 BILATERAL CAROTID ARTERY STENOSIS: Primary | ICD-10-CM

## 2021-08-19 DIAGNOSIS — I10 ESSENTIAL HYPERTENSION: ICD-10-CM

## 2021-08-19 PROCEDURE — 99204 OFFICE O/P NEW MOD 45 MIN: CPT | Performed by: SURGERY

## 2021-08-19 NOTE — PROGRESS NOTES
08/19/2021      Mariely Desir APRN  [address] [place] [address] [address]  [place] [address]    Fabian Velez Sr.  1940    Chief Complaint   Patient presents with   • Establish Care     Referred over by Mariely LUJAN for Carotid Artery Stenosis. Test in Media @ [facility] [phone]. Patient denies any stroke like symptoms.    • Former Smoker     Patient is a Former Smoker - Quit 2017   • Med Management     Verbally verified medications with patient        Dear TAI Harvey    HPI  I had the pleasure of seeing your patient Fabian Velez Sr. in the office today.  Thank you kindly for this consultation.  As you recall, Fabian Velez Sr. is a 80 y.o.  male who you are currently following for routine health maintenance. He denies any strokelike symptoms.  He is maintained on Eliquis for atrial fibrillation.  He is also maintained on Zocor.  He does reports back problems for many years.  He did have noninvasive testing performed at [facility], which I did review in office.       Past Medical History:   Diagnosis Date   • Arthritis    • Atrial fibrillation (CMS/HCC)    • Diverticulosis    • GERD (gastroesophageal reflux disease)    • Hyperlipidemia    • Hypertension    • Plantar fasciitis    • Shingles    • Skin cancer    • Urticaria        Past Surgical History:   Procedure Laterality Date   • COLONOSCOPY  06/02/2016    5yr colon recall based on prep adequate to identify polyps greater than 6 mm   • ENDOSCOPY  06/14/2011   • INGUINAL HERNIA REPAIR     • REPLACEMENT TOTAL KNEE     • SHOULDER SURGERY Bilateral        Family History   Problem Relation Age of Onset   • Crohn's disease Son    • Heart disease Father    • Cancer Mother    • Stroke Brother    • Colon cancer Neg Hx    • Colon polyps Neg Hx        Social History     Socioeconomic History   • Marital status:      Spouse name: Not on file   • Number of children: Not on file   • Years of education: Not on file   • Highest education level: Not on file    Tobacco Use   • Smoking status: Former Smoker     Quit date:      Years since quittin.6   • Smokeless tobacco: Former User     Types: Chew   Vaping Use   • Vaping Use: Never used   Substance and Sexual Activity   • Alcohol use: Yes     Comment: occ   • Drug use: No   • Sexual activity: Defer       Allergies   Allergen Reactions   • Contrast Dye Other (See Comments)     flush         Current Outpatient Medications:   •  acetaminophen (TYLENOL) 500 MG tablet, Take 1,000 mg by mouth Every 6 (Six) Hours As Needed for Mild Pain ., Disp: , Rfl:   •  apixaban (ELIQUIS) 5 MG tablet tablet, Take 1 tablet by mouth Every 12 (Twelve) Hours., Disp: 180 tablet, Rfl: 3  •  baclofen (LIORESAL) 10 MG tablet, Take 10 mg by mouth Daily., Disp: , Rfl:   •  Cholecalciferol (VITAMIN D3) 125 MCG (5000 UT) capsule capsule, Take 5,000 Units by mouth Daily., Disp: , Rfl:   •  dilTIAZem CD (CARDIZEM CD) 240 MG 24 hr capsule, Take 1 capsule by mouth Daily., Disp: 90 capsule, Rfl: 3  •  fenofibrate 160 MG tablet, Take 160 mg by mouth Daily., Disp: , Rfl:   •  metoprolol tartrate (LOPRESSOR) 50 MG tablet, Take 1 tablet by mouth 2 (Two) Times a Day., Disp: 60 tablet, Rfl: 11  •  omeprazole (priLOSEC) 20 MG capsule, Take 1 capsule by mouth Daily., Disp: 90 capsule, Rfl: 3  •  simvastatin (ZOCOR) 10 MG tablet, Take 10 mg by mouth Every Night., Disp: , Rfl:   •  tamsulosin (FLOMAX) 0.4 MG capsule 24 hr capsule, Take 1 capsule by mouth Daily., Disp: 90 capsule, Rfl: 3  •  traMADol (ULTRAM) 50 MG tablet, Take 1 tablet by mouth 3 (Three) Times a Day., Disp: , Rfl:     Review of Systems   Constitutional: Negative.    HENT: Negative.    Eyes: Negative.    Respiratory: Negative.    Cardiovascular: Negative.    Gastrointestinal: Negative.    Endocrine: Negative.    Genitourinary: Negative.    Musculoskeletal: Positive for arthralgias, back pain and neck pain.   Skin: Negative.    Allergic/Immunologic: Negative.    Neurological: Positive for  "numbness (left leg, chronic back ).   Hematological: Negative.    Psychiatric/Behavioral: Negative.    All other systems reviewed and are negative.    /82 (BP Location: Right arm, Patient Position: Sitting, Cuff Size: Adult)   Ht 182.9 cm (72\")   Wt 73.9 kg (163 lb)   BMI 22.11 kg/m²     Physical Exam  Vitals and nursing note reviewed.   Constitutional:       Appearance: Normal appearance. He is well-developed and normal weight.   HENT:      Head: Normocephalic and atraumatic.   Eyes:      General: No scleral icterus.     Pupils: Pupils are equal, round, and reactive to light.   Neck:      Thyroid: No thyromegaly.      Vascular: No carotid bruit or JVD.   Cardiovascular:      Rate and Rhythm: Normal rate and regular rhythm.      Pulses:           Carotid pulses are 2+ on the right side and 2+ on the left side.       Femoral pulses are 2+ on the right side and 2+ on the left side.       Popliteal pulses are 2+ on the right side and 2+ on the left side.        Dorsalis pedis pulses are 2+ on the right side and 2+ on the left side.        Posterior tibial pulses are 2+ on the right side and 2+ on the left side.      Heart sounds: Normal heart sounds.   Pulmonary:      Effort: Pulmonary effort is normal.      Breath sounds: Normal breath sounds.   Abdominal:      General: Bowel sounds are normal. There is no distension or abdominal bruit.      Palpations: Abdomen is soft. There is no mass.      Tenderness: There is no abdominal tenderness.   Musculoskeletal:         General: Normal range of motion.      Cervical back: Neck supple.   Lymphadenopathy:      Cervical: No cervical adenopathy.   Skin:     General: Skin is warm and dry.   Neurological:      Mental Status: He is alert and oriented to person, place, and time.      Cranial Nerves: No cranial nerve deficit.      Sensory: No sensory deficit.   Psychiatric:         Mood and Affect: Mood normal.         Behavior: Behavior normal.         Thought Content: " Thought content normal.         Judgment: Judgment normal.       Diagnostic Data:      Patient Active Problem List   Diagnosis   • Benign prostatic hyperplasia with lower urinary tract symptoms   • Poor urinary stream   • Elevated prostate specific antigen (PSA)   • Gastroesophageal reflux disease   • Atrial fibrillation (CMS/HCC)   • Hypertension   • Low back pain   • Encounter for screening for malignant neoplasm of colon   • Coagulopathy (CMS/HCC)   • Chronic anticoagulation   • Hyperlipidemia        Diagnosis Plan   1. Bilateral carotid artery stenosis     2. Essential hypertension         Plan: After thoroughly evaluating Fabian Velez Sr., I believe the best course of action is to remain conservative from a vascular surgery standpoint.  I did review his testing from [facility] which shows 50-69% per report.  I will have him bring the images for me to review.  Once I have reviewed these, I will call to discuss.  His feet are nice and warm with palpable pulses.  I did discuss vascular risk factors as they pertain to the progression of vascular disease including controlling his hypertension.  This risk factor is currently stable. The patient is to continue taking their medications as previously discussed.   This was all discussed in full with complete understanding.  Thank you for allowing me to participate in the care of your patient.  Please do not hesitate to call with any questions or concerns.  We will keep you aware of any further encounters with Fabian Velez Sr..        Sincerely yours,         DO Saud Valdes Jeffrey L, MD

## 2021-08-25 DIAGNOSIS — I65.23 BILATERAL CAROTID ARTERY STENOSIS: Primary | ICD-10-CM

## 2021-10-22 ENCOUNTER — OFFICE VISIT (OUTPATIENT)
Dept: CARDIOLOGY | Facility: CLINIC | Age: 81
End: 2021-10-22

## 2021-10-22 VITALS
OXYGEN SATURATION: 98 % | BODY MASS INDEX: 21.4 KG/M2 | HEART RATE: 63 BPM | DIASTOLIC BLOOD PRESSURE: 85 MMHG | SYSTOLIC BLOOD PRESSURE: 160 MMHG | HEIGHT: 72 IN | WEIGHT: 158 LBS

## 2021-10-22 DIAGNOSIS — I47.1 PSVT (PAROXYSMAL SUPRAVENTRICULAR TACHYCARDIA) (HCC): ICD-10-CM

## 2021-10-22 DIAGNOSIS — E78.2 MIXED HYPERLIPIDEMIA: ICD-10-CM

## 2021-10-22 DIAGNOSIS — Z79.01 CHRONIC ANTICOAGULATION: ICD-10-CM

## 2021-10-22 DIAGNOSIS — I10 PRIMARY HYPERTENSION: ICD-10-CM

## 2021-10-22 DIAGNOSIS — I48.0 PAROXYSMAL ATRIAL FIBRILLATION (HCC): Primary | ICD-10-CM

## 2021-10-22 PROBLEM — I47.10 PSVT (PAROXYSMAL SUPRAVENTRICULAR TACHYCARDIA): Status: ACTIVE | Noted: 2021-10-22

## 2021-10-22 PROCEDURE — 93000 ELECTROCARDIOGRAM COMPLETE: CPT | Performed by: INTERNAL MEDICINE

## 2021-10-22 PROCEDURE — 99214 OFFICE O/P EST MOD 30 MIN: CPT | Performed by: INTERNAL MEDICINE

## 2021-10-22 RX ORDER — CARVEDILOL 3.12 MG/1
3.12 TABLET ORAL 2 TIMES DAILY
Qty: 180 TABLET | Refills: 3 | Status: SHIPPED | OUTPATIENT
Start: 2021-10-22 | End: 2023-02-16

## 2021-10-22 NOTE — PROGRESS NOTES
Subjective:     Encounter Date: 10/22/21      Patient ID: Fabian Velez Sr. is a 81 y.o. male.    Chief Complaint: Follow-up PAF, SVT, hypertension    History of Present Illness      81-year-old returns for follow-up of paroxysmal atrial fibrillation.  By way of review, I first saw him in '20 for new/recent diagnosis of paroxysmal atrial fibrillation.  He was in [facility] with a family member when he started noticing a funny feeling that he described as lightheadedness.  He was noted to be in atrial fibrillation, with rapid ventricular response and after a brief overnight admission there he was discharged home on Eliquis and diltiazem.  He also has limiting back pain that he was needing a series of back injections for, with advice regarding anticoagulation around those.  He wore a 14-day Zio patch in May 2020 to see if he was having any other occult atrial fibrillation, and he was not.  We recommended that he remain on Eliquis, but to take aspirin 81 mg daily if Eliquis had to be held for his injections.  He did well through the next couple follow-up visits and then when he saw Jenny in the office earlier this year, he noted some palpitations described as episodes lasting 10 to 15 minutes at a time usually twice per month but had some episodes lasting up to 4 to 5 hours.  A Zio patch was placed which showed PACs and short runs of SVT, but no atrial fibrillation.  His Lopressor dose was increased.    When he last saw Jenny Sun in office on 4/9/2021, she document of the following: The patient reports he has been feeling better overall since increasing metoprolol.  He reports he did have some sustained palpitations without any other significant symptoms the day after he took his monitor off.  He denies any significant palpitations since that time, but will have some milder occasional pounding/fluttering that will last a few minutes a couple times per week.  He denies any chest pain.   "He reports shortness of breath with exertion, which he relates to deconditioning after prior surgeries and back pain.  He denies edema, orthopnea, PND, syncope or presyncope.  He reports compliance with medications and good blood pressure control.  No changes in medical management were recommended at that visit, he returns today for routine follow-up.    Overall, no changes or complaints in palpitations.  Is noticing some exertional dyspnea in the afternoon. Did not get SOA walking in to office today, but \"has to take it kind of slow due to that knee surgery.\"  No other chest discomfort or sustained episodes of palpitations.    His main c/o is right knee pain, though was already replaced by Dr Zendejas.  Otherwise, he tells me he has not been getting back injections \"because i'm on that eliquis for afib.\"    He does note that his blood pressure has been running high of late.  No associated symptoms.        The following portions of the patient's history were reviewed and updated as appropriate: allergies, current medications, past family history, past medical history, past social history, past surgical history and problem list.    Review of Systems   Constitutional: Negative for malaise/fatigue.   Cardiovascular: Positive for dyspnea on exertion and palpitations (improved ). Negative for chest pain, claudication, leg swelling, near-syncope, orthopnea, paroxysmal nocturnal dyspnea and syncope.   Respiratory: Positive for shortness of breath.    Hematologic/Lymphatic: Does not bruise/bleed easily.   Musculoskeletal: Negative for falls.   Neurological: Positive for headaches. Negative for dizziness and light-headedness.   Psychiatric/Behavioral:        Increased stress        Allergies   Allergen Reactions   • Contrast Dye Other (See Comments)     flush       Current Outpatient Medications:   •  acetaminophen (TYLENOL) 500 MG tablet, Take 1,000 mg by mouth Every 6 (Six) Hours As Needed for Mild Pain ., Disp: , Rfl:   •  " "apixaban (ELIQUIS) 5 MG tablet tablet, Take 1 tablet by mouth Every 12 (Twelve) Hours., Disp: 180 tablet, Rfl: 3  •  baclofen (LIORESAL) 10 MG tablet, Take 10 mg by mouth Daily., Disp: , Rfl:   •  Cholecalciferol (VITAMIN D3) 125 MCG (5000 UT) capsule capsule, Take 5,000 Units by mouth Daily., Disp: , Rfl:   •  dilTIAZem CD (CARDIZEM CD) 240 MG 24 hr capsule, Take 1 capsule by mouth Daily., Disp: 90 capsule, Rfl: 3  •  fenofibrate 160 MG tablet, Take 160 mg by mouth Daily., Disp: , Rfl:   •  metoprolol tartrate (LOPRESSOR) 50 MG tablet, Take 1 tablet by mouth 2 (Two) Times a Day. (Patient taking differently: Take 25 mg by mouth 2 (Two) Times a Day.), Disp: 60 tablet, Rfl: 11  •  omeprazole (priLOSEC) 20 MG capsule, Take 1 capsule by mouth Daily., Disp: 90 capsule, Rfl: 3  •  simvastatin (ZOCOR) 10 MG tablet, Take 10 mg by mouth Every Night., Disp: , Rfl:   •  tamsulosin (FLOMAX) 0.4 MG capsule 24 hr capsule, Take 1 capsule by mouth Daily., Disp: 90 capsule, Rfl: 3  •  traMADol (ULTRAM) 50 MG tablet, Take 1 tablet by mouth 3 (Three) Times a Day., Disp: , Rfl:          Objective:    /85   Pulse 63   Ht 182.9 cm (72\")   Wt 71.7 kg (158 lb)   SpO2 98%   BMI 21.43 kg/m²        Vitals and nursing note reviewed.   Constitutional:       General: Not in acute distress.     Appearance: Not in distress.   Neck:      Vascular: No JVD or JVR. JVD normal.   Pulmonary:      Effort: Pulmonary effort is normal.      Breath sounds: Normal breath sounds.   Cardiovascular:      Normal rate. Regular rhythm.      Murmurs: There is no murmur.      No gallop. No rub.   Pulses:     Intact distal pulses.   Skin:     General: Skin is warm and dry.   Neurological:      Mental Status: Alert, oriented to person, place, and time and oriented to person, place and time.         Lab Review:           ECG 12 Lead    Date/Time: 10/22/2021 11:16 AM  Performed by: Jeremie Lala MD  Authorized by: Jeremie Lala MD   Comparison: compared " with previous ECG from 4/9/2021  Similar to previous ECG  Rhythm: sinus rhythm  Other findings: non-specific ST-T wave changes and left ventricular hypertrophy    Clinical impression: abnormal EKG              Echo report previously reviewed:  1/21/2020.  LVEF 68% with mild LVH.  Left and right atria both said to be normal in size.  Normal size and function of the RV.  No significant valvular pathology noted.    5/2020 14 day monitor -   · A relatively benign monitor study.  · Predominant rhythm is normal sinus rhythm.  · Numerous runs of short, nonsustained supraventricular tachycardia.  · Two patient reported episodes of symptoms, including lightheadedness, and palpitations, corresponded with frequent bursts of short, nonsustained supraventricular tachycardia.  · Occasional (3.4%) isolated PACs.    2/2021 14 day monitor :    · An abnormal monitor study.  · Predominant rhythm was normal sinus rhythm.  · Patient reported symptoms on 5 occasions. When palpitations were reported, it seemed to correlate with PACs and/or short runs of SVT. When chest pain was reported alone (i.e. not with palpitations), it seemed to only be at times of normal sinus rhythm.  · No sustained arrhythmias, but there were numerous short episodes of SVT (longest was 18 seconds).  · No evidence of atrial fibrillation or flutter.  · Occasional (2.6%) PACs.      Assessment:      Problem List Items Addressed This Visit        Cardiac and Vasculature    Atrial fibrillation (HCC) - Primary    Overview     Diagnosed Jan '20 - was symptomatic at time of diagnosis with AF/RVR (rate 150s) - was light-headed    CHADS-VASc Risk Assessment            3       Total Score        1 Hypertension    2 Age >/= 75        Criteria that do not apply:    CHF    DM    PRIOR STROKE/TIA/THROMBO    Vascular Disease    Age 65-74    Sex: Female                 Hypertension    Hyperlipidemia    PSVT (paroxysmal supraventricular tachycardia) (HCC)       Coag and  Thromboembolic    Chronic anticoagulation          Plan:     1.  Paroxysmal atrial fibrillation: Established problem, stable.   -Continue Eliquis 5 mg twice daily for anticoagulation (replace this with aspirin 81 mg daily when receiving back injections, then resume Eliquis and DC aspirin post procedure when safe from a bleeding standpoint)  -Continue beta-blocker (though changing agent as per below) and diltiazem 240 mg daily for rate control with recurrences of atrial fibrillation     2.  Essential hypertension: worsening control  -switch lopressor to coreg for better BP control; continue current dose cardizem  -We discussed goal blood pressure (average <140/<90) --I asked him to keep a log at home and review further with Dr. Villavicencio to see if other dose changes are required, or addition of other agents, to achieve goal blood pressure    3.  Hyperlipidemia: no recent labs available  -Continue statin-management per PCP    4.  PSVT: stable; as previously corresponded with symptomatic complaints on different outpatient rhythm monitoring studies.    -Continue BB and calcium channel blocker for suppression    5.  Chronic anticoagulation: Continue Eliquis as per above, though can hold when needed for other necessary procedures    F/u 1 year, or sooner with any new or worsening symptoms)    Jeremie Lala MD  11:21 CDT  10/22/21

## 2021-10-27 ENCOUNTER — OFFICE VISIT (OUTPATIENT)
Dept: NEUROSURGERY | Facility: CLINIC | Age: 81
End: 2021-10-27

## 2021-10-27 VITALS — BODY MASS INDEX: 21.4 KG/M2 | HEIGHT: 72 IN | WEIGHT: 158 LBS

## 2021-10-27 DIAGNOSIS — M54.59 MECHANICAL LOW BACK PAIN: ICD-10-CM

## 2021-10-27 DIAGNOSIS — M54.2 CERVICALGIA: Primary | ICD-10-CM

## 2021-10-27 PROCEDURE — 99214 OFFICE O/P EST MOD 30 MIN: CPT | Performed by: NURSE PRACTITIONER

## 2021-10-27 NOTE — PROGRESS NOTES
"Primary Care Provider: Kaleb Villavicencio MD  Requesting Provider: Mariely Desir APRN    Chief Complaint:   Chief Complaint   Patient presents with   • Neck Pain     Pt is referred here for neck pain but states his back pain is worse.      • Back Pain     History of Present Illness  Consultation today at the request of TAI Harvey    HISTORY/ HPI:  Fabian Velez Sr. is a 81 y.o.  male who present today with with a complaint of intermittent neck and lumbar back pain.  No previous spine surgeries.  No known injury.    In regards to his cervical spine, he currently complains of intermittent left-sided neck pain that radiates to the left trapezius muscle.  He describes his discomfort as an \"ache\" that results in frequent headaches.  He denies significant upper extremity radicular pain or dysesthesias, however reports point specific discomfort to the right elbow and left deltoid.  No significant aggravating factors his discomfort occurs randomly.  Alleviating factors include intermittent use of Tylenol and baclofen.  He denies a decline in fine motor skills or frequently dropped items, however report a poor gait and balance necessitating use of a cane while ambulating but denies falls.    In regards to his lumbar spine, he currently complains of constant lumbar back pain that waxes and wanes in severity.  He denies lower extremity radicular pain, weakness, numbness, or tingling.  His discomfort worsens with prolonged standing and walking and with physical activity that requires twisting.  Alleviating factors include use of tramadol, and while sitting and/or lying down.  He denies fevers, chills, night sweats, unexplained weight loss, saddle anesthesia, or bowel bladder dysfunction.  He currently rates the severity of his symptoms 4/10.  No additional concerns this time.    Mr. Velez has not completed nor participated in a dedicated course of physician directed physical therapy, massage and/or " chiropractic care, nor been evaluated by pain management.    Oswestry Disability Index = 32%    Score   Pain Intensity Fairly severe pain - 3   Personal Care Look after myself normally but causes extra pain-1   Lifting Pain prevents me from lifting heavy weights, but medium weights on table-3   Reading Read with slight pain-1   Headaches Severe frequent headaches-4   Concentration Concentrate Fully-0   Work I can do my usual work, but no more-2   Driving Drive with slight pain-1   Sleeping No trouble sleeping-0   Recreation All recreational activities with some pain-1     SCORE INTERPRETATION OF THE OSWESTRY NECK DISABILITY QUESTIONNAIRE  30-48: Moderate disability   (Ольга et al, 1980)    Modified [employer] [employer] (mJOA) score  CATEGORY SCORE   Upper extremity Motor Subscore Normal hand coordination-5   Lower Extremity Subscore Able to walk without walking aid but must hold handrail-4   Upper Extremity Sensory Score Normal hand sensation-3   Urinary Function Subscore Urinary incontinence more than once per month-1   TOTAL = 13/18    The mJOA is an 18 point score of functional disability specific to cervical myelopathy.   12-14: Moderate Myelopathy  Benzel et al. (1991). Rubina et al.     The mJOA is an 18 point score of functional disability specific to cervical myelopathy. Torri et al. (1991).[2]    ROS:  Review of Systems   Constitutional: Negative.    Eyes: Negative.    Respiratory: Negative.    Cardiovascular: Negative.    Gastrointestinal: Negative.    Endocrine: Negative.    Genitourinary: Negative.    Musculoskeletal: Positive for back pain, gait problem, neck pain and neck stiffness.   Skin: Negative.    Allergic/Immunologic: Negative.    Neurological: Positive for headaches. Negative for weakness and numbness.   Hematological: Negative.    Psychiatric/Behavioral: Negative.    All other systems reviewed and are negative.    Past Medical History:   Diagnosis Date   • Arthritis    •  Atrial fibrillation (HCC)    • Diverticulosis    • GERD (gastroesophageal reflux disease)    • Hyperlipidemia    • Hypertension    • Plantar fasciitis    • Shingles    • Skin cancer    • Urticaria      Past Surgical History:   Procedure Laterality Date   • COLONOSCOPY  06/02/2016    5yr colon recall based on prep adequate to identify polyps greater than 6 mm   • ENDOSCOPY  06/14/2011   • INGUINAL HERNIA REPAIR     • REPLACEMENT TOTAL KNEE     • SHOULDER SURGERY Bilateral      Family History: family history includes Cancer in his mother; Crohn's disease in his son; Heart disease in his father; Stroke in his brother.    Social History:  reports that he quit smoking about 4 years ago. He has quit using smokeless tobacco.  His smokeless tobacco use included chew. He reports current alcohol use of about 4.0 - 5.0 standard drinks of alcohol per week. He reports that he does not use drugs.    Medications:    Current Outpatient Medications:   •  acetaminophen (TYLENOL) 500 MG tablet, Take 1,000 mg by mouth Every 6 (Six) Hours As Needed for Mild Pain ., Disp: , Rfl:   •  apixaban (ELIQUIS) 5 MG tablet tablet, Take 1 tablet by mouth Every 12 (Twelve) Hours., Disp: 180 tablet, Rfl: 3  •  baclofen (LIORESAL) 10 MG tablet, Take 10 mg by mouth Daily., Disp: , Rfl:   •  carvedilol (COREG) 3.125 MG tablet, Take 1 tablet by mouth 2 (Two) Times a Day., Disp: 180 tablet, Rfl: 3  •  Cholecalciferol (VITAMIN D3) 125 MCG (5000 UT) capsule capsule, Take 5,000 Units by mouth Daily., Disp: , Rfl:   •  dilTIAZem CD (CARDIZEM CD) 240 MG 24 hr capsule, Take 1 capsule by mouth Daily., Disp: 90 capsule, Rfl: 3  •  fenofibrate 160 MG tablet, Take 160 mg by mouth Daily., Disp: , Rfl:   •  omeprazole (priLOSEC) 20 MG capsule, Take 1 capsule by mouth Daily., Disp: 90 capsule, Rfl: 3  •  simvastatin (ZOCOR) 10 MG tablet, Take 10 mg by mouth Every Night., Disp: , Rfl:   •  tamsulosin (FLOMAX) 0.4 MG capsule 24 hr capsule, Take 1 capsule by mouth  "Daily., Disp: 90 capsule, Rfl: 3  •  traMADol (ULTRAM) 50 MG tablet, Take 1 tablet by mouth 3 (Three) Times a Day., Disp: , Rfl:     Allergies:  Contrast dye    OBJECTIVE:  Objective   Ht 182.9 cm (72\")   Wt 71.7 kg (158 lb)   BMI 21.43 kg/m²   Physical Exam  Vitals and nursing note reviewed.   Constitutional:       General: He is not in acute distress.     Appearance: Normal appearance. He is well-developed, well-groomed and normal weight. He is not ill-appearing, toxic-appearing or diaphoretic.   HENT:      Head: Normocephalic and atraumatic.      Right Ear: Hearing normal.      Left Ear: Hearing normal.   Eyes:      Extraocular Movements: EOM normal.      Conjunctiva/sclera: Conjunctivae normal.      Pupils: Pupils are equal, round, and reactive to light.   Neck:      Trachea: Trachea normal.   Cardiovascular:      Rate and Rhythm: Normal rate and regular rhythm.   Pulmonary:      Effort: Pulmonary effort is normal. No tachypnea, bradypnea, accessory muscle usage or respiratory distress.   Abdominal:      Palpations: Abdomen is soft.   Musculoskeletal:      Cervical back: Full passive range of motion without pain and neck supple.   Skin:     General: Skin is warm and dry.   Neurological:      Mental Status: He is alert and oriented to person, place, and time.      GCS: GCS eye subscore is 4. GCS verbal subscore is 5. GCS motor subscore is 6.      Deep Tendon Reflexes:      Reflex Scores:       Tricep reflexes are 3+ on the right side and 3+ on the left side.       Bicep reflexes are 3+ on the right side and 3+ on the left side.       Brachioradialis reflexes are 3+ on the right side and 3+ on the left side.       Patellar reflexes are 2+ on the right side and 2+ on the left side.       Achilles reflexes are 0 on the right side and 0 on the left side.  Psychiatric:         Speech: Speech normal.         Behavior: Behavior normal. Behavior is cooperative.       Neurologic Exam     Mental Status   Oriented to " person, place, and time.   Attention: normal. Concentration: normal.   Speech: speech is normal   Level of consciousness: alert    Cranial Nerves     CN II   Visual fields full to confrontation.     CN III, IV, VI   Pupils are equal, round, and reactive to light.  Extraocular motions are normal.     CN V   Facial sensation intact.     CN VII   Facial expression full, symmetric.     CN VIII   CN VIII normal.     CN IX, X   CN IX normal.     CN XI   CN XI normal.     Motor Exam   Right arm tone: normal  Left arm tone: normal  Right leg tone: normal  Left leg tone: normal    Strength   Right deltoid: 5/5  Left deltoid: 5/5  Right biceps: 5/5  Left biceps: 5/5  Right triceps: 5/5  Left triceps: 5/5  Right wrist extension: 5/5  Left wrist extension: 5/5  Right iliopsoas: 5/5  Left iliopsoas: 5/5  Right quadriceps: 5/5  Left quadriceps: 5/5  Right anterior tibial: 5/5  Left anterior tibial: 5/5  Right gastroc: 5/5  Left gastroc: 5/5  Right EHL 5/5  Left EHL 5/5       Sensory Exam   Right arm light touch: normal  Left arm light touch: normal  Right leg light touch: normal  Left leg light touch: normal    Gait, Coordination, and Reflexes     Tremor   Resting tremor: absent  Intention tremor: absent  Action tremor: absent    Reflexes   Right brachioradialis: 3+  Left brachioradialis: 3+  Right biceps: 3+  Left biceps: 3+  Right triceps: 3+  Left triceps: 3+  Right patellar: 2+  Left patellar: 2+  Right achilles: 0  Left achilles: 0  Right plantar: equivocal  Left plantar: equivocal  Right Denney: absent  Left Denney: absent  Right ankle clonus: present  Left ankle clonus: present  Right pendular knee jerk: absent  Antalgic gait     Male  strength (pounds)  AGE Right Hand RH Norms Left Hand LH Norms   20-24  121+20.6  104+21.8   25-29  120+23.0  110+16.2   30-34  121+22.4  110+21.7   35-39  119+24  113+21.7   40-44  117+20.7  112+18.7   45-49  110+23.0  101+22.8   50-54  113+18.1  102+17   55-59  101+26.7  83+23.4    60-64  90+20.4  77+20.3   65-69  91+20.6  76.8+19.8   70-74  75+21.5  65+18.1   75+ *42 66+21.0 40 55+17.0   (EDDI Espana et al; Hand Dynometer: Effects of trials and sessions.  Perpetual and Motor Skills 61:195-8, 1985)  * = Dominant hand  > = Intervention    Imaging: (independent review and interpretation)  8/4/2021 7/22/2021.  MRI of the lumbar spine shows no STIR signal change concerning for acute fractures, the conus terminates at normal level, degenerative listhesis of L3 over L4 and L4 over L5, multilevel facet arthropathy and ligamentous flavum hypertrophy resulting in left foraminal stenosis at L2-3, thecal sac compression and bilateral foraminal narrowing at L3-4 and L4-5, and right foraminal stenosis at L5-S1.          ASSESSMENT/ PLAN:  Fabian Velez Sr. is a 81 y.o. male with a significant medical history of A. fib currently on Eliquis, hypertension, hyperlipidemia, skin cancer, vitamin D deficiency, BPH, GERD, hyperlipidemia, and shingles.  He presents with a new problem of axial neck pain and mechanical low back pain. CAIT: 32.  mJOA: 13.  Physical exam findings of right  strength 1 standard deviation below age-matched controls, left  strength 0.5 standard deviations below age-matched controls, 3+ upper extremity reflexes, absent bilateral Achilles reflexes, equivocal plantar reflexes, 1 subtle beat of clonus bilaterally, and an antalgic gait.  His imaging of the cervical spine shows no acute fractures or malalignment, multilevel degenerative changes.  His MRI of the lumbar spine    TREATMENT RECOMMENDATIONS ...  Axial Neck Pain:    Define as neck pain without significant radiculopathy or weakness.  This can include referred pain radiating to shoulders or posterior arms without decent below the elbows.     Differential diagnosis:   Trauma: cervical sprain including whiplash, myofacial pain, fracture of the cervical spine  Degenerative: Cervical spondylosis, facet arthropathy, central  herniated cervical disc with myelopathy, cervical disc disease, coronal or sagittal spinal malalignment, vertebral artery dissection.   Neuropathic: occipital neuralgia, Chiari malformation  Rheumatologic: fibromyalgia, crystal deposition disease including gout, pseudogout, hydroxyapatite, CPPD crystal deposition disease.  Infectious: discitis, vertebral osteomyelitis, spinal epidural abscess,   Neoplastic: vertebral body tumor, intradural tumor, multiple myeloma,   Psychiatric: malingering, conversion disorder and secondary gain are diagnoses of exclusion.    For further evaluation of his neck pain we will send him for x-rays of the cervical spine complete with flexion extension to assess for areas of instability, as well as a noncontrasted MRI of the cervical spine to rule out need for surgical intervention.  We will have him return for reassessment with Dr. Rosado at his next available appointment.  In the interim, we will send her for a dedicated course of physician directed physical therapy, Rx provided.  He may continue his current pain medication regiment as prescribed by his PCP.    Mechanical Low Back Pain:    Defined back pain as worsened with sitting and standing and nearly relieved with rest.  This can include referred pain radiating down posterior thighs without descent below the knees.     DDX:  spondylolisthesis with mechanical instability  compression fracture  degenerative facet arthropathy  coronal or sagittal spinal malalignment  failed back syndrome after surgery  flat back syndrome.   MUCH LESS LIKELY  myofascial pain  drug induced myalgias (statins, Neulasta, or phosphodiesterase type V inhibitors such as tadalafil).  Infectious discitis, vertebral osteomyelitis, spinal epidural abscess,   Neoplastic: spinal tumor (intradural or extradural), multiple myeloma, sacroiliitis  Degenerative spine: lumbar stenosis   Spondyloarthropathies including ankylosis spondylitis ([id number]), Paget's disease.   Fibromyalgia or other rheumatological disease. Malingering, conversion disorder and secondary gain are diagnoses of exclusion.    For further evaluation of his lower back pain we will send him x-rays of the lumbar spine complete with flexion extension to assess for areas of instability.  In conjunction with conservative management for his neck pain, I recommended physical therapy as a mode of conservative management for his lower back discomfort.  Again, we will have Fabian return for reassessment with Dr. Marroquin at his next available appointment.  I advised the patient to call to return sooner for new or worsening complaints of weakness, paresthesias, gait disturbances, or any additional concerns.  Treatment options discussed in detail with Fabian and he voiced understanding.  Mr. Velez agrees with this plan of care.    ADVANCED CARE PLANNING  Information on advance directives provided in AVS.    Diagnoses and all orders for this visit:    1. Cervicalgia (Primary)  -     MRI Cervical Spine Without Contrast; Future  -     XR Spine Cervical Complete With Flex Ext; Future  -     Ambulatory Referral to Physical Therapy Evaluate and treat (Please include cervical traction)    2. Mechanical low back pain  -     Ambulatory Referral to Physical Therapy Evaluate and treat (Please include cervical traction)  -     XR Spine Lumbar Complete With Flex & Ext; Future      Return for FOLLOW UP WITH DR. MARROQUIN NEXT AVAILABLE with MRI.    Thank you for this Consultation and the opportunity to participate in Fabian's care.    Sincerely,  Wilder Zendejas, TAI    Level of Risk: Moderate due to: undiagnosed new problem  MDM: Moderate  (Mod = 96937, High = 19309)

## 2021-10-27 NOTE — PATIENT INSTRUCTIONS
Tobacco Use Disorder  Tobacco use disorder (TUD) occurs when a person craves, seeks, and uses tobacco, regardless of the consequences. This disorder can cause problems with mental and physical health. It can affect your ability to have healthy relationships, and it can keep you from meeting your responsibilities at work, home, or school.  Tobacco may be:  · Smoked as a cigarette or cigar.  · Inhaled using e-cigarettes.  · Smoked in a pipe or hookah.  · Chewed as smokeless tobacco.  · Inhaled into the nostrils as snuff.  Tobacco products contain a dangerous chemical called nicotine, which is very addictive. Nicotine triggers hormones that make the body feel stimulated and works on areas of the brain that make you feel good. These effects can make it hard for people to quit nicotine.  Tobacco contains many other unsafe chemicals that can damage almost every organ in the body. Smoking tobacco also puts others in danger due to fire risk and possible health problems caused by breathing in secondhand smoke.  What are the signs or symptoms?  Symptoms of TUD may include:  · Being unable to slow down or stop your tobacco use.  · Spending an abnormal amount of time getting or using tobacco.  · Craving tobacco. Cravings may last for up to 6 months after quitting.  · Tobacco use that:  ? Interferes with your work, school, or home life.  ? Interferes with your personal and social relationships.  ? Makes you give up activities that you once enjoyed or found important.  · Using tobacco even though you know that it is:  ? Dangerous or bad for your health or someone else's health.  ? Causing problems in your life.  · Needing more and more of the substance to get the same effect (developing tolerance).  · Experiencing unpleasant symptoms if you do not use the substance (withdrawal). Withdrawal symptoms may include:  ? Depressed, anxious, or irritable mood.  ? Difficulty concentrating.  ? Increased appetite.  ? Restlessness or trouble  sleeping.  · Using the substance to avoid withdrawal.  How is this diagnosed?  This condition may be diagnosed based on:  · Your current and past tobacco use. Your health care provider may ask questions about how your tobacco use affects your life.  · A physical exam.  You may be diagnosed with TUD if you have at least two symptoms within a 12-month period.  How is this treated?  This condition is treated by stopping tobacco use. Many people are unable to quit on their own and need help. Treatment may include:  · Nicotine replacement therapy (NRT). NRT provides nicotine without the other harmful chemicals in tobacco. NRT gradually lowers the dosage of nicotine in the body and reduces withdrawal symptoms. NRT is available as:  ? Over-the-counter gums, lozenges, and skin patches.  ? Prescription mouth inhalers and nasal sprays.  · Medicine that acts on the brain to reduce cravings and withdrawal symptoms.  · A type of talk therapy that examines your triggers for tobacco use, how to avoid them, and how to cope with cravings (behavioral therapy).  · Hypnosis. This may help with withdrawal symptoms.  · Joining a support group for others coping with TUD.  The best treatment for TUD is usually a combination of medicine, talk therapy, and support groups. Recovery can be a long process. Many people start using tobacco again after stopping (relapse). If you relapse, it does not mean that treatment will not work.  Follow these instructions at home:    Lifestyle  · Do not use any products that contain nicotine or tobacco, such as cigarettes and e-cigarettes.  · Avoid things that trigger tobacco use as much as you can. Triggers include people and situations that usually cause you to use tobacco.  · Avoid drinks that contain caffeine, including coffee. These may worsen some withdrawal symptoms.  · Find ways to manage stress. Wanting to smoke may cause stress, and stress can make you want to smoke. Relaxation techniques such as  deep breathing, meditation, and yoga may help.  · Attend support groups as needed. These groups are an important part of long-term recovery for many people.  General instructions  · Take over-the-counter and prescription medicines only as told by your health care provider.  · Check with your health care provider before taking any new prescription or over-the-counter medicines.  · Decide on a friend, family member, or smoking quit-line (such as 1-800-QUIT-NOW in the [place] that you can call or text when you feel the urge to smoke or when you need help coping with cravings.  · Keep all follow-up visits as told by your health care provider and therapist. This is important.  Contact a health care provider if:  · You are not able to take your medicines as prescribed.  · Your symptoms get worse, even with treatment.  Summary  · Tobacco use disorder (TUD) occurs when a person craves, seeks, and uses tobacco regardless of the consequences.  · This condition may be diagnosed based on your current and past tobacco use and a physical exam.  · Many people are unable to quit on their own and need help. Recovery can be a long process.  · The most effective treatment for TUD is usually a combination of medicine, talk therapy, and support groups.  This information is not intended to replace advice given to you by your health care provider. Make sure you discuss any questions you have with your health care provider.  Document Revised: 12/05/2018 Document Reviewed: 12/05/2018  [employer] Patient Education © 2021 [employer]      Advance Care Planning and Advance Directives     You make decisions on a daily basis - decisions about where you want to live, your career, your home, your life. Perhaps one of the most important decisions you face is your choice for future medical care. Take time to talk with your family and your healthcare team and start planning today.  Advance Care Planning is a process that can help you:  · Understand  possible future healthcare decisions in light of your own experiences  · Reflect on those decision in light of your goals and values  · Discuss your decisions with those closest to you and the healthcare professionals that care for you  · Make a plan by creating a document that reflects your wishes    Surrogate Decision Maker  In the event of a medical emergency, which has left you unable to communicate or to make your own decisions, you would need someone to make decisions for you.  It is important to discuss your preferences for medical treatment with this person while you are in good health.     Qualities of a surrogate decision maker:  • Willing to take on this role and responsibility  • Knows what you want for future medical care  • Willing to follow your wishes even if they don't agree with them  • Able to make difficult medical decisions under stressful circumstances    Advance Directives  These are legal documents you can create that will guide your healthcare team and decision maker(s) when needed. These documents can be stored in the electronic medical record.    · Living Will - a legal document to guide your care if you have a terminal condition or a serious illness and are unable to communicate. States vary by statute in document names/types, but most forms may include one or more of the following:        -  Directions regarding life-prolonging treatments        -  Directions regarding artificially provided nutrition/hydration        -  Choosing a healthcare decision maker        -  Direction regarding organ/tissue donation    · Durable Power of  for Healthcare - this document names an -in-fact to make medical decisions for you, but it may also allow this person to make personal and financial decisions for you. Please seek the advice of an  if you need this type of document.    **Advance Directives are not required and no one may discriminate against you if you do not sign  one.    Medical Orders  Many states allow specific forms/orders signed by your physician to record your wishes for medical treatment in your current state of health. This form, signed in personal communication with your physician, addresses resuscitation and other medical interventions that you may or may not want.      For more information or to schedule a time with a [facility] Advance Care Planning Facilitator contact: [facility]/ACP or call [phone] and someone will contact you directly.

## 2021-11-23 ENCOUNTER — TELEPHONE (OUTPATIENT)
Dept: NEUROSURGERY | Facility: CLINIC | Age: 81
End: 2021-11-23

## 2021-11-23 NOTE — TELEPHONE ENCOUNTER
Notified pt of Ins denying MRI and that he needed physical therapy.    He has started PT and has done a few sessions  At [facility] in [place].   Apt has been changed to Wilder

## 2022-01-06 ENCOUNTER — TELEPHONE (OUTPATIENT)
Dept: NEUROSURGERY | Facility: CLINIC | Age: 82
End: 2022-01-06

## 2022-01-06 NOTE — TELEPHONE ENCOUNTER
Caller: Fabian Velez Sr.    Relationship to patient: Self    Best call back number: [phone]    Chief complaint: FOLLOW UP FROM MRI AND XRAY  MRI AND XRAY HAVE NOT BEEN DONE YET.    Type of visit: FOLLOW UP EST    Requested date:     If rescheduling, when is the original appointment: 1/4/2022 ( PT NO SHOWED, PT DID NOT KNOW ABOUT APPT.  LOST CONNECTIONS WHEN TORNADO CAME,  PROBLEM WITH PHONES. IS PT GOING TO BE CHARGED?    Additional notes:PT HAS 4 MORE VISIT OF PHYSICAL THERAPY.      PT STATED HE WAS TO HAVE MRI AND XRAY DONE AFTER PHY THERAPY HAS BEEN COMPLETED. RESCHEDULED MRI?    ALSO, DOES PT NEED TO BE SCHEDULED WITH KARYN DONAHUE OR DR. MARROQUIN?

## 2022-01-11 RX ORDER — DILTIAZEM HYDROCHLORIDE 240 MG/1
240 CAPSULE, COATED, EXTENDED RELEASE ORAL DAILY
Qty: 90 CAPSULE | Refills: 4 | Status: SHIPPED | OUTPATIENT
Start: 2022-01-11 | End: 2022-06-16 | Stop reason: SDUPTHER

## 2022-01-17 ENCOUNTER — TELEPHONE (OUTPATIENT)
Dept: NEUROSURGERY | Facility: CLINIC | Age: 82
End: 2022-01-17

## 2022-01-17 NOTE — TELEPHONE ENCOUNTER
Caller: Fabian Velez Sr.  Relationship: Self  Best call back number: [phone]    What was the call regarding: PATIENT CALLED TO CANCEL HIS APPOINTMENT FOR TOMORROW 1/18/22. PATIENT STATES HE IS NOT FEELING WELL, I BELIEVE HE STATED HE HAS A COLD.   ROUTING AS THIS CANCELLATION IS LESS THAN 3 DAYS OUT.   PATIENT STATED THAT HE WILL CALL BACK TO RESCHEDULE WHEN HE IS FEELING BETTER.

## 2022-03-09 ENCOUNTER — TELEPHONE (OUTPATIENT)
Dept: CARDIOLOGY | Facility: CLINIC | Age: 82
End: 2022-03-09

## 2022-03-09 NOTE — TELEPHONE ENCOUNTER
Patient states his diltiazem 240mg was filled through [facility] mail order pharmacy without him being consulted. He usually gets it filled at his local pharmacy, and now he will have to mail a check to [facility]. He is not comfortable receiving medicine through the mail. I apologized for the incident, and told him I will contact [facility] to discontinue the future refills. He voiced understanding.     I contacted Lina, pharmacist at [facility], and she will cancel the prescription for this patient. Jyotnsa Cabrera MA

## 2022-06-16 RX ORDER — DILTIAZEM HYDROCHLORIDE 240 MG/1
240 CAPSULE, COATED, EXTENDED RELEASE ORAL DAILY
Qty: 90 CAPSULE | Refills: 3 | Status: SHIPPED | OUTPATIENT
Start: 2022-06-16

## 2022-06-16 NOTE — TELEPHONE ENCOUNTER
Please refill as a 90 day supply thru [facility], per patient request. Thank you. Jyotsna Cabrera MA

## 2022-07-07 ENCOUNTER — TELEPHONE (OUTPATIENT)
Dept: CARDIOLOGY | Facility: CLINIC | Age: 82
End: 2022-07-07

## 2022-07-07 NOTE — TELEPHONE ENCOUNTER
Patient's most recent office visit note addressing this is faxed to [facility]. Jyotsna Cabrera MA

## 2022-07-07 NOTE — TELEPHONE ENCOUNTER
Caller: WADE NADIA     Relationship: SELF    Best call back number: [phone]    What form or medical record are you requesting: [facility]    Who is requesting this form or medical record from you: CLEARANCE FOR STOPPING ELIQUIS FOR INJECTIONS     How would you like to receive the form or medical records (pick-up, mail, fax): FAX  If fax, what is the fax number: [phone]      Timeframe paperwork needed: ASAP    Additional notes:  PT IS UNABLE TO RECEIVE INJECTIONS FROM THE [facility] OFFICE UNTIL THEY GET A CLEARANCE SAYING THAT THE PT CAN STOP HIS ELIQUIS 3-4 DAYS BEFORE RECEIVING INJECTIONS. PLEASE CALL PT IF YOU NEED ANYTHING ELSE. PT IS SUPPOSED TO START INJECTIONS ON THE 18th.

## 2022-07-08 NOTE — TELEPHONE ENCOUNTER
Jeremie Lala MD  You 5 hours ago (9:44 AM)     MR    Thanks - if they won'd do it with him taking asa, then I think it would be ok for him to just be off the eliquis without the aspirin (he has no history of coronary stents or anything else that I can see that would make asa, when off eliquis, a requirement)     Thanks!    Notified [facility] of this. They will notify patient. Jyotsna Cabrera MA

## 2022-08-04 NOTE — PROGRESS NOTES
Subjective    Mr. Velez is 81 y.o. male    Chief Complaint: Yearly follow up Elevated PSA    History of Present Illness  Benign Prostatic Hypertrophy  Patient complains of lower urinary tract symptoms. He reports incomplete emptying, intermittency, nocturia two times a night, straining and urgency. He denies urgency and weak stream. Patient states symptoms are of moderate severity. Onset of symptoms was a few years ago and was gradual in onset. His AUA Symptom Score is, 10/35.He reports a history of no complicating symptoms. He denies flank pain, gross hematuria, kidney stones and recurrent UTI.  Patient has tried Alpha blockers with improvement. Last PSA was 1.7 done 12/16/2019..        The following portions of the patient's history were reviewed and updated as appropriate: allergies, current medications, past family history, past medical history, past social history, past surgical history and problem list.    Review of Systems   Genitourinary: Positive for frequency. Negative for dysuria, hematuria and urgency.   All other systems reviewed and are negative.        Current Outpatient Medications:   •  acetaminophen (TYLENOL) 500 MG tablet, Take 1,000 mg by mouth Every 6 (Six) Hours As Needed for Mild Pain ., Disp: , Rfl:   •  apixaban (ELIQUIS) 5 MG tablet tablet, Take 1 tablet by mouth Every 12 (Twelve) Hours., Disp: 180 tablet, Rfl: 3  •  baclofen (LIORESAL) 10 MG tablet, Take 10 mg by mouth Daily., Disp: , Rfl:   •  carvedilol (COREG) 3.125 MG tablet, Take 1 tablet by mouth 2 (Two) Times a Day., Disp: 180 tablet, Rfl: 3  •  Cholecalciferol (VITAMIN D3) 125 MCG (5000 UT) capsule capsule, Take 5,000 Units by mouth Daily., Disp: , Rfl:   •  dilTIAZem CD (CARDIZEM CD) 240 MG 24 hr capsule, Take 1 capsule by mouth Daily., Disp: 90 capsule, Rfl: 3  •  fenofibrate 160 MG tablet, Take 160 mg by mouth Daily., Disp: , Rfl:   •  omeprazole (priLOSEC) 20 MG capsule, TAKE 1 CAPSULE BY MOUTH EVERY DAY, Disp: 90 capsule,  "Rfl: 0  •  simvastatin (ZOCOR) 10 MG tablet, Take 10 mg by mouth Every Night., Disp: , Rfl:   •  tamsulosin (FLOMAX) 0.4 MG capsule 24 hr capsule, Take 1 capsule by mouth Daily., Disp: 90 capsule, Rfl: 3  •  traMADol (ULTRAM) 50 MG tablet, Take 1 tablet by mouth 3 (Three) Times a Day., Disp: , Rfl:     Past Medical History:   Diagnosis Date   • Arthritis    • Atrial fibrillation (HCC)    • Diverticulosis    • GERD (gastroesophageal reflux disease)    • Hyperlipidemia    • Hypertension    • Plantar fasciitis    • Shingles    • Skin cancer    • Urticaria        Past Surgical History:   Procedure Laterality Date   • COLONOSCOPY  2016    5yr colon recall based on prep adequate to identify polyps greater than 6 mm   • ENDOSCOPY  2011   • INGUINAL HERNIA REPAIR     • REPLACEMENT TOTAL KNEE     • SHOULDER SURGERY Bilateral        Social History     Socioeconomic History   • Marital status:    Tobacco Use   • Smoking status: Former Smoker     Quit date:      Years since quittin.6   • Smokeless tobacco: Former User     Types: Chew   Vaping Use   • Vaping Use: Never used   Substance and Sexual Activity   • Alcohol use: Yes     Alcohol/week: 4.0 - 5.0 standard drinks     Types: 4 - 5 Cans of beer per week   • Drug use: No   • Sexual activity: Defer       Family History   Problem Relation Age of Onset   • Crohn's disease Son    • Heart disease Father    • Cancer Mother    • Stroke Brother    • Colon cancer Neg Hx    • Colon polyps Neg Hx        Objective    Temp 97.3 °F (36.3 °C)   Ht 182.9 cm (72\")   Wt 76 kg (167 lb 9.6 oz)   BMI 22.73 kg/m²     Physical Exam  Vitals reviewed.   Constitutional:       Appearance: Normal appearance.   HENT:      Head: Normocephalic and atraumatic.      Nose: No congestion.   Pulmonary:      Effort: Pulmonary effort is normal.   Genitourinary:     Comments: Digital rectal exam revealed a smooth symmetric prostate was enlarged but it is benign no suspicious lesions " nodules asymmetry or firmness were palpated.  Skin:     General: Skin is warm and dry.   Neurological:      Mental Status: He is alert and oriented to person, place, and time.   Psychiatric:         Behavior: Behavior normal.             Results for orders placed or performed in visit on 08/12/22   POC Urinalysis Dipstick, Multipro    Specimen: Urine   Result Value Ref Range    Color Yellow Yellow, Straw, Dark Yellow, Tamara    Clarity, UA Clear Clear    Glucose, UA Negative Negative mg/dL    Bilirubin Negative Negative    Ketones, UA Trace (A) Negative    Specific Gravity  1.015 1.005 - 1.030    Blood, UA Trace (A) Negative    pH, Urine 5.5 5.0 - 8.0    Protein,  mg/dL (A) Negative mg/dL    Urobilinogen, UA Normal Normal    Nitrite, UA Negative Negative    Leukocytes Negative Negative   IPSS Questionnaire (AUA-7):  Incomplete emptying  Over the past month, how often have you had a sensation of not emptying your bladder completely after you finish?: Less than half the time (08/12/22 0855)  Frequency  Over the past month, how often have you had to urinate again less than two hours after you finishing urinating ?: About half the time (08/12/22 0855)  Intermittency  Over the past month, how often have you found you stopped and started again several time when you urinated ?: Less than half the time (08/12/22 0855)  Urgency  Over the last month, how difficult  have you found it to postpone urination ?: Not at all (08/12/22 0855)  Weak Stream  Over the past month, how often have you had a weak urinary stream ?: Not at all (08/12/22 0855)  Straining  Over the past month, how often have you had to push or strain to begin urination ?: Less than 1 time 5 (08/12/22 0855)  Nocturia  Over the past month, how many times did you most typically get up to urinate from the time you went to bed until the time you got up in the morning ?: Less than half the time (08/12/22 0855)  Quality of life due to urinary symptoms  If you  were to spend the rest of your life with your urinary condition the way it is now, how would feel about that?: Pleased (08/12/22 0855)    Scores  Total IPSS Score: 10 (08/12/22 0855)  Total Score = Symtomatic Level: moderately symptomatic: 8-19 (08/12/22 0855)       Assessment and Plan    Diagnoses and all orders for this visit:    1. Urine frequency (Primary)  -     POC Urinalysis Dipstick, Multipro    2. BPH with obstruction/lower urinary tract symptoms    3. Proteinuria, unspecified type    Patient had having no new urinary complaints or problems.  He continues to do well on tamsulosin AUA score was 10 out of 35 digital rectal exam was benign he no longer gets PSAs due to his low PSA previously and age per AUA guidelines.    He did have some proteinuria today he states she does not really drink enough water told him to increase water intake I was going to recheck his urine in 1 month here but he does have an upcoming appointment either next month of the month after with Dr. Soria I told him to make sure he gets a urine checked just to make sure the proteinuria is not worse or persisting.

## 2022-08-09 ENCOUNTER — APPOINTMENT (OUTPATIENT)
Dept: ULTRASOUND IMAGING | Facility: HOSPITAL | Age: 82
End: 2022-08-09

## 2022-08-11 DIAGNOSIS — K21.9 GASTROESOPHAGEAL REFLUX DISEASE: ICD-10-CM

## 2022-08-11 RX ORDER — OMEPRAZOLE 20 MG/1
CAPSULE, DELAYED RELEASE ORAL
Qty: 90 CAPSULE | Refills: 0 | Status: SHIPPED | OUTPATIENT
Start: 2022-08-11 | End: 2023-01-03 | Stop reason: SDUPTHER

## 2022-08-12 ENCOUNTER — OFFICE VISIT (OUTPATIENT)
Dept: UROLOGY | Facility: CLINIC | Age: 82
End: 2022-08-12

## 2022-08-12 VITALS — BODY MASS INDEX: 22.7 KG/M2 | HEIGHT: 72 IN | WEIGHT: 167.6 LBS | TEMPERATURE: 97.3 F

## 2022-08-12 DIAGNOSIS — N40.1 BPH WITH OBSTRUCTION/LOWER URINARY TRACT SYMPTOMS: ICD-10-CM

## 2022-08-12 DIAGNOSIS — R35.0 URINE FREQUENCY: Primary | ICD-10-CM

## 2022-08-12 DIAGNOSIS — R80.9 PROTEINURIA, UNSPECIFIED TYPE: ICD-10-CM

## 2022-08-12 DIAGNOSIS — N13.8 BPH WITH OBSTRUCTION/LOWER URINARY TRACT SYMPTOMS: ICD-10-CM

## 2022-08-12 LAB
BILIRUB BLD-MCNC: NEGATIVE MG/DL
CLARITY, POC: CLEAR
COLOR UR: YELLOW
GLUCOSE UR STRIP-MCNC: NEGATIVE MG/DL
KETONES UR QL: ABNORMAL
LEUKOCYTE EST, POC: NEGATIVE
NITRITE UR-MCNC: NEGATIVE MG/ML
PH UR: 5.5 [PH] (ref 5–8)
PROT UR STRIP-MCNC: ABNORMAL MG/DL
RBC # UR STRIP: ABNORMAL /UL
SP GR UR: 1.01 (ref 1–1.03)
UROBILINOGEN UR QL: NORMAL

## 2022-08-12 PROCEDURE — 81001 URINALYSIS AUTO W/SCOPE: CPT | Performed by: PHYSICIAN ASSISTANT

## 2022-08-12 PROCEDURE — 99214 OFFICE O/P EST MOD 30 MIN: CPT | Performed by: PHYSICIAN ASSISTANT

## 2022-09-26 ENCOUNTER — TELEPHONE (OUTPATIENT)
Dept: VASCULAR SURGERY | Facility: CLINIC | Age: 82
End: 2022-09-26

## 2022-09-27 ENCOUNTER — TELEPHONE (OUTPATIENT)
Dept: VASCULAR SURGERY | Facility: CLINIC | Age: 82
End: 2022-09-27

## 2022-09-27 NOTE — TELEPHONE ENCOUNTER
Pt returned call and states he does not want to reschedule carotid US and F/U with Dr Ba.  Pt feels like he doesn't need an appt at this time.

## 2023-01-03 ENCOUNTER — OFFICE VISIT (OUTPATIENT)
Dept: GASTROENTEROLOGY | Facility: CLINIC | Age: 83
End: 2023-01-03
Payer: MEDICARE

## 2023-01-03 VITALS
HEIGHT: 72 IN | TEMPERATURE: 96 F | SYSTOLIC BLOOD PRESSURE: 140 MMHG | DIASTOLIC BLOOD PRESSURE: 82 MMHG | OXYGEN SATURATION: 97 % | WEIGHT: 172 LBS | BODY MASS INDEX: 23.3 KG/M2 | HEART RATE: 71 BPM

## 2023-01-03 DIAGNOSIS — K21.9 GASTROESOPHAGEAL REFLUX DISEASE WITHOUT ESOPHAGITIS: Primary | ICD-10-CM

## 2023-01-03 DIAGNOSIS — K21.9 GASTROESOPHAGEAL REFLUX DISEASE: ICD-10-CM

## 2023-01-03 PROCEDURE — 1160F RVW MEDS BY RX/DR IN RCRD: CPT | Performed by: INTERNAL MEDICINE

## 2023-01-03 PROCEDURE — 99213 OFFICE O/P EST LOW 20 MIN: CPT | Performed by: INTERNAL MEDICINE

## 2023-01-03 PROCEDURE — 1159F MED LIST DOCD IN RCRD: CPT | Performed by: INTERNAL MEDICINE

## 2023-01-03 RX ORDER — OMEPRAZOLE 20 MG/1
20 CAPSULE, DELAYED RELEASE ORAL DAILY
Qty: 90 CAPSULE | Refills: 3 | Status: SHIPPED | OUTPATIENT
Start: 2023-01-03

## 2023-01-03 NOTE — PROGRESS NOTES
[facility]    Chief Complaint   Patient presents with   • Heartburn       Subjective     HPI    Fabian Veelz Sr. is a 82 y.o. male who presents with a chief complaint of reflux disease    When he was here in August 2021 he was doing well from a GI standpoint.  His reflux disease was under control.  We have talked about pursuing screening colonoscopies the pros and cons.  He declined set up any future colonoscopy which was reasonable given his advanced age and health.    He comes in today stating that he is doing quite well.  He continues on omeprazole.  He is wondering if he can get this over-the-counter and I informed him yes.  He currently gets a 90-day prescription.  He states he will check with his wife to see which way he gets it cheaper.  But he is really not have any heartburn.  He does miss a dose of the medication he wants well does not really notice being off of it.  No dysphagia.  No abdominal pains.    ============== copy of August 2021 HPI====================     HPI     Fabian Velez Sr. is a 80 y.o. male who presents with a chief complaint of heartburn.     He is here with his wife for his annual visit.  He states he is doing well from a GI standpoint.  Denies any symptoms.  He states the omeprazole has kept his heartburn under control since he has been on it.  Denies dysphagia.  Denies any lower intestinal symptoms.  Not having constipation or diarrhea.  No blood in his stools.  What is going on with his health is that he has skin cancer on his left cheek that he has to get resected later this week.  He states earlier today he had to have a steroid injection in his right knee.  He had his knee replaced a year and a half ago but he still having trouble with it.           ================ June 2020 HPI================================================  HPI     Fabian Velez Sr. is a 80 y.o. male who presents with a chief complaint of heartburn.     He comes in for an annual  checkup.  He states his heartburn is under control.  He takes omeprazole 20 mg that he takes in the evening.  He is not really having breakthrough symptoms.  We previously talked about him trying to wean off of it but he does not remember ever trying.  He states he always takes it.  Denies dysphasia.  Denies any dyspepsia.  He states everything is going well for him.       He does have atrial fibrillation which is a new medical problem for him.  He states he is on Eliquis now.  That started last winter.    Past Medical History:   Diagnosis Date   • Arthritis    • Atrial fibrillation (HCC)    • Diverticulosis    • GERD (gastroesophageal reflux disease)    • Hyperlipidemia    • Hypertension    • Plantar fasciitis    • Shingles    • Skin cancer    • Urticaria        Past Surgical History:   Procedure Laterality Date   • COLONOSCOPY  06/02/2016    5yr colon recall based on prep adequate to identify polyps greater than 6 mm   • ENDOSCOPY  06/14/2011   • INGUINAL HERNIA REPAIR     • REPLACEMENT TOTAL KNEE     • SHOULDER SURGERY Bilateral          Current Outpatient Medications:   •  apixaban (ELIQUIS) 5 MG tablet tablet, Take 1 tablet by mouth Every 12 (Twelve) Hours., Disp: 180 tablet, Rfl: 3  •  baclofen (LIORESAL) 10 MG tablet, Take 10 mg by mouth Daily., Disp: , Rfl:   •  carvedilol (COREG) 3.125 MG tablet, Take 1 tablet by mouth 2 (Two) Times a Day., Disp: 180 tablet, Rfl: 3  •  Cholecalciferol (VITAMIN D3) 125 MCG (5000 UT) capsule capsule, Take 5,000 Units by mouth Daily., Disp: , Rfl:   •  dilTIAZem CD (CARDIZEM CD) 240 MG 24 hr capsule, Take 1 capsule by mouth Daily., Disp: 90 capsule, Rfl: 3  •  fenofibrate 160 MG tablet, Take 160 mg by mouth Daily., Disp: , Rfl:   •  omeprazole (priLOSEC) 20 MG capsule, Take 1 capsule by mouth Daily., Disp: 90 capsule, Rfl: 3  •  simvastatin (ZOCOR) 10 MG tablet, Take 10 mg by mouth Every Night., Disp: , Rfl:   •  tamsulosin (FLOMAX) 0.4 MG capsule 24 hr capsule, Take 1 capsule  by mouth Daily., Disp: 90 capsule, Rfl: 3  •  traMADol (ULTRAM) 50 MG tablet, Take 1 tablet by mouth 3 (Three) Times a Day., Disp: , Rfl:   •  acetaminophen (TYLENOL) 500 MG tablet, Take 1,000 mg by mouth Every 6 (Six) Hours As Needed for Mild Pain ., Disp: , Rfl:     Allergies   Allergen Reactions   • Contrast Dye Other (See Comments)     flush       Social History     Socioeconomic History   • Marital status:    Tobacco Use   • Smoking status: Former   • Smokeless tobacco: Former     Types: Chew   Vaping Use   • Vaping Use: Never used   Substance and Sexual Activity   • Alcohol use: Yes     Alcohol/week: 4.0 - 5.0 standard drinks     Types: 4 - 5 Cans of beer per week     Comment: occ   • Drug use: No   • Sexual activity: Defer       Family History   Problem Relation Age of Onset   • Crohn's disease Son    • Heart disease Father    • Cancer Mother    • Stroke Brother    • Colon cancer Neg Hx    • Colon polyps Neg Hx        Review of Systems  Denies any change in bowel habits, no blood in his stools.  He did tell me that he got a Cologuard box in the mail and he chose not to do that test as well    Objective     Vitals:    01/03/23 1400   BP: 140/82   Pulse: 71   Temp: 96 °F (35.6 °C)   SpO2: 97%       Physical Exam  No acute distress. Vital signs as documented.  Sclera anicteric.  Neck without noticeable JVD. Lungs clear to auscultation. Heart exam notable for regular rhythm, normal sounds. Abdomen is soft, nontender, non distended, normal bowel sounds and without evidence of organomegaly, masses.  Neuro alert, moves extremities.        Assessment & Plan   Problem List Items Addressed This Visit        Gastrointestinal Abdominal     Gastroesophageal reflux disease - Primary    Relevant Medications    omeprazole (priLOSEC) 20 MG capsule         Clinically is asymptomatic.  I reinforced reflux precautions.  I told him he can try to wean off the omeprazole.  Just go to an as-needed basis.  I also informed him  that he can buy the omeprazole over-the-counter without a prescription.  He is going to look to see if he gets a cheaper over-the-counter or by prescription.  I did refill his 90-day supply per his request.  He will work on the above reflux precautions.  If he starts having troubles he is going to come see me otherwise we will see him back in the office as needed.    Continue ongoing management by primary care provider and other specialists.     Body mass index is 23.33 kg/m².  Normal BMI      EMR Dragon/transcription disclaimer:  Much of this encounter note is electronic transcription/translation of spoken language to printed text.  The electronic translation of spoken language may be erroneous, or at times, nonsensical words or phrases may be inadvertently transcribed.  Although I have reviewed the note for such errors, some may still exist.    Antonio Salgado MD  15:50 CST  01/03/23

## 2023-02-14 ENCOUNTER — OFFICE VISIT (OUTPATIENT)
Dept: CARDIOLOGY | Facility: CLINIC | Age: 83
End: 2023-02-14
Payer: MEDICARE

## 2023-02-14 VITALS
WEIGHT: 171.6 LBS | SYSTOLIC BLOOD PRESSURE: 98 MMHG | HEIGHT: 72 IN | BODY MASS INDEX: 23.24 KG/M2 | DIASTOLIC BLOOD PRESSURE: 62 MMHG | OXYGEN SATURATION: 96 % | HEART RATE: 55 BPM

## 2023-02-14 DIAGNOSIS — E78.2 MIXED HYPERLIPIDEMIA: ICD-10-CM

## 2023-02-14 DIAGNOSIS — I48.0 PAROXYSMAL ATRIAL FIBRILLATION: Primary | ICD-10-CM

## 2023-02-14 DIAGNOSIS — I47.1 PSVT (PAROXYSMAL SUPRAVENTRICULAR TACHYCARDIA): ICD-10-CM

## 2023-02-14 DIAGNOSIS — I10 PRIMARY HYPERTENSION: ICD-10-CM

## 2023-02-14 PROCEDURE — 99214 OFFICE O/P EST MOD 30 MIN: CPT | Performed by: NURSE PRACTITIONER

## 2023-02-14 PROCEDURE — 93000 ELECTROCARDIOGRAM COMPLETE: CPT | Performed by: NURSE PRACTITIONER

## 2023-02-14 RX ORDER — METOPROLOL TARTRATE 75 MG/1
75 TABLET, FILM COATED ORAL 2 TIMES DAILY
COMMUNITY

## 2023-02-14 NOTE — PROGRESS NOTES
Subjective:     Encounter Date: 02/14/23      Patient ID: Fabian Velez Sr. is a 82 y.o. male.    Chief Complaint: Follow-up PAF, SVT, hypertension    History of Present Illness      82-year-old returns for follow-up of paroxysmal atrial fibrillation.  By way of review, Dr. Lala first saw him in '20 for new/recent diagnosis of paroxysmal atrial fibrillation.  He was in [facility] with a family member when he started noticing a funny feeling that he described as lightheadedness.  He was noted to be in atrial fibrillation, with rapid ventricular response and after a brief overnight admission there he was discharged home on Eliquis and diltiazem.  He also has limiting back pain that he was needing a series of back injections for, with advice regarding anticoagulation around those.  He wore a 14-day Zio patch in May 2020 to see if he was having any other occult atrial fibrillation, and he was not.  We recommended that he remain on Eliquis, but to take aspirin 81 mg daily if Eliquis had to be held for his injections.  He did well through the next couple follow-up visits and then when he saw me in the office in 2021, he noted some palpitations described as episodes lasting 10 to 15 minutes at a time usually twice per month but had some episodes lasting up to 4 to 5 hours.  A Zio patch was placed which showed PACs and short runs of SVT, but no atrial fibrillation.  His Lopressor dose was increased.    I followed up with him again in April 2021 and he felt better after increasing metoprolol but still had some mild fluttering sensations.  He reported chronic stable exertional dyspnea related to back pain and deconditioning.  He was not having chest pain, orthopnea, PND.  No changes were made    He saw Dr. Lala in October 2021 and denied palpitations.  He admitted some exertional dyspnea in the afternoon.  He had to ambulate slowly due to his knee.  He did not have any chest discomfort.   Blood pressure had been running high and therefore Dr. Lala replaced Addy with Coreg.  No other changes were made    Today the patient presents for follow-up and states he feels well overall.  He denies palpitations, orthopnea, PND, edema.  He reports chronic stable exertional dyspnea that is not worsening.  His only chest discomfort is a right-sided chest wall pain that is worsened by turning a certain way and deep breathing.  He believes he injured his ribs when he twisted the wrong way when he was working on his tractor.  Blood pressure now appears to be well controlled, but he does not have an accurate medication list with him today.  His list indicates he is taking both Lopressor and carvedilol.        The following portions of the patient's history were reviewed and updated as appropriate: allergies, current medications, past family history, past medical history, past social history, past surgical history and problem list.    Review of Systems   Constitutional: Negative for malaise/fatigue.   Cardiovascular: Positive for chest pain (non cardiac ) and dyspnea on exertion. Negative for claudication, leg swelling, near-syncope, orthopnea, palpitations, paroxysmal nocturnal dyspnea and syncope.   Respiratory: Negative for cough.    Hematologic/Lymphatic: Does not bruise/bleed easily.   Musculoskeletal: Negative for falls.   Gastrointestinal: Negative for bloating.   Neurological: Negative for dizziness, light-headedness and weakness.       Allergies   Allergen Reactions   • Contrast Dye (Echo Or Unknown Ct/Mr) Other (See Comments)     flush       Current Outpatient Medications:   •  acetaminophen (TYLENOL) 500 MG tablet, Take 1,000 mg by mouth Every 6 (Six) Hours As Needed for Mild Pain ., Disp: , Rfl:   •  apixaban (ELIQUIS) 5 MG tablet tablet, Take 1 tablet by mouth Every 12 (Twelve) Hours., Disp: 180 tablet, Rfl: 3  •  baclofen (LIORESAL) 10 MG tablet, Take 10 mg by mouth Daily., Disp: , Rfl:   •   "carvedilol (COREG) 3.125 MG tablet, Take 1 tablet by mouth 2 (Two) Times a Day., Disp: 180 tablet, Rfl: 3  •  Cholecalciferol (VITAMIN D3) 125 MCG (5000 UT) capsule capsule, Take 5,000 Units by mouth Daily., Disp: , Rfl:   •  dilTIAZem CD (CARDIZEM CD) 240 MG 24 hr capsule, Take 1 capsule by mouth Daily., Disp: 90 capsule, Rfl: 3  •  fenofibrate 160 MG tablet, Take 160 mg by mouth Daily., Disp: , Rfl:   •  Metoprolol Tartrate 75 MG tablet, Take 75 mg by mouth Daily., Disp: , Rfl:   •  omeprazole (priLOSEC) 20 MG capsule, Take 1 capsule by mouth Daily., Disp: 90 capsule, Rfl: 3  •  simvastatin (ZOCOR) 10 MG tablet, Take 10 mg by mouth Every Night., Disp: , Rfl:   •  tamsulosin (FLOMAX) 0.4 MG capsule 24 hr capsule, Take 1 capsule by mouth Daily., Disp: 90 capsule, Rfl: 3  •  traMADol (ULTRAM) 50 MG tablet, Take 1 tablet by mouth 1 (One) Time., Disp: , Rfl:          Objective:    BP 98/62   Pulse 55   Ht 182.9 cm (72\")   Wt 77.8 kg (171 lb 9.6 oz)   SpO2 96%   BMI 23.27 kg/m²        Vitals and nursing note reviewed.   Constitutional:       General: Not in acute distress.     Appearance: Not in distress.   Neck:      Vascular: No JVD or JVR. JVD normal.   Pulmonary:      Effort: Pulmonary effort is normal.      Breath sounds: Normal breath sounds.   Cardiovascular:      Normal rate. Regular rhythm.      Murmurs: There is no murmur.   Edema:     Peripheral edema absent.   Skin:     General: Skin is warm and dry.   Neurological:      Mental Status: Alert, oriented to person, place, and time and oriented to person, place and time.         Lab Review:   No results found for: GLUCOSE, BUN, CREATININE, EGFRRESULT, EGFR, BCR, K, CO2, CALCIUM, PROTENTOTREF, ALBUMIN, LABIL2, BILIRUBIN, AST, ALT          ECG 12 Lead    Date/Time: 2/14/2023 5:01 PM  Performed by: Jenny Sun APRN  Authorized by: Jenny Sun APRN   Comparison: compared with previous ECG from 10/22/2021  Similar to previous ECG  Rhythm: sinus " rhythm  Ectopy: atrial premature contractions  BPM: 64                Echo report previously reviewed:  1/21/2020.  LVEF 68% with mild LVH.  Left and right atria both said to be normal in size.  Normal size and function of the RV.  No significant valvular pathology noted.    5/2020 14 day monitor -   · A relatively benign monitor study.  · Predominant rhythm is normal sinus rhythm.  · Numerous runs of short, nonsustained supraventricular tachycardia.  · Two patient reported episodes of symptoms, including lightheadedness, and palpitations, corresponded with frequent bursts of short, nonsustained supraventricular tachycardia.  · Occasional (3.4%) isolated PACs.    2/2021 14 day monitor :    · An abnormal monitor study.  · Predominant rhythm was normal sinus rhythm.  · Patient reported symptoms on 5 occasions. When palpitations were reported, it seemed to correlate with PACs and/or short runs of SVT. When chest pain was reported alone (i.e. not with palpitations), it seemed to only be at times of normal sinus rhythm.  · No sustained arrhythmias, but there were numerous short episodes of SVT (longest was 18 seconds).  · No evidence of atrial fibrillation or flutter.  · Occasional (2.6%) PACs.      Assessment:      Problem List Items Addressed This Visit        Cardiac and Vasculature    Atrial fibrillation (HCC) - Primary    Overview     Diagnosed Jan '20 - was symptomatic at time of diagnosis with AF/RVR (rate 150s) - was light-headed    CHADS-VASc Risk Assessment            3       Total Score        1 Hypertension    2 Age >/= 75        Criteria that do not apply:    CHF    DM    PRIOR STROKE/TIA/THROMBO    Vascular Disease    Age 65-74    Sex: Female                 Relevant Medications    Metoprolol Tartrate 75 MG tablet    Hypertension    Relevant Medications    Metoprolol Tartrate 75 MG tablet    Hyperlipidemia    PSVT (paroxysmal supraventricular tachycardia) (HCC)    Relevant Medications    Metoprolol Tartrate  75 MG tablet       Plan:     1.  Paroxysmal atrial fibrillation: Established problem, stable.  Maintaining normal sinus rhythm  -Continue Eliquis 5 mg twice daily for anticoagulation (replace this with aspirin 81 mg daily when receiving back injections, then resume Eliquis and DC aspirin post procedure when safe from a bleeding standpoint)  -Continue 1 beta-blocker  and diltiazem 240 mg daily for rate control with recurrences of atrial fibrillation     2.  Essential hypertension: Now well controlled.  -Lopressor was changed to carvedilol at his visit in October 2021.  Both medications are on his list today and he is unsure what he is taking.  We will obtain an accurate medication reconciliation prior to making further recommendations.  I did instruct him that these were both beta-blockers and he should be taking 1 and not both.  -continue current dose cardizem  -We discussed goal blood pressure (average <140/<90)     3.  Hyperlipidemia: no recent labs available  -Continue statin-management per PCP    4.  PSVT: stable; as previously corresponded with symptomatic complaints on different outpatient rhythm monitoring studies.    -Continue BB and calcium channel blocker for suppression    F/u 1 year, or sooner with any new or worsening symptoms    I spent 33 minutes caring for Fabian on this date of service. This time includes time spent by me in the following activities: preparing for the visit, reviewing tests, obtaining and/or reviewing a separately obtained history, performing a medically appropriate examination and/or evaluation, counseling and educating the patient/family/caregiver and referring and communicating with other health care professionals    Jenny Sun, APRN  17:01 CST  02/14/23

## 2023-02-16 ENCOUNTER — TELEPHONE (OUTPATIENT)
Dept: CARDIOLOGY | Facility: CLINIC | Age: 83
End: 2023-02-16
Payer: MEDICARE

## 2023-02-16 RX ORDER — GEMFIBROZIL 600 MG/1
600 TABLET, FILM COATED ORAL
COMMUNITY

## 2023-02-16 NOTE — TELEPHONE ENCOUNTER
----- Message from TAI Coates sent at 2/16/2023  1:00 PM CST -----  Thank you. Please save these notes to chart. No further changes at this time. Continue the metoprolol and remain off of carvedilol.    ----- Message -----  From: Po Hernandez MA  Sent: 2/16/2023  12:46 PM CST  To: TAI Coates    I contacted the patient's wife to go over medications.  The patient is taking metoprolol 75 mg BID and not the carvedilol.  I contacted both pharmacies and neither has ever filled the carvedilol.  The only other addition was gemfibrozil 600 mg BID.  All other medications were the same.  Thank you.    ----- Message -----  From: Jenny Sun APRN  Sent: 2/14/2023   5:13 PM CST  To: Po Hernandez MA    Please call the patient's 2 pharmacies and the patient's wife (she organizes his pills) and obtain an accurate medication reconciliation.  He should not be on metoprolol and carvedilol both-after we find out exactly what he is taking let me know and I will make further recommendations.  Thank you.

## 2023-02-23 ENCOUNTER — TELEPHONE (OUTPATIENT)
Dept: VASCULAR SURGERY | Facility: CLINIC | Age: 83
End: 2023-02-23
Payer: MEDICARE

## 2023-02-23 NOTE — TELEPHONE ENCOUNTER
Left message for a return call to see if pt would like to get his cancelled test and follow up rescheduled.

## 2023-04-27 DIAGNOSIS — I65.23 BILATERAL CAROTID ARTERY STENOSIS: Primary | ICD-10-CM

## 2023-05-22 RX ORDER — DILTIAZEM HYDROCHLORIDE 240 MG/1
CAPSULE, COATED, EXTENDED RELEASE ORAL
Qty: 90 CAPSULE | Refills: 3 | Status: SHIPPED | OUTPATIENT
Start: 2023-05-22

## 2023-10-18 ENCOUNTER — HOSPITAL ENCOUNTER (OUTPATIENT)
Facility: HOSPITAL | Age: 83
Setting detail: OBSERVATION
Discharge: HOME | End: 2023-10-19
Attending: FAMILY MEDICINE | Admitting: STUDENT IN AN ORGANIZED HEALTH CARE EDUCATION/TRAINING PROGRAM
Payer: MEDICARE

## 2023-10-18 ENCOUNTER — APPOINTMENT (OUTPATIENT)
Dept: GENERAL RADIOLOGY | Facility: HOSPITAL | Age: 83
End: 2023-10-18
Payer: MEDICARE

## 2023-10-18 DIAGNOSIS — R79.89 ELEVATED TROPONIN: ICD-10-CM

## 2023-10-18 DIAGNOSIS — I20.0 UNSTABLE ANGINA: Primary | ICD-10-CM

## 2023-10-18 DIAGNOSIS — N18.1 CHRONIC RENAL INSUFFICIENCY, STAGE 1: ICD-10-CM

## 2023-10-18 LAB
ALBUMIN SERPL-MCNC: 4.3 G/DL (ref 3.5–5.2)
ALBUMIN/GLOB SERPL: 1.4 G/DL
ALP SERPL-CCNC: 144 U/L (ref 39–117)
ALT SERPL W P-5'-P-CCNC: 9 U/L (ref 1–41)
ANION GAP SERPL CALCULATED.3IONS-SCNC: 9 MMOL/L (ref 5–15)
APTT PPP: 33.7 SECONDS (ref 24.5–36)
AST SERPL-CCNC: 20 U/L (ref 1–40)
BASOPHILS # BLD AUTO: 0.04 10*3/MM3 (ref 0–0.2)
BASOPHILS NFR BLD AUTO: 0.5 % (ref 0–1.5)
BILIRUB SERPL-MCNC: 0.3 MG/DL (ref 0–1.2)
BUN SERPL-MCNC: 19 MG/DL (ref 8–23)
BUN/CREAT SERPL: 12.5 (ref 7–25)
CALCIUM SPEC-SCNC: 9.3 MG/DL (ref 8.6–10.5)
CHLORIDE SERPL-SCNC: 104 MMOL/L (ref 98–107)
CO2 SERPL-SCNC: 26 MMOL/L (ref 22–29)
CREAT SERPL-MCNC: 1.52 MG/DL (ref 0.76–1.27)
D-LACTATE SERPL-SCNC: 0.9 MMOL/L (ref 0.5–2)
DEPRECATED RDW RBC AUTO: 44.4 FL (ref 37–54)
EGFRCR SERPLBLD CKD-EPI 2021: 45.5 ML/MIN/1.73
EOSINOPHIL # BLD AUTO: 0.71 10*3/MM3 (ref 0–0.4)
EOSINOPHIL NFR BLD AUTO: 9 % (ref 0.3–6.2)
ERYTHROCYTE [DISTWIDTH] IN BLOOD BY AUTOMATED COUNT: 12.7 % (ref 12.3–15.4)
GEN 5 2HR TROPONIN T REFLEX: 24 NG/L
GLOBULIN UR ELPH-MCNC: 3 GM/DL
GLUCOSE SERPL-MCNC: 94 MG/DL (ref 65–99)
HCT VFR BLD AUTO: 39.9 % (ref 37.5–51)
HGB BLD-MCNC: 13 G/DL (ref 13–17.7)
IMM GRANULOCYTES # BLD AUTO: 0.03 10*3/MM3 (ref 0–0.05)
IMM GRANULOCYTES NFR BLD AUTO: 0.4 % (ref 0–0.5)
INR PPP: 1.23 (ref 0.91–1.09)
LYMPHOCYTES # BLD AUTO: 1.92 10*3/MM3 (ref 0.7–3.1)
LYMPHOCYTES NFR BLD AUTO: 24.2 % (ref 19.6–45.3)
MCH RBC QN AUTO: 31.2 PG (ref 26.6–33)
MCHC RBC AUTO-ENTMCNC: 32.6 G/DL (ref 31.5–35.7)
MCV RBC AUTO: 95.7 FL (ref 79–97)
MONOCYTES # BLD AUTO: 0.91 10*3/MM3 (ref 0.1–0.9)
MONOCYTES NFR BLD AUTO: 11.5 % (ref 5–12)
NEUTROPHILS NFR BLD AUTO: 4.32 10*3/MM3 (ref 1.7–7)
NEUTROPHILS NFR BLD AUTO: 54.4 % (ref 42.7–76)
NRBC BLD AUTO-RTO: 0 /100 WBC (ref 0–0.2)
NT-PROBNP SERPL-MCNC: 416.8 PG/ML (ref 0–1800)
PLATELET # BLD AUTO: 251 10*3/MM3 (ref 140–450)
PMV BLD AUTO: 10.1 FL (ref 6–12)
POTASSIUM SERPL-SCNC: 4.6 MMOL/L (ref 3.5–5.2)
PROT SERPL-MCNC: 7.3 G/DL (ref 6–8.5)
PROTHROMBIN TIME: 15.6 SECONDS (ref 11.8–14.8)
RBC # BLD AUTO: 4.17 10*6/MM3 (ref 4.14–5.8)
SODIUM SERPL-SCNC: 139 MMOL/L (ref 136–145)
TROPONIN T DELTA: 1 NG/L
TROPONIN T SERPL HS-MCNC: 23 NG/L
TROPONIN T SERPL HS-MCNC: 24 NG/L
WBC NRBC COR # BLD: 7.93 10*3/MM3 (ref 3.4–10.8)

## 2023-10-18 PROCEDURE — 83880 ASSAY OF NATRIURETIC PEPTIDE: CPT | Performed by: FAMILY MEDICINE

## 2023-10-18 PROCEDURE — 96374 THER/PROPH/DIAG INJ IV PUSH: CPT

## 2023-10-18 PROCEDURE — 83605 ASSAY OF LACTIC ACID: CPT | Performed by: FAMILY MEDICINE

## 2023-10-18 PROCEDURE — 84484 ASSAY OF TROPONIN QUANT: CPT | Performed by: FAMILY MEDICINE

## 2023-10-18 PROCEDURE — 99285 EMERGENCY DEPT VISIT HI MDM: CPT

## 2023-10-18 PROCEDURE — 93005 ELECTROCARDIOGRAM TRACING: CPT | Performed by: FAMILY MEDICINE

## 2023-10-18 PROCEDURE — G0378 HOSPITAL OBSERVATION PER HR: HCPCS

## 2023-10-18 PROCEDURE — 85025 COMPLETE CBC W/AUTO DIFF WBC: CPT | Performed by: FAMILY MEDICINE

## 2023-10-18 PROCEDURE — 80053 COMPREHEN METABOLIC PANEL: CPT | Performed by: FAMILY MEDICINE

## 2023-10-18 PROCEDURE — 85730 THROMBOPLASTIN TIME PARTIAL: CPT | Performed by: FAMILY MEDICINE

## 2023-10-18 PROCEDURE — 84484 ASSAY OF TROPONIN QUANT: CPT | Performed by: STUDENT IN AN ORGANIZED HEALTH CARE EDUCATION/TRAINING PROGRAM

## 2023-10-18 PROCEDURE — 99284 EMERGENCY DEPT VISIT MOD MDM: CPT

## 2023-10-18 PROCEDURE — 71045 X-RAY EXAM CHEST 1 VIEW: CPT

## 2023-10-18 PROCEDURE — 36415 COLL VENOUS BLD VENIPUNCTURE: CPT | Performed by: STUDENT IN AN ORGANIZED HEALTH CARE EDUCATION/TRAINING PROGRAM

## 2023-10-18 PROCEDURE — 85610 PROTHROMBIN TIME: CPT | Performed by: FAMILY MEDICINE

## 2023-10-18 PROCEDURE — 25010000002 HYDRALAZINE PER 20 MG: Performed by: FAMILY MEDICINE

## 2023-10-18 RX ORDER — POLYETHYLENE GLYCOL 3350 17 G/17G
17 POWDER, FOR SOLUTION ORAL DAILY PRN
Status: DISCONTINUED | OUTPATIENT
Start: 2023-10-18 | End: 2023-10-19 | Stop reason: HOSPADM

## 2023-10-18 RX ORDER — AMOXICILLIN 250 MG
2 CAPSULE ORAL 2 TIMES DAILY PRN
Status: DISCONTINUED | OUTPATIENT
Start: 2023-10-18 | End: 2023-10-19 | Stop reason: HOSPADM

## 2023-10-18 RX ORDER — CHOLECALCIFEROL (VITAMIN D3) 125 MCG
5 CAPSULE ORAL NIGHTLY PRN
Status: DISCONTINUED | OUTPATIENT
Start: 2023-10-18 | End: 2023-10-19 | Stop reason: HOSPADM

## 2023-10-18 RX ORDER — ONDANSETRON 4 MG/1
4 TABLET, FILM COATED ORAL EVERY 6 HOURS PRN
Status: DISCONTINUED | OUTPATIENT
Start: 2023-10-18 | End: 2023-10-19 | Stop reason: HOSPADM

## 2023-10-18 RX ORDER — ACETAMINOPHEN 650 MG/1
650 SUPPOSITORY RECTAL EVERY 4 HOURS PRN
Status: DISCONTINUED | OUTPATIENT
Start: 2023-10-18 | End: 2023-10-19 | Stop reason: HOSPADM

## 2023-10-18 RX ORDER — ATORVASTATIN CALCIUM 10 MG/1
10 TABLET, FILM COATED ORAL NIGHTLY
Status: DISCONTINUED | OUTPATIENT
Start: 2023-10-18 | End: 2023-10-19 | Stop reason: HOSPADM

## 2023-10-18 RX ORDER — DILTIAZEM HYDROCHLORIDE 240 MG/1
240 CAPSULE, COATED, EXTENDED RELEASE ORAL DAILY
Status: DISCONTINUED | OUTPATIENT
Start: 2023-10-19 | End: 2023-10-19 | Stop reason: HOSPADM

## 2023-10-18 RX ORDER — TRAMADOL HYDROCHLORIDE 50 MG/1
50 TABLET ORAL EVERY 8 HOURS PRN
Status: DISCONTINUED | OUTPATIENT
Start: 2023-10-18 | End: 2023-10-19 | Stop reason: HOSPADM

## 2023-10-18 RX ORDER — NALOXONE HCL 0.4 MG/ML
0.4 VIAL (ML) INJECTION
Status: DISCONTINUED | OUTPATIENT
Start: 2023-10-18 | End: 2023-10-19 | Stop reason: HOSPADM

## 2023-10-18 RX ORDER — ACETAMINOPHEN 160 MG/5ML
650 SOLUTION ORAL EVERY 4 HOURS PRN
Status: DISCONTINUED | OUTPATIENT
Start: 2023-10-18 | End: 2023-10-19 | Stop reason: HOSPADM

## 2023-10-18 RX ORDER — BISACODYL 10 MG
10 SUPPOSITORY, RECTAL RECTAL DAILY PRN
Status: DISCONTINUED | OUTPATIENT
Start: 2023-10-18 | End: 2023-10-19 | Stop reason: HOSPADM

## 2023-10-18 RX ORDER — SODIUM CHLORIDE 0.9 % (FLUSH) 0.9 %
10 SYRINGE (ML) INJECTION AS NEEDED
Status: DISCONTINUED | OUTPATIENT
Start: 2023-10-18 | End: 2023-10-19 | Stop reason: HOSPADM

## 2023-10-18 RX ORDER — TAMSULOSIN HYDROCHLORIDE 0.4 MG/1
0.4 CAPSULE ORAL DAILY
Status: DISCONTINUED | OUTPATIENT
Start: 2023-10-19 | End: 2023-10-19 | Stop reason: HOSPADM

## 2023-10-18 RX ORDER — ONDANSETRON 2 MG/ML
4 INJECTION INTRAMUSCULAR; INTRAVENOUS EVERY 6 HOURS PRN
Status: DISCONTINUED | OUTPATIENT
Start: 2023-10-18 | End: 2023-10-19 | Stop reason: HOSPADM

## 2023-10-18 RX ORDER — ACETAMINOPHEN 325 MG/1
650 TABLET ORAL EVERY 4 HOURS PRN
Status: DISCONTINUED | OUTPATIENT
Start: 2023-10-18 | End: 2023-10-19 | Stop reason: HOSPADM

## 2023-10-18 RX ORDER — SODIUM CHLORIDE 0.9 % (FLUSH) 0.9 %
10 SYRINGE (ML) INJECTION EVERY 12 HOURS SCHEDULED
Status: DISCONTINUED | OUTPATIENT
Start: 2023-10-18 | End: 2023-10-19 | Stop reason: HOSPADM

## 2023-10-18 RX ORDER — PANTOPRAZOLE SODIUM 40 MG/1
40 TABLET, DELAYED RELEASE ORAL
Status: DISCONTINUED | OUTPATIENT
Start: 2023-10-19 | End: 2023-10-19 | Stop reason: HOSPADM

## 2023-10-18 RX ORDER — HYDRALAZINE HYDROCHLORIDE 20 MG/ML
10 INJECTION INTRAMUSCULAR; INTRAVENOUS ONCE
Status: COMPLETED | OUTPATIENT
Start: 2023-10-18 | End: 2023-10-18

## 2023-10-18 RX ORDER — NITROGLYCERIN 0.4 MG/1
0.4 TABLET SUBLINGUAL
Status: DISCONTINUED | OUTPATIENT
Start: 2023-10-18 | End: 2023-10-19 | Stop reason: HOSPADM

## 2023-10-18 RX ORDER — BACLOFEN 10 MG/1
10 TABLET ORAL DAILY
Status: DISCONTINUED | OUTPATIENT
Start: 2023-10-19 | End: 2023-10-18

## 2023-10-18 RX ORDER — SODIUM CHLORIDE 0.9 % (FLUSH) 0.9 %
10 SYRINGE (ML) INJECTION AS NEEDED
Status: DISCONTINUED | OUTPATIENT
Start: 2023-10-18 | End: 2023-10-19

## 2023-10-18 RX ORDER — BISACODYL 5 MG/1
5 TABLET, DELAYED RELEASE ORAL DAILY PRN
Status: DISCONTINUED | OUTPATIENT
Start: 2023-10-18 | End: 2023-10-19 | Stop reason: HOSPADM

## 2023-10-18 RX ORDER — LORAZEPAM 0.5 MG/1
0.5 TABLET ORAL EVERY 8 HOURS PRN
Status: DISCONTINUED | OUTPATIENT
Start: 2023-10-18 | End: 2023-10-19 | Stop reason: HOSPADM

## 2023-10-18 RX ORDER — SODIUM CHLORIDE 9 MG/ML
40 INJECTION, SOLUTION INTRAVENOUS AS NEEDED
Status: DISCONTINUED | OUTPATIENT
Start: 2023-10-18 | End: 2023-10-19 | Stop reason: HOSPADM

## 2023-10-18 RX ORDER — MORPHINE SULFATE 2 MG/ML
1 INJECTION, SOLUTION INTRAMUSCULAR; INTRAVENOUS EVERY 4 HOURS PRN
Status: DISCONTINUED | OUTPATIENT
Start: 2023-10-18 | End: 2023-10-19 | Stop reason: HOSPADM

## 2023-10-18 RX ORDER — ASPIRIN 81 MG/1
324 TABLET, CHEWABLE ORAL ONCE
Status: DISCONTINUED | OUTPATIENT
Start: 2023-10-18 | End: 2023-10-18

## 2023-10-18 RX ORDER — DILTIAZEM HYDROCHLORIDE 240 MG/1
240 CAPSULE, COATED, EXTENDED RELEASE ORAL DAILY
COMMUNITY

## 2023-10-18 RX ADMIN — ACETAMINOPHEN 650 MG: 325 TABLET, FILM COATED ORAL at 20:13

## 2023-10-18 RX ADMIN — Medication 10 ML: at 20:16

## 2023-10-18 RX ADMIN — HYDRALAZINE HYDROCHLORIDE 10 MG: 20 INJECTION INTRAMUSCULAR; INTRAVENOUS at 14:22

## 2023-10-18 RX ADMIN — APIXABAN 2.5 MG: 2.5 TABLET, FILM COATED ORAL at 20:13

## 2023-10-18 RX ADMIN — METOPROLOL TARTRATE 75 MG: 50 TABLET, FILM COATED ORAL at 20:13

## 2023-10-18 RX ADMIN — Medication 5 MG: at 22:12

## 2023-10-18 RX ADMIN — ATORVASTATIN CALCIUM 10 MG: 10 TABLET, FILM COATED ORAL at 20:13

## 2023-10-18 NOTE — ED PROVIDER NOTES
HPI:     This is a 83-year-old white male who presents to the emergency room with a complaint of having substernal chest pain.  Patient states the pain started at 1230 today while he was at his doctor's office. and diltiazem for the atrial fibs in his blood pressure.  Patient states his blood pressure was elevated at his doctor's office as well as here when he first arrived.  Patient states he had a little short of breath when the pain was greater however the shortness of breath has now resolved.  Chest pain was sub-sternal and did not radiate.  No fevers chills or night sweats no nausea vomiting diarrhea no abdominal pain.  No diaphoresis.      REVIEW OF SYSTEMS  CONSTITUTIONAL:  No complaints of fever, chills,or weakness  EYES:  No complaints of discharge   ENT: No complaints of sore throat or ear pain  CARDIOVASCULAR:  No complaints o the pain at his max was 7 out of 10 and currently 3 out of 10.  Patient takes Eliquis for atrial fibrillation and has not missed any doses.  He also takes metoprolol chest pain, palpitations, or swelling  RESPIRATORY:  No complaints of cough or shortness of breath  GI:  No complaints of abdominal pain, nausea, vomiting, or diarrhea  MUSCULOSKELETAL:  No complaints of back pain  SKIN:  No complaints of rash  NEUROLOGIC:  No complaints of headache, focal weakness, or sensory changes  ENDOCRINE:  No complaints of polyuria or polydipsia  LYMPHATIC:  No complaints of swollen glands  GENITOURINARY: No complaints of urinary frequency or hematuria        PAST MEDICAL HISTORY  Past Medical History:   Diagnosis Date    Arthritis     Atrial fibrillation     Diverticulosis     GERD (gastroesophageal reflux disease)     Hyperlipidemia     Hypertension     Plantar fasciitis     Shingles     Skin cancer     Urticaria        FAMILY HISTORY  Family History   Problem Relation Age of Onset    Crohn's disease Son     Heart disease Father     Cancer Mother     Stroke Brother     Colon cancer Neg Hx      Colon polyps Neg Hx        SOCIAL HISTORY  Social History     Socioeconomic History    Marital status:    Tobacco Use    Smoking status: Former    Smokeless tobacco: Former     Types: Chew   Vaping Use    Vaping Use: Never used   Substance and Sexual Activity    Alcohol use: Yes     Alcohol/week: 4.0 - 5.0 standard drinks of alcohol     Types: 4 - 5 Cans of beer per week     Comment: occ    Drug use: No    Sexual activity: Defer       IMMUNIZATION HISTORY  Deferred to primary care physician.    SURGICAL HISTORY  Past Surgical History:   Procedure Laterality Date    COLONOSCOPY  06/02/2016    5yr colon recall based on prep adequate to identify polyps greater than 6 mm    ENDOSCOPY  06/14/2011    INGUINAL HERNIA REPAIR      REPLACEMENT TOTAL KNEE      SHOULDER SURGERY Bilateral        CURRENT MEDICATIONS    Current Facility-Administered Medications:     aspirin chewable tablet 324 mg, 324 mg, Oral, Once, Brandon Dumont Jr., MD    [COMPLETED] Insert Peripheral IV, , , Once **AND** sodium chloride 0.9 % flush 10 mL, 10 mL, Intravenous, PRN, Brandon Dumont Jr., MD    Current Outpatient Medications:     acetaminophen (TYLENOL) 500 MG tablet, Take 1,000 mg by mouth Every 6 (Six) Hours As Needed for Mild Pain ., Disp: , Rfl:     apixaban (ELIQUIS) 5 MG tablet tablet, Take 1 tablet by mouth Every 12 (Twelve) Hours., Disp: 180 tablet, Rfl: 3    baclofen (LIORESAL) 10 MG tablet, Take 10 mg by mouth Daily., Disp: , Rfl:     Cholecalciferol (VITAMIN D3) 125 MCG (5000 UT) capsule capsule, Take 5,000 Units by mouth Daily., Disp: , Rfl:     dilTIAZem CD (CARDIZEM CD) 240 MG 24 hr capsule, TAKE 1 CAPSULE EVERY DAY, Disp: 90 capsule, Rfl: 3    fenofibrate 160 MG tablet, Take 160 mg by mouth Daily., Disp: , Rfl:     gemfibrozil (LOPID) 600 MG tablet, Take 600 mg by mouth 2 (Two) Times a Day Before Meals., Disp: , Rfl:     Metoprolol Tartrate 75 MG tablet, Take 75 mg by mouth 2 (Two) Times a Day., Disp: , Rfl:      "omeprazole (priLOSEC) 20 MG capsule, Take 1 capsule by mouth Daily., Disp: 90 capsule, Rfl: 3    simvastatin (ZOCOR) 10 MG tablet, Take 10 mg by mouth Every Night., Disp: , Rfl:     tamsulosin (FLOMAX) 0.4 MG capsule 24 hr capsule, Take 1 capsule by mouth Daily., Disp: 90 capsule, Rfl: 3    traMADol (ULTRAM) 50 MG tablet, Take 1 tablet by mouth 1 (One) Time., Disp: , Rfl:     ALLERGIES  Allergies   Allergen Reactions    Contrast Dye (Echo Or Unknown Ct/Mr) Other (See Comments)     flush           Cardiac exam    VITAL SIGNS:  /83   Pulse 67   Temp 97 °F (36.1 °C) (Temporal)   Resp 19   Ht 182.9 cm (72\")   Wt 79.4 kg (175 lb)   SpO2 94%   BMI 23.73 kg/m²     Constitutional: Patient is alert and in no distress.  Patient with moderate chest discomfort.    ENT: There is a normal pharynx with no acute erythema or exudate and oral mucosa is moist.  Nose is clear with no drainage.  Tympanic membranes intact and nonerythemic    Respiratory: Patient is clear to auscultation bilaterally with no wheezing or rhonchi.  Chest wall is mildly tender to palpation which reproduces some of the patient's pain.  There are no external lesions on the chest.  There is no crepitance    Cardiovascular: S1-S2 bradycardic with a diastolic murmur 1 out of 5.    Abdomen: Soft, nontender, and  bowel sounds are normal in all 4 quadrants.  There is no rebound or guarding noted.  There is no abdominal distention or hepatosplenomegaly.    Genitourinary: Patient is voiding appropriately.    Integument: No acute lesions noted and color appears to be normal.    [place] Coma Scale: Total score 15    Neurological: Patient is alert and oriented x4 and no acute findings noted.  Speech is fluent and cognition is normal.  No evidence of acute CVA.  Cranial nerves II through XII intact.  Patient with normal motor function as well as reflexes and sensation      RADIOLOGY/PROCEDURES      XR Chest 1 View   Final Result   New infiltrative opacity at " the right cardiophrenic angle   though the most recent chest x-ray is from 14 years ago. This could be   due to atelectasis, pneumonia, a pericardial cyst or less likely an   underlying mass. Follow-up chest x-rays are recommended within 3 to 4   weeks.       This report was signed and finalized on 10/18/2023 1:56 PM CDT by Dr. Silvestre Jarrell MD.                 FUTURE APPOINTMENTS     Future Appointments   Date Time Provider Department Center   2/15/2024 10:30 AM Jeremie Lala MD [facility]          EKG (reviewed and interpreted by me): Sinus bradycardia with a rate of 53.  No acute ST segment elevation or depression appreciated.        HEART SCORE     Patient history  1      Moderately suspicious (1 point)    2   ECG       Nonspecific repolarization disturbance (1 point)    3   Patient age     Equal or higher than 65 (2 points)    4   Risk factors (Hypercholesterolemia, Hypertension, diabetes, smoking, obesity)     More than 3 risk factors or atherosclerosis history (2 points)    5   Troponin       1 to 3 times higher than normal (1 point)      6     TOTAL RISK NUMBER: 7    The three risk categories are described below:  Heart score MACE risk Recommendation  0 - 3 Low (1.7%) Discharge can be an option.  4 - 6 Intermediate (20.3%) Clinical observation and further investigations.  7 - 10 High (72.2%) Immediate invasive treatment        COURSE & MEDICAL DECISION MAKING       Patient's partial differential diagnosis can include: Non-STEMI, unstable angina, pneumothorax, pneumonia, pneumonitis, non-STEMI, and other      Down at bedside with the patient to discuss his laboratory and radiological test.  Attempted to get a stress test for the patient during the day however they have left for the day.  Due to the patient's risk factors and presentation I do feel that the patient needs to be admitted to the hospital continue to be monitored on telemetry as well as having serial troponins.  Consideration for stress  echo or even a heart cath should be considered.  Consultation to cardiology is definitely highly recommended.    Discussed case with Dr. Cruz who states the patient can be admitted under Dr. Breen        Patient's level of risk: High        CRITICAL CARE    CRITICAL CARE: No    CRITICAL CARE TIME: None      Recent Results (from the past 24 hour(s))   ECG 12 Lead Chest Pain    Collection Time: 10/18/23 12:58 PM   Result Value Ref Range    QT Interval 448 ms    QTC Interval 420 ms   Comprehensive Metabolic Panel    Collection Time: 10/18/23  1:45 PM    Specimen: Blood   Result Value Ref Range    Glucose 94 65 - 99 mg/dL    BUN 19 8 - 23 mg/dL    Creatinine 1.52 (H) 0.76 - 1.27 mg/dL    Sodium 139 136 - 145 mmol/L    Potassium 4.6 3.5 - 5.2 mmol/L    Chloride 104 98 - 107 mmol/L    CO2 26.0 22.0 - 29.0 mmol/L    Calcium 9.3 8.6 - 10.5 mg/dL    Total Protein 7.3 6.0 - 8.5 g/dL    Albumin 4.3 3.5 - 5.2 g/dL    ALT (SGPT) 9 1 - 41 U/L    AST (SGOT) 20 1 - 40 U/L    Alkaline Phosphatase 144 (H) 39 - 117 U/L    Total Bilirubin 0.3 0.0 - 1.2 mg/dL    Globulin 3.0 gm/dL    A/G Ratio 1.4 g/dL    BUN/Creatinine Ratio 12.5 7.0 - 25.0    Anion Gap 9.0 5.0 - 15.0 mmol/L    eGFR 45.5 (L) >60.0 mL/min/1.73   Protime-INR    Collection Time: 10/18/23  1:45 PM    Specimen: Blood   Result Value Ref Range    Protime 15.6 (H) 11.8 - 14.8 Seconds    INR 1.23 (H) 0.91 - 1.09   aPTT    Collection Time: 10/18/23  1:45 PM    Specimen: Blood   Result Value Ref Range    PTT 33.7 24.5 - 36.0 seconds   BNP    Collection Time: 10/18/23  1:45 PM    Specimen: Blood   Result Value Ref Range    proBNP 416.8 0.0 - 1,800.0 pg/mL   High Sensitivity Troponin T    Collection Time: 10/18/23  1:45 PM    Specimen: Blood   Result Value Ref Range    HS Troponin T 23 (H) <15 ng/L   CBC Auto Differential    Collection Time: 10/18/23  1:45 PM    Specimen: Blood   Result Value Ref Range    WBC 7.93 3.40 - 10.80 10*3/mm3    RBC 4.17 4.14 - 5.80 10*6/mm3     Hemoglobin 13.0 13.0 - 17.7 g/dL    Hematocrit 39.9 37.5 - 51.0 %    MCV 95.7 79.0 - 97.0 fL    MCH 31.2 26.6 - 33.0 pg    MCHC 32.6 31.5 - 35.7 g/dL    RDW 12.7 12.3 - 15.4 %    RDW-SD 44.4 37.0 - 54.0 fl    MPV 10.1 6.0 - 12.0 fL    Platelets 251 140 - 450 10*3/mm3    Neutrophil % 54.4 42.7 - 76.0 %    Lymphocyte % 24.2 19.6 - 45.3 %    Monocyte % 11.5 5.0 - 12.0 %    Eosinophil % 9.0 (H) 0.3 - 6.2 %    Basophil % 0.5 0.0 - 1.5 %    Immature Grans % 0.4 0.0 - 0.5 %    Neutrophils, Absolute 4.32 1.70 - 7.00 10*3/mm3    Lymphocytes, Absolute 1.92 0.70 - 3.10 10*3/mm3    Monocytes, Absolute 0.91 (H) 0.10 - 0.90 10*3/mm3    Eosinophils, Absolute 0.71 (H) 0.00 - 0.40 10*3/mm3    Basophils, Absolute 0.04 0.00 - 0.20 10*3/mm3    Immature Grans, Absolute 0.03 0.00 - 0.05 10*3/mm3    nRBC 0.0 0.0 - 0.2 /100 WBC   High Sensitivity Troponin T 2Hr    Collection Time: 10/18/23  3:54 PM    Specimen: Blood   Result Value Ref Range    HS Troponin T 24 (H) <15 ng/L    Troponin T Delta 1 >=-4 - <+4 ng/L   Lactic Acid, Plasma    Collection Time: 10/18/23  3:54 PM    Specimen: Blood   Result Value Ref Range    Lactate 0.9 0.5 - 2.0 mmol/L          Old charts were reviewed per [facility] EMR.  Pertinent details are summarized above.  All laboratory, radiologic, and EKG studies that were performed in the Emergency Department were a necessary part of the evaluation needed to exclude unstable or  emergent medical conditions.     Patient was hemodynamically and neurologically stable in the ED.   Pertinent studies were reviewed as above.     The patient received:  Medications   sodium chloride 0.9 % flush 10 mL (has no administration in time range)   aspirin chewable tablet 324 mg (324 mg Oral Not Given 10/18/23 1347)   hydrALAZINE (APRESOLINE) injection 10 mg (10 mg Intravenous Given 10/18/23 1422)            ED Disposition       ED Disposition   Decision to Admit    Condition   --    Comment   Level of Care: Telemetry [5]   Diagnosis: Unstable  angina [203240]   Admitting Physician: SALAZAR PARK [[id number]]   Attending Physician: SALAZAR PARK [[id number]]                   Dragon disclaimer:  Part of this note may be an electronic transcription/translation of spoken language to printed text using the Dragon Dictation System. Due to this some dictation errors could occur in the chart.    I have reviewed the patient’s prescription history via a prescription monitoring program.  This information is consistent with my knowledge of the patient’s controlled substance use history.    Patient evaluated during Coronavirus Pandemic. Isolation practices followed according to [facility] policy.     FINAL IMPRESSION   Diagnosis Plan   1. Unstable angina        2. Chronic renal insufficiency, stage 1        3. Elevated troponin              MD Tim Valle Jr, Thomas Mark Jr., MD  10/18/23 1652       Brandon Dumont Jr., MD  10/18/23 1653       Brandon Dumont Jr., MD  10/18/23 1657       Brandon Dumont Jr., MD  10/18/23 1658

## 2023-10-18 NOTE — ED NOTES
Report called to Kandi HER RN    Nursing report ED to floor  Fabian Velez Sr.  83 y.o.  male    HPI:   Chief Complaint   Patient presents with    Chest Pain       Admitting doctor:   Kirit Breen MD    Consulting provider(s):  Consults       No orders found from 9/19/2023 to 10/19/2023.             Admitting diagnosis:   The primary encounter diagnosis was Unstable angina. Diagnoses of Chronic renal insufficiency, stage 1 and Elevated troponin were also pertinent to this visit.    Code status:   Current Code Status       Date Active Code Status Order ID Comments User Context       Not on file            Allergies:   Contrast dye (echo or unknown ct/mr)    Intake and Output  No intake or output data in the 24 hours ending 10/18/23 1707    Weight:       10/18/23  1300   Weight: 79.4 kg (175 lb)       Most recent vitals:   Vitals:    10/18/23 1531 10/18/23 1546 10/18/23 1616 10/18/23 1701   BP: 143/75 149/80 157/83 164/65   Pulse: 69 68 67 79   Resp:   19    Temp:       TempSrc:       SpO2: 97% 97% 94% 96%   Weight:       Height:         Oxygen Therapy: .    Active LDAs/IV Access:   Lines, Drains & Airways       Active LDAs       Name Placement date Placement time Site Days    Peripheral IV 10/18/23 1346 Left Antecubital 10/18/23  1346  Antecubital  less than 1                    Labs (abnormal labs have a star):   Labs Reviewed   COMPREHENSIVE METABOLIC PANEL - Abnormal; Notable for the following components:       Result Value    Creatinine 1.52 (*)     Alkaline Phosphatase 144 (*)     eGFR 45.5 (*)     All other components within normal limits    Narrative:     GFR Normal >60  Chronic Kidney Disease <60  Kidney Failure <15    The GFR formula is only valid for adults with stable renal function between ages 18 and 70.   PROTIME-INR - Abnormal; Notable for the following components:    Protime 15.6 (*)     INR 1.23 (*)     All other components within normal limits   TROPONIN - Abnormal; Notable for the following  components:    HS Troponin T 23 (*)     All other components within normal limits    Narrative:     High Sensitive Troponin T Reference Range:  <10.0 ng/L- Negative Female for AMI  <15.0 ng/L- Negative Male for AMI  >=10 - Abnormal Female indicating possible myocardial injury.  >=15 - Abnormal Male indicating possible myocardial injury.   Clinicians would have to utilize clinical acumen, EKG, Troponin, and serial changes to determine if it is an Acute Myocardial Infarction or myocardial injury due to an underlying chronic condition.        CBC WITH AUTO DIFFERENTIAL - Abnormal; Notable for the following components:    Eosinophil % 9.0 (*)     Monocytes, Absolute 0.91 (*)     Eosinophils, Absolute 0.71 (*)     All other components within normal limits   HIGH SENSITIVITIY TROPONIN T 2HR - Abnormal; Notable for the following components:    HS Troponin T 24 (*)     All other components within normal limits    Narrative:     High Sensitive Troponin T Reference Range:  <10.0 ng/L- Negative Female for AMI  <15.0 ng/L- Negative Male for AMI  >=10 - Abnormal Female indicating possible myocardial injury.  >=15 - Abnormal Male indicating possible myocardial injury.   Clinicians would have to utilize clinical acumen, EKG, Troponin, and serial changes to determine if it is an Acute Myocardial Infarction or myocardial injury due to an underlying chronic condition.        APTT - Normal   BNP (IN-HOUSE) - Normal    Narrative:     This assay is used as an aid in the diagnosis of individuals suspected of having heart failure. It can be used as an aid in the diagnosis of acute decompensated heart failure (ADHF) in patients presenting with signs and symptoms of ADHF to the emergency department (ED). In addition, NT-proBNP of <300 pg/mL indicates ADHF is not likely.    Age Range Result Interpretation  NT-proBNP Concentration (pg/mL:      <50             Positive            >450                   Gray                 300-450                     Negative             <300    50-75           Positive            >900                  Gray                300-900                  Negative            <300      >75             Positive            >1800                  Gray                300-1800                  Negative            <300   LACTIC ACID, PLASMA - Normal   CBC AND DIFFERENTIAL    Narrative:     The following orders were created for panel order CBC & Differential.  Procedure                               Abnormality         Status                     ---------                               -----------         ------                     CBC Auto Differential[[id number]]        Abnormal            Final result                 Please view results for these tests on the individual orders.       Meds given in ED:   Medications   sodium chloride 0.9 % flush 10 mL (has no administration in time range)   aspirin chewable tablet 324 mg (324 mg Oral Not Given 10/18/23 1347)   hydrALAZINE (APRESOLINE) injection 10 mg (10 mg Intravenous Given 10/18/23 1422)           NIH Stroke Scale:       Isolation/Infection(s):  No active isolations   No active infections     COVID Testing  Collected .  Resulted .    Nursing report ED to floor:  Mental status: .  Ambulatory status: .  Precautions: .    ED nurse phone extentsion- ..

## 2023-10-18 NOTE — H&P
[facility] Medicine Services  HISTORY AND PHYSICAL    Date of Admission: 10/18/2023  Primary Care Physician: Kaleb Villavicencio MD    Subjective   Primary Historian: Patient     Chief Complaint: chest pain     History of Present Illness  83-year-old male with history of atrial fibrillation, hypertension, hyperlipidemia, GERD, and arthritis who presented to the emergency room due to chest pain.  Patient states pain is located in the middle of his chest.  Describes as pressure-like pain without radiation.  Patient reports moderate pain quality.  No exacerbating or relieving factors.  Reports associated shortness of breath.  Patient had been to his doctor's office earlier today and was advised to go to the emergency room due to elevated blood pressure and chest pain symptoms.  Patient does have chronic shortness of breath which he attributes to atrial fibrillation.  Initial EKG was sinus bradycardia.  At bedtime troponin T elevated at 23 and 24.  Creatinine was 1.52.  UA significant for trace ketones and blood.  Chest x-ray showed New infiltrative opacity at the right cardiophrenic angle though the most recent chest x-ray is from 14 years ago.   Patient admits to drinking 4-5 beers every day.    Review of Systems   Respiratory:  Positive for chest tightness and shortness of breath.       Otherwise complete ROS reviewed and negative except as mentioned in the HPI.    Past Medical History:   Past Medical History:   Diagnosis Date    Arthritis     Atrial fibrillation     Diverticulosis     GERD (gastroesophageal reflux disease)     Hyperlipidemia     Hypertension     Plantar fasciitis     Shingles     Skin cancer     Urticaria      Past Surgical History:  Past Surgical History:   Procedure Laterality Date    COLONOSCOPY  06/02/2016    5yr colon recall based on prep adequate to identify polyps greater than 6 mm    ENDOSCOPY  06/14/2011    INGUINAL HERNIA REPAIR      REPLACEMENT TOTAL  KNEE      SHOULDER SURGERY Bilateral      Social History:  reports that he has quit smoking. He has quit using smokeless tobacco.  His smokeless tobacco use included chew. He reports current alcohol use of about 4.0 - 5.0 standard drinks of alcohol per week. He reports that he does not use drugs.    Family History: family history includes Cancer in his mother; Crohn's disease in his son; Heart disease in his father; Stroke in his brother.       Allergies:  Allergies   Allergen Reactions    Contrast Dye (Echo Or Unknown Ct/Mr) Other (See Comments)     flush       Medications:  Prior to Admission medications    Medication Sig Start Date End Date Taking? Authorizing Provider   acetaminophen (TYLENOL) 500 MG tablet Take 1,000 mg by mouth Every 6 (Six) Hours As Needed for Mild Pain .    Alejandro Chino MD   apixaban (ELIQUIS) 5 MG tablet tablet Take 1 tablet by mouth Every 12 (Twelve) Hours. 4/9/21   Jenny Linares APRN   baclofen (LIORESAL) 10 MG tablet Take 10 mg by mouth Daily.    Alejandro Chino MD   Cholecalciferol (VITAMIN D3) 125 MCG (5000 UT) capsule capsule Take 5,000 Units by mouth Daily.    Alejandro Chino MD   dilTIAZem CD (CARDIZEM CD) 240 MG 24 hr capsule TAKE 1 CAPSULE EVERY DAY 5/22/23   Jenny Linares APRN   fenofibrate 160 MG tablet Take 160 mg by mouth Daily.    Alejandro Chino MD   gemfibrozil (LOPID) 600 MG tablet Take 600 mg by mouth 2 (Two) Times a Day Before Meals.    Mariely Desir APRN   Metoprolol Tartrate 75 MG tablet Take 75 mg by mouth 2 (Two) Times a Day.    Alejandro Chino MD   omeprazole (priLOSEC) 20 MG capsule Take 1 capsule by mouth Daily. 1/3/23   Antonio Salgado MD   simvastatin (ZOCOR) 10 MG tablet Take 10 mg by mouth Every Night.    Alejandro Chino MD   tamsulosin (FLOMAX) 0.4 MG capsule 24 hr capsule Take 1 capsule by mouth Daily. 4/19/21   Eyal Euceda MD   traMADol (ULTRAM) 50 MG tablet Take 1 tablet by mouth 1 (One) Time.     "Provider, MD Alejandro     I have utilized all available immediate resources to obtain, update, or review the patient's current medications (including all prescriptions, over-the-counter products, herbals, cannabis/cannabidiol products, and vitamin/mineral/dietary (nutritional) supplements).    Objective     Vital Signs: /65   Pulse 79   Temp 97 °F (36.1 °C) (Temporal)   Resp 19   Ht 182.9 cm (72\")   Wt 79.4 kg (175 lb)   SpO2 96%   BMI 23.73 kg/m²   Physical Exam  Vitals and nursing note reviewed.   Constitutional:       General: He is not in acute distress.     Appearance: Normal appearance. He is not diaphoretic.   HENT:      Head: Normocephalic and atraumatic.      Right Ear: External ear normal.      Left Ear: External ear normal.   Eyes:      General: No scleral icterus.     Extraocular Movements: Extraocular movements intact.      Conjunctiva/sclera: Conjunctivae normal.   Cardiovascular:      Rate and Rhythm: Normal rate and regular rhythm.      Heart sounds: Normal heart sounds.   Pulmonary:      Effort: Pulmonary effort is normal. No respiratory distress.      Breath sounds: Normal breath sounds.   Chest:      Chest wall: No tenderness.   Abdominal:      General: Bowel sounds are normal.      Palpations: Abdomen is soft.      Tenderness: There is no abdominal tenderness.   Musculoskeletal:         General: No swelling or tenderness.      Cervical back: Normal range of motion and neck supple.      Right lower leg: No edema.      Left lower leg: No edema.   Skin:     General: Skin is warm and dry.      Capillary Refill: Capillary refill takes less than 2 seconds.      Findings: No erythema or rash.   Neurological:      General: No focal deficit present.      Mental Status: He is alert.      Motor: No weakness.   Psychiatric:         Behavior: Behavior normal.            Results Reviewed:  Lab Results (last 24 hours)       Procedure Component Value Units Date/Time    High Sensitivity Troponin T " 2Hr [[id number]]  (Abnormal) Collected: 10/18/23 1554    Specimen: Blood Updated: 10/18/23 1623     HS Troponin T 24 ng/L      Troponin T Delta 1 ng/L     Narrative:      High Sensitive Troponin T Reference Range:  <10.0 ng/L- Negative Female for AMI  <15.0 ng/L- Negative Male for AMI  >=10 - Abnormal Female indicating possible myocardial injury.  >=15 - Abnormal Male indicating possible myocardial injury.   Clinicians would have to utilize clinical acumen, EKG, Troponin, and serial changes to determine if it is an Acute Myocardial Infarction or myocardial injury due to an underlying chronic condition.         Lactic Acid, Plasma [[id number]]  (Normal) Collected: 10/18/23 1554    Specimen: Blood Updated: 10/18/23 1622     Lactate 0.9 mmol/L     Comprehensive Metabolic Panel [[id number]]  (Abnormal) Collected: 10/18/23 1345    Specimen: Blood Updated: 10/18/23 1414     Glucose 94 mg/dL      BUN 19 mg/dL      Creatinine 1.52 mg/dL      Sodium 139 mmol/L      Potassium 4.6 mmol/L      Comment: Slight hemolysis detected by analyzer. Results may be affected.        Chloride 104 mmol/L      CO2 26.0 mmol/L      Calcium 9.3 mg/dL      Total Protein 7.3 g/dL      Albumin 4.3 g/dL      ALT (SGPT) 9 U/L      AST (SGOT) 20 U/L      Alkaline Phosphatase 144 U/L      Total Bilirubin 0.3 mg/dL      Globulin 3.0 gm/dL      A/G Ratio 1.4 g/dL      BUN/Creatinine Ratio 12.5     Anion Gap 9.0 mmol/L      eGFR 45.5 mL/min/1.73     Narrative:      GFR Normal >60  Chronic Kidney Disease <60  Kidney Failure <15    The GFR formula is only valid for adults with stable renal function between ages 18 and 70.    BNP [[id number]]  (Normal) Collected: 10/18/23 1345    Specimen: Blood Updated: 10/18/23 1411     proBNP 416.8 pg/mL     Narrative:      This assay is used as an aid in the diagnosis of individuals suspected of having heart failure. It can be used as an aid in the diagnosis of acute decompensated heart failure (ADHF) in patients  presenting with signs and symptoms of ADHF to the emergency department (ED). In addition, NT-proBNP of <300 pg/mL indicates ADHF is not likely.    Age Range Result Interpretation  NT-proBNP Concentration (pg/mL:      <50             Positive            >450                   Gray                 300-450                    Negative             <300    50-75           Positive            >900                  Gray                300-900                  Negative            <300      >75             Positive            >1800                  Gray                300-1800                  Negative            <300    High Sensitivity Troponin T [[id number]]  (Abnormal) Collected: 10/18/23 1345    Specimen: Blood Updated: 10/18/23 1410     HS Troponin T 23 ng/L     Narrative:      High Sensitive Troponin T Reference Range:  <10.0 ng/L- Negative Female for AMI  <15.0 ng/L- Negative Male for AMI  >=10 - Abnormal Female indicating possible myocardial injury.  >=15 - Abnormal Male indicating possible myocardial injury.   Clinicians would have to utilize clinical acumen, EKG, Troponin, and serial changes to determine if it is an Acute Myocardial Infarction or myocardial injury due to an underlying chronic condition.         Protime-INR [[id number]]  (Abnormal) Collected: 10/18/23 1345    Specimen: Blood Updated: 10/18/23 1403     Protime 15.6 Seconds      INR 1.23    aPTT [[id number]]  (Normal) Collected: 10/18/23 1345    Specimen: Blood Updated: 10/18/23 1403     PTT 33.7 seconds     CBC & Differential [[id number]]  (Abnormal) Collected: 10/18/23 1345    Specimen: Blood Updated: 10/18/23 1355    Narrative:      The following orders were created for panel order CBC & Differential.  Procedure                               Abnormality         Status                     ---------                               -----------         ------                     CBC Auto Differential[[id number]]        Abnormal            Final result                  Please view results for these tests on the individual orders.    CBC Auto Differential [[id number]]  (Abnormal) Collected: 10/18/23 1345    Specimen: Blood Updated: 10/18/23 1355     WBC 7.93 10*3/mm3      RBC 4.17 10*6/mm3      Hemoglobin 13.0 g/dL      Hematocrit 39.9 %      MCV 95.7 fL      MCH 31.2 pg      MCHC 32.6 g/dL      RDW 12.7 %      RDW-SD 44.4 fl      MPV 10.1 fL      Platelets 251 10*3/mm3      Neutrophil % 54.4 %      Lymphocyte % 24.2 %      Monocyte % 11.5 %      Eosinophil % 9.0 %      Basophil % 0.5 %      Immature Grans % 0.4 %      Neutrophils, Absolute 4.32 10*3/mm3      Lymphocytes, Absolute 1.92 10*3/mm3      Monocytes, Absolute 0.91 10*3/mm3      Eosinophils, Absolute 0.71 10*3/mm3      Basophils, Absolute 0.04 10*3/mm3      Immature Grans, Absolute 0.03 10*3/mm3      nRBC 0.0 /100 WBC           Imaging Results (Last 24 Hours)       Procedure Component Value Units Date/Time    XR Chest 1 View [[id number]] Collected: 10/18/23 1346     Updated: 10/18/23 1400    Narrative:      EXAMINATION: XR CHEST 1 VW- 10/18/2023 1:46 PM CDT     HISTORY: chest pain.     REPORT: A frontal view of the chest was obtained.     COMPARISON: Chest x-rays 1/15/2009.     The lungs are hypoaerated, there is an opacity at the right  cardiophrenic angle with a convex margin which was not present on the  remote chest x-ray, possibly with some associated bronchograms. No focal  infiltrate is seen in the left lung. There is borderline cardiomegaly  without evidence of overt CHF. No pneumothorax or pleural effusion is  identified. The osseous structures and upper abdomen are unremarkable.       Impression:      New infiltrative opacity at the right cardiophrenic angle  though the most recent chest x-ray is from 14 years ago. This could be  due to atelectasis, pneumonia, a pericardial cyst or less likely an  underlying mass. Follow-up chest x-rays are recommended within 3 to 4  weeks.     This report was signed and  finalized on 10/18/2023 1:56 PM CDT by Dr. Silvestre Jarrell MD.             I have personally reviewed and interpreted the radiology studies and ECG obtained at time of admission.     Assessment / Plan   Assessment:   Active Hospital Problems    Diagnosis     **Unstable angina     Hyperlipidemia     Atrial fibrillation     Hypertension     Low back pain        Treatment Plan  The patient will be admitted to my service here at [facility].   Admit patient to telemetry floor.  As needed oxygen, morphine, nitroglycerin.  Trend troponin  Stress test in the morning  N.p.o. after midnight  Monitor daily labs  CIWA assessment every shift  Resume chronic medications    Currently takes 5 mg twice daily of Eliquis.  Based on age, creatinine greater than 1.5.  Patient meets 2 out of 3 criteria for dose reduction.  Changed Eliquis to 2.5 mg twice daily.    DVT prophylaxis Eliquis.  Medical Decision Making  Number and Complexity of problems: 1 acute problem with high complexity.  Differential Diagnosis: As above    Conditions and Status        Condition is improving.     [facility] Data  External documents reviewed: Epic records  Cardiac tracing (EKG, telemetry) interpretation: EKG-sinus bradycardia  Radiology interpretation: Imaging reviewed  Labs reviewed: Yes  Any tests that were considered but not ordered: None     Decision rules/scores evaluated (example [id number], Wells, etc):   CHADS-VASc Risk Assessment              3 Total Score    1 Hypertension    2 Age >/= 75        Criteria that do not apply:    CHF    DM    PRIOR STROKE/TIA/THROMBO    Vascular Disease    Age 65-74    Sex: Female               Discussed with: Patient and family in the room.     Care Planning  Shared decision making: Patient apprised of current labs, vitals, imaging and treatment plan.  They are agreeable with proceeding with plans as discussed.    Code status and discussions:   Code Status and Medical Interventions:   Ordered at: 10/18/23  1738     Level Of Support Discussed With:    Patient     Code Status (Patient has no pulse and is not breathing):    CPR (Attempt to Resuscitate)     Medical Interventions (Patient has pulse or is breathing):    Full Support         Disposition  Social Determinants of Health that impact treatment or disposition: None  Estimated length of stay is 1 to 2 days.     I confirmed that the patient's advanced care plan is present, code status is documented, and a surrogate decision maker is listed in the patient's medical record.     The patient's surrogate decision maker is spouse.  Information confirmed on ACP documentation.    The patient was seen and examined by me on 10/18/2023 at 1710.    Electronically signed by Kirit Breen MD, 10/18/23, 17:41 CDT.

## 2023-10-19 ENCOUNTER — APPOINTMENT (OUTPATIENT)
Dept: CARDIOLOGY | Facility: HOSPITAL | Age: 83
End: 2023-10-19
Payer: MEDICARE

## 2023-10-19 VITALS
HEART RATE: 63 BPM | OXYGEN SATURATION: 96 % | TEMPERATURE: 97.5 F | HEIGHT: 72 IN | BODY MASS INDEX: 23.57 KG/M2 | SYSTOLIC BLOOD PRESSURE: 123 MMHG | RESPIRATION RATE: 18 BRPM | WEIGHT: 174 LBS | DIASTOLIC BLOOD PRESSURE: 76 MMHG

## 2023-10-19 PROBLEM — N18.30 CKD (CHRONIC KIDNEY DISEASE) STAGE 3, GFR 30-59 ML/MIN: Status: ACTIVE | Noted: 2023-10-19

## 2023-10-19 LAB
ANION GAP SERPL CALCULATED.3IONS-SCNC: 10 MMOL/L (ref 5–15)
BASOPHILS # BLD AUTO: 0.04 10*3/MM3 (ref 0–0.2)
BASOPHILS NFR BLD AUTO: 0.5 % (ref 0–1.5)
BH CV STRESS BP STAGE 1: NORMAL
BH CV STRESS BP STAGE 2: NORMAL
BH CV STRESS BP STAGE 3: NORMAL
BH CV STRESS DOB - ATROPINE STAGE 3: 0.3
BH CV STRESS DOSE DOBUTAMINE STAGE 1: 10
BH CV STRESS DOSE DOBUTAMINE STAGE 2: 20
BH CV STRESS DOSE DOBUTAMINE STAGE 3: 30
BH CV STRESS DURATION MIN STAGE 1: 3
BH CV STRESS DURATION MIN STAGE 2: 3
BH CV STRESS DURATION MIN STAGE 3: 2
BH CV STRESS DURATION SEC STAGE 1: 0
BH CV STRESS DURATION SEC STAGE 2: 0
BH CV STRESS DURATION SEC STAGE 3: 36
BH CV STRESS ECHO POST STRESS EJECTION FRACTION EF: 80 %
BH CV STRESS HR STAGE 1: 69
BH CV STRESS HR STAGE 2: 87
BH CV STRESS HR STAGE 3: 127
BH CV STRESS PROTOCOL 1: NORMAL
BH CV STRESS RECOVERY BP: NORMAL MMHG
BH CV STRESS RECOVERY HR: 85 BPM
BH CV STRESS STAGE 1: 1
BH CV STRESS STAGE 2: 2
BH CV STRESS STAGE 3: 3
BUN SERPL-MCNC: 26 MG/DL (ref 8–23)
BUN/CREAT SERPL: 14.5 (ref 7–25)
CALCIUM SPEC-SCNC: 9 MG/DL (ref 8.6–10.5)
CHLORIDE SERPL-SCNC: 105 MMOL/L (ref 98–107)
CO2 SERPL-SCNC: 23 MMOL/L (ref 22–29)
CREAT SERPL-MCNC: 1.79 MG/DL (ref 0.76–1.27)
DEPRECATED RDW RBC AUTO: 45.5 FL (ref 37–54)
EGFRCR SERPLBLD CKD-EPI 2021: 37.4 ML/MIN/1.73
EOSINOPHIL # BLD AUTO: 0.67 10*3/MM3 (ref 0–0.4)
EOSINOPHIL NFR BLD AUTO: 7.9 % (ref 0.3–6.2)
ERYTHROCYTE [DISTWIDTH] IN BLOOD BY AUTOMATED COUNT: 12.8 % (ref 12.3–15.4)
GLUCOSE SERPL-MCNC: 96 MG/DL (ref 65–99)
HCT VFR BLD AUTO: 37.5 % (ref 37.5–51)
HGB BLD-MCNC: 12.2 G/DL (ref 13–17.7)
IMM GRANULOCYTES # BLD AUTO: 0.03 10*3/MM3 (ref 0–0.05)
IMM GRANULOCYTES NFR BLD AUTO: 0.4 % (ref 0–0.5)
LYMPHOCYTES # BLD AUTO: 1.76 10*3/MM3 (ref 0.7–3.1)
LYMPHOCYTES NFR BLD AUTO: 20.7 % (ref 19.6–45.3)
MAXIMAL PREDICTED HEART RATE: 138 BPM
MCH RBC QN AUTO: 31.7 PG (ref 26.6–33)
MCHC RBC AUTO-ENTMCNC: 32.5 G/DL (ref 31.5–35.7)
MCV RBC AUTO: 97.4 FL (ref 79–97)
MONOCYTES # BLD AUTO: 0.87 10*3/MM3 (ref 0.1–0.9)
MONOCYTES NFR BLD AUTO: 10.2 % (ref 5–12)
NEUTROPHILS NFR BLD AUTO: 5.13 10*3/MM3 (ref 1.7–7)
NEUTROPHILS NFR BLD AUTO: 60.3 % (ref 42.7–76)
NRBC BLD AUTO-RTO: 0 /100 WBC (ref 0–0.2)
PERCENT MAX PREDICTED HR: 92.03 %
PLATELET # BLD AUTO: 251 10*3/MM3 (ref 140–450)
PMV BLD AUTO: 10.7 FL (ref 6–12)
POTASSIUM SERPL-SCNC: 3.8 MMOL/L (ref 3.5–5.2)
RBC # BLD AUTO: 3.85 10*6/MM3 (ref 4.14–5.8)
SODIUM SERPL-SCNC: 138 MMOL/L (ref 136–145)
STRESS BASELINE BP: NORMAL MMHG
STRESS BASELINE HR: 65 BPM
STRESS PERCENT HR: 108 %
STRESS POST EXERCISE DUR MIN: 8 MIN
STRESS POST EXERCISE DUR SEC: 36 SEC
STRESS POST PEAK BP: NORMAL MMHG
STRESS POST PEAK HR: 127 BPM
STRESS TARGET HR: 117 BPM
WBC NRBC COR # BLD: 8.5 10*3/MM3 (ref 3.4–10.8)

## 2023-10-19 PROCEDURE — 80048 BASIC METABOLIC PNL TOTAL CA: CPT | Performed by: STUDENT IN AN ORGANIZED HEALTH CARE EDUCATION/TRAINING PROGRAM

## 2023-10-19 PROCEDURE — 93018 CV STRESS TEST I&R ONLY: CPT | Performed by: INTERNAL MEDICINE

## 2023-10-19 PROCEDURE — 25010000002 DOBUTAMINE 250 MG/20ML SOLUTION: Performed by: INTERNAL MEDICINE

## 2023-10-19 PROCEDURE — 85025 COMPLETE CBC W/AUTO DIFF WBC: CPT | Performed by: STUDENT IN AN ORGANIZED HEALTH CARE EDUCATION/TRAINING PROGRAM

## 2023-10-19 PROCEDURE — 93350 STRESS TTE ONLY: CPT

## 2023-10-19 PROCEDURE — 25010000002 ATROPINE SULFATE: Performed by: INTERNAL MEDICINE

## 2023-10-19 PROCEDURE — G0378 HOSPITAL OBSERVATION PER HR: HCPCS

## 2023-10-19 PROCEDURE — 93350 STRESS TTE ONLY: CPT | Performed by: INTERNAL MEDICINE

## 2023-10-19 PROCEDURE — 93017 CV STRESS TEST TRACING ONLY: CPT

## 2023-10-19 PROCEDURE — 93352 ADMIN ECG CONTRAST AGENT: CPT | Performed by: INTERNAL MEDICINE

## 2023-10-19 PROCEDURE — 25510000001 PERFLUTREN 6.52 MG/ML SUSPENSION: Performed by: INTERNAL MEDICINE

## 2023-10-19 RX ORDER — DOBUTAMINE HYDROCHLORIDE 100 MG/100ML
10-50 INJECTION INTRAVENOUS CONTINUOUS
Status: DISCONTINUED | OUTPATIENT
Start: 2023-10-19 | End: 2023-10-19

## 2023-10-19 RX ORDER — METOPROLOL TARTRATE 5 MG/5ML
5 INJECTION INTRAVENOUS ONCE
Status: DISCONTINUED | OUTPATIENT
Start: 2023-10-19 | End: 2023-10-19

## 2023-10-19 RX ADMIN — Medication 5000 UNITS: at 13:00

## 2023-10-19 RX ADMIN — ATROPINE SULFATE 0.3 MG: 0.1 INJECTION INTRAVENOUS at 09:14

## 2023-10-19 RX ADMIN — DOBUTAMINE 10 MCG/KG/MIN: 12.5 INJECTION, SOLUTION, CONCENTRATE INTRAVENOUS at 08:52

## 2023-10-19 RX ADMIN — Medication 10 ML: at 08:07

## 2023-10-19 RX ADMIN — TAMSULOSIN HYDROCHLORIDE 0.4 MG: 0.4 CAPSULE ORAL at 08:06

## 2023-10-19 RX ADMIN — PERFLUTREN 8.48 MG: 6.52 INJECTION, SUSPENSION INTRAVENOUS at 08:52

## 2023-10-19 RX ADMIN — TRAMADOL HYDROCHLORIDE 50 MG: 50 TABLET ORAL at 08:06

## 2023-10-19 RX ADMIN — METOPROLOL TARTRATE 75 MG: 50 TABLET, FILM COATED ORAL at 09:38

## 2023-10-19 RX ADMIN — APIXABAN 2.5 MG: 2.5 TABLET, FILM COATED ORAL at 08:06

## 2023-10-19 RX ADMIN — DILTIAZEM HYDROCHLORIDE 240 MG: 240 CAPSULE, EXTENDED RELEASE ORAL at 08:06

## 2023-10-19 NOTE — PLAN OF CARE
Goal Outcome Evaluation:  Plan of Care Reviewed With: patient        Progress: no change  Outcome Evaluation: vss sr 62-82. A&Ox4. Npo at midnight, plans for stress test today. No c/o chest pain. Rested well. Up adlib

## 2023-10-19 NOTE — DISCHARGE SUMMARY
[facility] Medicine Services  DISCHARGE SUMMARY       Date of Admission: 10/18/2023  Date of Discharge:  10/19/2023  Primary Care Physician: Kaleb Villavicencio MD    Presenting Problem/History of Present Illness:  Adapted from H&P from 10/18/23 by Kirit Breen MD   83-year-old male with history of atrial fibrillation, hypertension, hyperlipidemia, GERD, and arthritis who presented to the emergency room due to chest pain.  Patient states pain is located in the middle of his chest.  Describes as pressure-like pain without radiation.  Patient reports moderate pain quality.  No exacerbating or relieving factors.  Reports associated shortness of breath.  Patient had been to his doctor's office earlier today and was advised to go to the emergency room due to elevated blood pressure and chest pain symptoms.  Patient does have chronic shortness of breath which he attributes to atrial fibrillation.  Initial EKG was sinus bradycardia.  At bedtime troponin T elevated at 23 and 24.  Creatinine was 1.52.  UA significant for trace ketones and blood.  Chest x-ray showed New infiltrative opacity at the right cardiophrenic angle though the most recent chest x-ray is from 14 years ago.   Patient admits to drinking 4-5 beers every day.    Final Discharge Diagnoses:  Active Hospital Problems    Diagnosis     **Unstable angina     CKD (chronic kidney disease) stage 3, GFR 30-59 ml/min     Hyperlipidemia     Atrial fibrillation     Hypertension     Low back pain        Consults: none     Procedures Performed: none     Pertinent Test Results:   Results for orders placed during the hospital encounter of 10/18/23    Adult Stress Echo W/ Cont or Stress Agent if Necessary Per Protocol    Interpretation Summary    Normal stress echo with no significant echocardiographic evidence for myocardial ischemia consistent with a low risk study for myocardial ischemia.    Electrocardiographically negative for  ischemia.      Imaging Results (All)       Procedure Component Value Units Date/Time    XR Chest 1 View [[id number]] Collected: 10/18/23 1346     Updated: 10/18/23 1400    Narrative:      EXAMINATION: XR CHEST 1 VW- 10/18/2023 1:46 PM CDT     HISTORY: chest pain.     REPORT: A frontal view of the chest was obtained.     COMPARISON: Chest x-rays 1/15/2009.     The lungs are hypoaerated, there is an opacity at the right  cardiophrenic angle with a convex margin which was not present on the  remote chest x-ray, possibly with some associated bronchograms. No focal  infiltrate is seen in the left lung. There is borderline cardiomegaly  without evidence of overt CHF. No pneumothorax or pleural effusion is  identified. The osseous structures and upper abdomen are unremarkable.       Impression:      New infiltrative opacity at the right cardiophrenic angle  though the most recent chest x-ray is from 14 years ago. This could be  due to atelectasis, pneumonia, a pericardial cyst or less likely an  underlying mass. Follow-up chest x-rays are recommended within 3 to 4  weeks.     This report was signed and finalized on 10/18/2023 1:56 PM CDT by Dr. Silvestre Jarrell MD.             LAB RESULTS:      Lab 10/19/23  0401 10/18/23  1554 10/18/23  1345   WBC 8.50  --  7.93   HEMOGLOBIN 12.2*  --  13.0   HEMATOCRIT 37.5  --  39.9   PLATELETS 251  --  251   NEUTROS ABS 5.13  --  4.32   IMMATURE GRANS (ABS) 0.03  --  0.03   LYMPHS ABS 1.76  --  1.92   MONOS ABS 0.87  --  0.91*   EOS ABS 0.67*  --  0.71*   MCV 97.4*  --  95.7   LACTATE  --  0.9  --    PROTIME  --   --  15.6*   APTT  --   --  33.7         Lab 10/19/23  0401 10/18/23  1345   SODIUM 138 139   POTASSIUM 3.8 4.6   CHLORIDE 105 104   CO2 23.0 26.0   ANION GAP 10.0 9.0   BUN 26* 19   CREATININE 1.79* 1.52*   EGFR 37.4* 45.5*   GLUCOSE 96 94   CALCIUM 9.0 9.3         Lab 10/18/23  1345   TOTAL PROTEIN 7.3   ALBUMIN 4.3   GLOBULIN 3.0   ALT (SGPT) 9   AST (SGOT) 20   BILIRUBIN  "0.3   ALK PHOS 144*         Lab 10/18/23  1822 10/18/23  1554 10/18/23  1345   PROBNP  --   --  416.8   HSTROP T 24* 24* 23*   PROTIME  --   --  15.6*   INR  --   --  1.23*                 Brief Urine Lab Results       None          Microbiology Results (last 10 days)       ** No results found for the last 240 hours. **            Hospital Course: 83-year-old male with history of atrial fibrillation, hypertension, hyperlipidemia, CKD, GERD, and arthritis who admitted due to chest pain.  Patient had a stress echo done which came back normal with a low risk for myocardial ischemia.  Patient advised on monitoring kidney function and discussing with PCP.  Patient is taking both Lopid and Zocor however Zocor was discontinued at discharge.  Patient meets 2 of 3 criteria (age and creatinine) for Eliquis dose reduction.  Given patient's renal disease and age, Eliquis was decreased from 5 mg to 2.5 mg. Patient will follow up with PCP and cardiology.       Physical Exam on Discharge:  /76 (BP Location: Right arm, Patient Position: Sitting)   Pulse 63   Temp 97.5 °F (36.4 °C) (Oral)   Resp 18   Ht 182.9 cm (72\")   Wt 78.9 kg (174 lb)   SpO2 96%   BMI 23.60 kg/m²   Physical Exam  Vitals and nursing note reviewed.   Constitutional:       General: He is not in acute distress.     Appearance: Normal appearance. He is not diaphoretic.   HENT:      Head: Normocephalic and atraumatic.      Right Ear: External ear normal.      Left Ear: External ear normal.   Eyes:      General: No scleral icterus.     Extraocular Movements: Extraocular movements intact.      Conjunctiva/sclera: Conjunctivae normal.   Cardiovascular:      Rate and Rhythm: Normal rate and regular rhythm.      Heart sounds: Normal heart sounds.   Pulmonary:      Effort: Pulmonary effort is normal. No respiratory distress.      Breath sounds: Normal breath sounds.   Chest:      Chest wall: No tenderness.   Abdominal:      General: Bowel sounds are normal.    "   Palpations: Abdomen is soft.      Tenderness: There is no abdominal tenderness.   Musculoskeletal:         General: No swelling or tenderness.      Cervical back: Normal range of motion and neck supple.      Right lower leg: No edema.      Left lower leg: No edema.   Skin:     General: Skin is warm and dry.      Capillary Refill: Capillary refill takes less than 2 seconds.      Findings: No erythema or rash.   Neurological:      General: No focal deficit present.      Mental Status: He is alert and oriented to person, place, and time.      Motor: No weakness.   Psychiatric:         Behavior: Behavior normal.           Condition on Discharge: Stable and improving     Discharge Disposition:  Home or Self Care    Discharge Medications:     Discharge Medications        Changes to Medications        Instructions Start Date   apixaban 2.5 MG tablet tablet  Commonly known as: ELIQUDOREEN  What changed:   medication strength  how much to take   2.5 mg, Oral, Every 12 Hours Scheduled             Continue These Medications        Instructions Start Date   acetaminophen 500 MG tablet  Commonly known as: TYLENOL   1,000 mg, Oral, Every 6 Hours PRN      dilTIAZem  MG 24 hr capsule  Commonly known as: CARDIZEM CD   240 mg, Oral, Daily      gemfibrozil 600 MG tablet  Commonly known as: LOPID   600 mg, Oral, Daily      Metoprolol Tartrate 75 MG tablet   75 mg, Oral, 2 Times Daily      omeprazole 20 MG capsule  Commonly known as: priLOSEC   20 mg, Oral, Daily      tamsulosin 0.4 MG capsule 24 hr capsule  Commonly known as: FLOMAX   0.4 mg, Oral, Daily      traMADol 50 MG tablet  Commonly known as: ULTRAM   1 tablet, Oral, Every 8 Hours PRN      vitamin D3 125 MCG (5000 UT) capsule capsule   5,000 Units, Oral, Daily             Stop These Medications      simvastatin 10 MG tablet  Commonly known as: ZOCOR                Discharge Diet:   Diet Instructions       Diet: Cardiac Diets; Healthy Heart (2-3 Na+); Regular Texture (IDDSI  7); Thin (IDDSI 0)      Discharge Diet: Cardiac Diets    Cardiac Diet: Healthy Heart (2-3 Na+)    Texture: Regular Texture (IDDSI 7)    Fluid Consistency: Thin (IDDSI 0)            Activity at Discharge:   Activity Instructions       Activity as Tolerated              Follow-up Appointments:   Future Appointments   Date Time Provider Department Center   2/15/2024 10:30 AM Jeremie Lala MD [facility]       Test Results Pending at Discharge: none    Electronically signed by Kirit Breen MD, 10/19/23, 17:22 CDT.    Time: 32 minutes.

## 2023-10-23 LAB
QT INTERVAL: 448 MS
QTC INTERVAL: 420 MS

## 2023-10-25 ENCOUNTER — TELEPHONE (OUTPATIENT)
Dept: CARDIOLOGY | Facility: CLINIC | Age: 83
End: 2023-10-25

## 2023-10-25 NOTE — TELEPHONE ENCOUNTER
Caller: Fabian Velez Sr.    Relationship to patient: Self    Best call back number: [phone]    Chief complaint: PATIENT WOULD LIKE TO BE SEEN SOONER THAN 1.2.24    Type of visit: HOSPITAL FOLLOW UP    Requested date: NOVEMBER 2023     If rescheduling, when is the original appointment: 1.2.24     Additional notes:PLEASE CALL PATIENT FOR AN EARLIER APPOINTMENT.

## 2023-11-16 ENCOUNTER — TELEPHONE (OUTPATIENT)
Dept: UROLOGY | Facility: CLINIC | Age: 83
End: 2023-11-16

## 2023-11-16 NOTE — TELEPHONE ENCOUNTER
[facility] received a fax that requires your attention. The document has been indexed to the patient’s chart for your review.      Reason for sending: RCVD COMP. METABOLIC PANEL. REQUEST PROVIDER REVIEW.    Documents Description: COMP. METABOLIC PANEL_ [facility] [place]_ 11-16-23    Name of Sender: PEDRO BEGUM [facility]    Date Indexed: 11/16/23    Notes (if needed):

## 2024-01-02 DIAGNOSIS — K21.9 GASTROESOPHAGEAL REFLUX DISEASE: ICD-10-CM

## 2024-01-02 RX ORDER — OMEPRAZOLE 20 MG/1
20 CAPSULE, DELAYED RELEASE ORAL DAILY
Qty: 90 CAPSULE | Refills: 0 | Status: SHIPPED | OUTPATIENT
Start: 2024-01-02

## 2024-02-14 ENCOUNTER — TELEPHONE (OUTPATIENT)
Dept: CARDIOLOGY | Facility: CLINIC | Age: 84
End: 2024-02-14
Payer: MEDICARE

## 2024-02-16 ENCOUNTER — OFFICE VISIT (OUTPATIENT)
Dept: CARDIOLOGY | Facility: CLINIC | Age: 84
End: 2024-02-16
Payer: MEDICARE

## 2024-02-16 VITALS
WEIGHT: 173 LBS | DIASTOLIC BLOOD PRESSURE: 77 MMHG | SYSTOLIC BLOOD PRESSURE: 130 MMHG | HEIGHT: 72 IN | OXYGEN SATURATION: 98 % | BODY MASS INDEX: 23.43 KG/M2 | HEART RATE: 60 BPM

## 2024-02-16 DIAGNOSIS — G89.29 CHRONIC CHEST PAIN WITH HIGH RISK FOR CAD: ICD-10-CM

## 2024-02-16 DIAGNOSIS — I47.10 PSVT (PAROXYSMAL SUPRAVENTRICULAR TACHYCARDIA): ICD-10-CM

## 2024-02-16 DIAGNOSIS — Z79.01 CHRONIC ANTICOAGULATION: ICD-10-CM

## 2024-02-16 DIAGNOSIS — E78.2 MIXED HYPERLIPIDEMIA: ICD-10-CM

## 2024-02-16 DIAGNOSIS — Z91.89 CHRONIC CHEST PAIN WITH HIGH RISK FOR CAD: ICD-10-CM

## 2024-02-16 DIAGNOSIS — I10 PRIMARY HYPERTENSION: ICD-10-CM

## 2024-02-16 DIAGNOSIS — I48.0 PAROXYSMAL ATRIAL FIBRILLATION: Primary | ICD-10-CM

## 2024-02-16 DIAGNOSIS — R07.9 CHRONIC CHEST PAIN WITH HIGH RISK FOR CAD: ICD-10-CM

## 2024-02-16 PROBLEM — I20.0 UNSTABLE ANGINA: Status: RESOLVED | Noted: 2023-10-18 | Resolved: 2024-02-16

## 2024-02-16 PROCEDURE — 1159F MED LIST DOCD IN RCRD: CPT | Performed by: INTERNAL MEDICINE

## 2024-02-16 PROCEDURE — 93000 ELECTROCARDIOGRAM COMPLETE: CPT | Performed by: INTERNAL MEDICINE

## 2024-02-16 PROCEDURE — 3075F SYST BP GE 130 - 139MM HG: CPT | Performed by: INTERNAL MEDICINE

## 2024-02-16 PROCEDURE — 99214 OFFICE O/P EST MOD 30 MIN: CPT | Performed by: INTERNAL MEDICINE

## 2024-02-16 PROCEDURE — 3078F DIAST BP <80 MM HG: CPT | Performed by: INTERNAL MEDICINE

## 2024-02-16 PROCEDURE — 1160F RVW MEDS BY RX/DR IN RCRD: CPT | Performed by: INTERNAL MEDICINE

## 2024-02-16 RX ORDER — BACLOFEN 10 MG/1
1 TABLET ORAL
COMMUNITY

## 2024-02-16 RX ORDER — RANOLAZINE 500 MG/1
500 TABLET, EXTENDED RELEASE ORAL 2 TIMES DAILY
Qty: 60 TABLET | Refills: 11 | Status: SHIPPED | OUTPATIENT
Start: 2024-02-16

## 2024-02-16 NOTE — PROGRESS NOTES
"    Subjective:     Encounter Date: 02/16/24      Patient ID: Fabian Velez Sr. is a 83 y.o. male.    Chief Complaint: Follow-up PAF, SVT, hypertension    History of Present Illness      83-year-old returns for follow-up of paroxysmal atrial fibrillation.  By way of review, I first saw him in '20 for new/recent diagnosis of paroxysmal atrial fibrillation.  He was in [facility] with a family member when he started noticing a funny feeling that he described as lightheadedness.  He was noted to be in atrial fibrillation, with rapid ventricular response and after a brief overnight admission there he was discharged home on Eliquis and diltiazem.  He also has limiting back pain that he was needing a series of back injections for, with advice regarding anticoagulation around those.  He wore a 14-day Zio patch in May 2020 to see if he was having any other occult atrial fibrillation, and he was not.  We recommended that he remain on Eliquis, but to take aspirin 81 mg daily if Eliquis had to be held for his injections.  He did well through the next couple follow-up visits and then when he saw me in the office in 2021, he noted some palpitations described as episodes lasting 10 to 15 minutes at a time usually twice per month but had some episodes lasting up to 4 to 5 hours.  A Zio patch was placed which showed PACs and short runs of SVT, but no atrial fibrillation.  His Lopressor dose was increased.    He was doing well on his last visit here in February, but then ended up being admitted to the hospital for chest pain on 10/18/2023.  Serial troponins did not exhibit any delta, and a stress echocardiogram was performed that was interpreted as low risk for ischemia, so he was discharged home the following day.  He returns today for routine follow-up.    He continues having intermittent chest pain he describes some pressure in his chest, \"like a rush,\" when he was up and walking in his house.  " Lasted 15-20 minutes. No radiation. No change with activity - no identifiable aggravating or alleviating factor. Does have associated dyspnea.  Occurs about 2-3 times per week.  Frequency and intensity are not changing.      The following portions of the patient's history were reviewed and updated as appropriate: allergies, current medications, past family history, past medical history, past social history, past surgical history and problem list.    Review of Systems   Constitutional: Negative for malaise/fatigue.   Cardiovascular:  Positive for chest pain and dyspnea on exertion. Negative for claudication, leg swelling, near-syncope, orthopnea, palpitations, paroxysmal nocturnal dyspnea and syncope.   Respiratory:  Positive for shortness of breath. Negative for cough.    Hematologic/Lymphatic: Does not bruise/bleed easily.   Musculoskeletal:  Negative for falls.   Gastrointestinal:  Negative for bloating.   Neurological:  Negative for dizziness, light-headedness and weakness.       Allergies   Allergen Reactions    Contrast Dye (Echo Or Unknown Ct/Mr) Other (See Comments)     flush       Current Outpatient Medications:     acetaminophen (TYLENOL) 500 MG tablet, Take 2 tablets by mouth Every 6 (Six) Hours As Needed for Mild Pain., Disp: , Rfl:     apixaban (ELIQUIS) 2.5 MG tablet tablet, Take 1 tablet by mouth Every 12 (Twelve) Hours. Indications: Atrial Fibrillation, Disp: 60 tablet, Rfl: 0    baclofen (LIORESAL) 10 MG tablet, Take 1 tablet by mouth every night at bedtime., Disp: , Rfl:     Cholecalciferol (VITAMIN D3) 125 MCG (5000 UT) capsule capsule, Take 1 capsule by mouth Daily., Disp: , Rfl:     gemfibrozil (LOPID) 600 MG tablet, Take 1 tablet by mouth Daily., Disp: , Rfl:     omeprazole (priLOSEC) 20 MG capsule, Take 1 capsule by mouth Daily., Disp: 90 capsule, Rfl: 0    tamsulosin (FLOMAX) 0.4 MG capsule 24 hr capsule, Take 1 capsule by mouth Daily., Disp: 90 capsule, Rfl: 3    traMADol (ULTRAM) 50 MG  "tablet, Take 1 tablet by mouth Every 8 (Eight) Hours As Needed for Moderate Pain., Disp: , Rfl:     dilTIAZem CD (CARDIZEM CD) 240 MG 24 hr capsule, Take 1 capsule by mouth Daily., Disp: , Rfl:     Metoprolol Tartrate 75 MG tablet, Take 75 mg by mouth 2 (Two) Times a Day. Takes 1/2 tablet twice daily, Disp: , Rfl:          Objective:    /77   Pulse 60   Ht 182.9 cm (72\")   Wt 78.5 kg (173 lb)   SpO2 98%   BMI 23.46 kg/m²        Vitals and nursing note reviewed.   Constitutional:       General: Not in acute distress.     Appearance: Not in distress.   Neck:      Vascular: No JVD or JVR. JVD normal.   Pulmonary:      Effort: Pulmonary effort is normal.      Breath sounds: Normal breath sounds.   Cardiovascular:      Normal rate. Regular rhythm.      Murmurs: There is no murmur.   Edema:     Peripheral edema absent.   Skin:     General: Skin is warm and dry.   Neurological:      Mental Status: Alert, oriented to person, place, and time and oriented to person, place and time.       Lab Review:   Lab Results   Component Value Date    GLUCOSE 96 10/19/2023    BUN 26 (H) 10/19/2023    CREATININE 1.79 (H) 10/19/2023    EGFR 37.4 (L) 10/19/2023    BCR 14.5 10/19/2023    K 3.8 10/19/2023    CO2 23.0 10/19/2023    CALCIUM 9.0 10/19/2023    ALBUMIN 4.3 10/18/2023    AST 20 10/18/2023    ALT 9 10/18/2023             ECG 12 Lead    Date/Time: 2/16/2024 1:22 PM  Performed by: Jeremie Lala MD    Authorized by: Jeremie Lala MD  Comparison: compared with previous ECG from 10/18/2023  Similar to previous ECG  Rhythm: sinus rhythm  BPM: 60    Clinical impression: normal ECG          Echo report previously reviewed:  1/21/2020.  LVEF 68% with mild LVH.  Left and right atria both said to be normal in size.  Normal size and function of the RV.  No significant valvular pathology noted.    5/2020 14 day monitor -   A relatively benign monitor study.  Predominant rhythm is normal sinus rhythm.  Numerous runs of short, " nonsustained supraventricular tachycardia.  Two patient reported episodes of symptoms, including lightheadedness, and palpitations, corresponded with frequent bursts of short, nonsustained supraventricular tachycardia.  Occasional (3.4%) isolated PACs.    2/2021 14 day monitor :    An abnormal monitor study.  Predominant rhythm was normal sinus rhythm.  Patient reported symptoms on 5 occasions. When palpitations were reported, it seemed to correlate with PACs and/or short runs of SVT. When chest pain was reported alone (i.e. not with palpitations), it seemed to only be at times of normal sinus rhythm.  No sustained arrhythmias, but there were numerous short episodes of SVT (longest was 18 seconds).  No evidence of atrial fibrillation or flutter.  Occasional (2.6%) PACs.  Results for orders placed during the hospital encounter of 10/18/23    Adult Stress Echo W/ Cont or Stress Agent if Necessary Per Protocol    Interpretation Summary    Normal stress echo with no significant echocardiographic evidence for myocardial ischemia consistent with a low risk study for myocardial ischemia.    Electrocardiographically negative for ischemia.      Assessment:      Problem List Items Addressed This Visit (all established and stable, except for otherwise noted)         Cardiac and Vasculature    Atrial fibrillation - Primary    Overview     Diagnosed Jan '20 - was symptomatic at time of diagnosis with AF/RVR (rate 150s) - was light-headed    CHADS-VASc Risk Assessment              3       Total Score        1 Hypertension    2 Age >/= 75        Criteria that do not apply:    CHF    DM    PRIOR STROKE/TIA/THROMBO    Vascular Disease    Age 65-74    Sex: Female                   Relevant Medications    ranolazine (Ranexa) 500 MG 12 hr tablet    Other Relevant Orders    ECG 12 Lead    Hypertension    Hyperlipidemia    PSVT (paroxysmal supraventricular tachycardia)    Relevant Medications    ranolazine (Ranexa) 500 MG 12 hr tablet     Other Relevant Orders    ECG 12 Lead       Coag and Thromboembolic    Chronic anticoagulation     Other Visit Diagnoses       Chronic chest pain with high risk for CAD: New problem addressed today, further workup and medication initiation planned (as per below)                  Plan:     1.  Paroxysmal atrial fibrillation: Established problem, stable.  Maintaining normal sinus rhythm  -Continue Eliquis 5 mg twice daily for anticoagulation (replace this with aspirin 81 mg daily when receiving back injections, then resume Eliquis and DC aspirin post procedure when safe from a bleeding standpoint)  -Continue beta-blocker and diltiazem 240 mg daily for rate control with recurrences of atrial fibrillation     2.  Essential hypertension: remains well controlled.  -continue lopressor and cardizem  -We discussed goal blood pressure (average <140/<90)     3.  Hyperlipidemia: no recent labs available  -Continue statin-management per PCP    4.  PSVT: stable; as previously corresponded with symptomatic complaints on different outpatient rhythm monitoring studies.    -Continue BB and calcium channel blocker for suppression    5.  Chronic chest pain with high risk for CAD: Continues to have chronic, stable symptoms as outlined above in HPI.  He ruled out for MI in the emergency department for the symptoms, and had a low risk stress test in October 2023.  -Will provide an empiric attempt at symptom management with Ranexa 500 mg twice daily, and plan for follow-up in 4 weeks to see if symptoms have changed or improved at all in response to this.  If some improvement is noted, then will increase the dose to 1000 mg twice daily and continue close surveillance.  If symptoms continue to improve but are still present, will then recommend cardiac catheterization.    F/u 4 weeks as described above, or sooner with any new or worsening symptoms      Jeremie Lala MD  13:20 CST  02/16/24

## 2024-03-16 DIAGNOSIS — K21.9 GASTROESOPHAGEAL REFLUX DISEASE: ICD-10-CM

## 2024-03-18 RX ORDER — OMEPRAZOLE 20 MG/1
CAPSULE, DELAYED RELEASE ORAL
Qty: 90 CAPSULE | Refills: 3 | OUTPATIENT
Start: 2024-03-18

## 2024-03-20 ENCOUNTER — OFFICE VISIT (OUTPATIENT)
Dept: CARDIOLOGY | Facility: CLINIC | Age: 84
End: 2024-03-20
Payer: MEDICARE

## 2024-03-20 VITALS
SYSTOLIC BLOOD PRESSURE: 130 MMHG | HEART RATE: 59 BPM | WEIGHT: 172 LBS | BODY MASS INDEX: 23.3 KG/M2 | DIASTOLIC BLOOD PRESSURE: 68 MMHG | HEIGHT: 72 IN | OXYGEN SATURATION: 96 %

## 2024-03-20 DIAGNOSIS — E78.2 MIXED HYPERLIPIDEMIA: ICD-10-CM

## 2024-03-20 DIAGNOSIS — R07.2 PRECORDIAL PAIN: Primary | ICD-10-CM

## 2024-03-20 DIAGNOSIS — I48.0 PAROXYSMAL ATRIAL FIBRILLATION: ICD-10-CM

## 2024-03-20 DIAGNOSIS — I10 PRIMARY HYPERTENSION: ICD-10-CM

## 2024-03-20 DIAGNOSIS — I47.10 PSVT (PAROXYSMAL SUPRAVENTRICULAR TACHYCARDIA): ICD-10-CM

## 2024-03-20 PROCEDURE — 1159F MED LIST DOCD IN RCRD: CPT | Performed by: INTERNAL MEDICINE

## 2024-03-20 PROCEDURE — 99214 OFFICE O/P EST MOD 30 MIN: CPT | Performed by: INTERNAL MEDICINE

## 2024-03-20 PROCEDURE — 3078F DIAST BP <80 MM HG: CPT | Performed by: INTERNAL MEDICINE

## 2024-03-20 PROCEDURE — 93000 ELECTROCARDIOGRAM COMPLETE: CPT | Performed by: INTERNAL MEDICINE

## 2024-03-20 PROCEDURE — 3075F SYST BP GE 130 - 139MM HG: CPT | Performed by: INTERNAL MEDICINE

## 2024-03-20 PROCEDURE — 1160F RVW MEDS BY RX/DR IN RCRD: CPT | Performed by: INTERNAL MEDICINE

## 2024-03-20 RX ORDER — RANOLAZINE 1000 MG/1
1000 TABLET, EXTENDED RELEASE ORAL 2 TIMES DAILY
Qty: 180 TABLET | Refills: 3 | Status: SHIPPED | OUTPATIENT
Start: 2024-03-20

## 2024-03-20 NOTE — H&P (VIEW-ONLY)
"     Subjective:     Encounter Date:03/20/2024      Patient ID: Fabian Velez Sr. is a 83 y.o. male.    Chief Complaint: Follow up AFib, SVT, and hypertension.  History of Present Illness    I just saw Mr. Velez here 1 month ago. He was still having chronic chest pain at that time that he described as a pressure sensation in his chest \"like a rush\" when he was walking around the house. It would last for 15-20 minutes. It would not radiate, and did not change with activity, and nothing particular made it any better. There was some associated dyspnea, and it was occurring about 2-3 times a week. Intensity, and frequency were not changing. He had been to the emergency department for these symptoms, and ruled out for MI, and also had a low-risk stress test in 10/2023. Therefore, at the visit here on 02/16/2024, hearing the aforementioned symptoms, provided an empiric attempt at symptom control with Ranexa 500 mg twice daily, and he returns today for further discussion of this.     Otherwise, by way of review, he remains on Eliquis for stroke protection, given paroxysmal atrial fibrillation, as well as Cardizem, and beta blocker for rate control. Blood pressure was well controlled at last visit, so no changes were made there.     He has also had paroxysmal SVT demonstrated numerous times on previous studies that has correlated with symptoms of palpitations.    Today, he reports an improvement in chest pain since starting Ranexa 500 mg twice daily. He occasionally experiences \"a little rush,\" which he likens to the experience of nearly falling, and states has occurred his entire life. This lasts for 5-10 minutes. He denies that this sensation is the chest pain he described at his last visit. The chest pain he reported at his last visit involved \"tight\" or \"pressure\" in his chest, with associated dyspnea. He could complete activities when those symptoms occurred though experienced difficulty. Since starting Ranexa, the " tightness/pressure that previously occurred 2-3 times per week has not occurred at all. He denies any episodes of dyspnea in the last 1 month. He denies any side effects with Ranexa.    The patient takes metoprolol 75 mg 0.5 tablet twice daily. He requests a change to his metoprolol dose so the tablets do not need to be halved.    There is uncertainty regarding his medications for cholesterol control.      The following portions of the patient's history were reviewed and updated as appropriate: allergies, current medications, past family history, past medical history, past social history, past surgical history, and problem list.    Review of Systems   Constitutional: Negative for malaise/fatigue.   Cardiovascular:  Negative for chest pain, claudication, dyspnea on exertion, leg swelling, near-syncope, orthopnea, palpitations, paroxysmal nocturnal dyspnea and syncope.   Respiratory:  Negative for shortness of breath.    Hematologic/Lymphatic: Does not bruise/bleed easily.           Current Outpatient Medications:     acetaminophen (TYLENOL) 500 MG tablet, Take 2 tablets by mouth Every 6 (Six) Hours As Needed for Mild Pain., Disp: , Rfl:     apixaban (ELIQUIS) 2.5 MG tablet tablet, Take 1 tablet by mouth Every 12 (Twelve) Hours. Indications: Atrial Fibrillation, Disp: 60 tablet, Rfl: 0    baclofen (LIORESAL) 10 MG tablet, Take 1 tablet by mouth every night at bedtime., Disp: , Rfl:     Cholecalciferol (VITAMIN D3) 125 MCG (5000 UT) capsule capsule, Take 1 capsule by mouth Daily., Disp: , Rfl:     dilTIAZem CD (CARDIZEM CD) 240 MG 24 hr capsule, Take 1 capsule by mouth Daily., Disp: , Rfl:     gemfibrozil (LOPID) 600 MG tablet, Take 1 tablet by mouth Daily., Disp: , Rfl:     omeprazole (priLOSEC) 20 MG capsule, Take 1 capsule by mouth Daily., Disp: 90 capsule, Rfl: 0    ranolazine (Ranexa) 1000 MG 12 hr tablet, Take 1 tablet by mouth 2 (Two) Times a Day., Disp: 180 tablet, Rfl: 3    tamsulosin (FLOMAX) 0.4 MG capsule  24 hr capsule, Take 1 capsule by mouth Daily., Disp: 90 capsule, Rfl: 3    traMADol (ULTRAM) 50 MG tablet, Take 1 tablet by mouth Every 8 (Eight) Hours As Needed for Moderate Pain., Disp: , Rfl:     metoprolol tartrate (LOPRESSOR) 25 MG tablet, Take 1 tablet by mouth 2 (Two) Times a Day., Disp: 180 tablet, Rfl: 3       Objective:      Vitals:    03/20/24 1456   BP: 130/68   Pulse: 59   SpO2: 96%     Vitals and nursing note reviewed.   Constitutional:       General: Not in acute distress.     Appearance: Not in distress.   Neck:      Vascular: No JVD or JVR. JVD normal.   Pulmonary:      Effort: Pulmonary effort is normal.      Breath sounds: Normal breath sounds.   Cardiovascular:      Normal rate. Regular rhythm.      Murmurs: There is no murmur.      No gallop.  No rub.   Pulses:     Intact distal pulses.   Edema:     Peripheral edema absent.   Skin:     General: Skin is warm and dry.   Neurological:      Mental Status: Alert, oriented to person, place, and time and oriented to person, place and time.         Lab Review:         ECG 12 Lead    Date/Time: 3/20/2024 3:12 PM  Performed by: Jeremie Lala MD    Authorized by: Jeremie Lala MD  Comparison: compared with previous ECG from 2/16/2024  Comparison to previous ECG: no change  Rhythm: sinus bradycardia  BPM: 59    Clinical impression: normal ECG            Assessment/Plan:     Problem List Items Addressed This Visit (all established, stable except for otherwise noted)         Cardiac and Vasculature    Atrial fibrillation: In sinus rhythm    Overview     Diagnosed Jan '20 - was symptomatic at time of diagnosis with AF/RVR (rate 150s) - was light-headed    CHADS-VASc Risk Assessment              3       Total Score        1 Hypertension    2 Age >/= 75        Criteria that do not apply:    CHF    DM    PRIOR STROKE/TIA/THROMBO    Vascular Disease    Age 65-74    Sex: Female                   Relevant Medications    ranolazine (Ranexa) 1000 MG 12 hr tablet     metoprolol tartrate (LOPRESSOR) 25 MG tablet    Hypertension: Well-controlled    Relevant Medications    metoprolol tartrate (LOPRESSOR) 25 MG tablet    Hyperlipidemia    PSVT (paroxysmal supraventricular tachycardia): Stable, minimal symptoms    Relevant Medications    ranolazine (Ranexa) 1000 MG 12 hr tablet    metoprolol tartrate (LOPRESSOR) 25 MG tablet    Precordial pain - Primary: Now resolved on Ranexa         Recommendations/plans:    The chest discomfort, including tightness and pressure that he was feeling, and described at our last visit, has now resolved on Ranexa 500 mg twice per day. I did advise that he increase the dose up to 1000 mg twice daily since most patients in the studies got maximal benefit on this dose.     Otherwise, he is asking if he can get a different size of pills of metoprolol to avoid having to cut his 75 mg tablet in half. He is taking 0.5 tablet twice daily of 75 mg of metoprolol, effectively 37.5 mg twice daily. His heart rate is in the upper 50s, and his blood pressure is well controlled, so I think he would probably do just fine on the slightly lower dose, and therefore, I have sent in a new prescription, which he can take a whole tablet of 25 mg in the morning and 25 mg at night.     Lastly, he is asking about simvastatin, as there is some confusion there. I asked him to discuss that more with the prescribing providers. I would advocate that he would be on a high-intensity statin for maximal ischemic event reduction, which would include either atorvastatin 40 mg or 80 mg, or rosuvastatin 20 mg or 40 mg. He can discuss this with Ms. Desir to see if his simvastatin should be stopped in favor of one of these. Otherwise, no changes recommended at this time.    We will see him back for followup in 6 months.    Transcribed from ambient dictation for Jeremie Lala MD by Estephania Rasheed.  03/20/24   18:23 CDT    Patient or patient representative verbalized consent to the visit  recording.    I Jeremie Lala MD have personally performed the services described in this document as scribed by the above individual, and it is both accurate and complete.   I have edited each component as needed.    Jeremie Lala MD  3/21/2024  00:11 CDT

## 2024-03-26 ENCOUNTER — TELEPHONE (OUTPATIENT)
Dept: CARDIOLOGY | Facility: CLINIC | Age: 84
End: 2024-03-26
Payer: MEDICARE

## 2024-03-27 ENCOUNTER — TELEPHONE (OUTPATIENT)
Dept: CARDIOLOGY | Facility: CLINIC | Age: 84
End: 2024-03-27

## 2024-03-27 NOTE — TELEPHONE ENCOUNTER
Caller: Fabian Velez Sr.    Relationship: Self    Best call back number: [phone]    Which medication are you concerned about: RANOLAZINE 500 MG     Who prescribed you this medication:     When did you start taking this medication:3/20/24      What are your concerns: PATIENT STATES THAT THE  RANOLAZINE MEDICATION  THAT HE IS SUPPOSED TO TAKE FOR CHEST PAIN MAKES HIM HAVE CHEST PAIN .     How long have you had these concerns: SINCE STARTED TAKING

## 2024-03-28 ENCOUNTER — PREP FOR SURGERY (OUTPATIENT)
Dept: OTHER | Facility: HOSPITAL | Age: 84
End: 2024-03-28
Payer: MEDICARE

## 2024-03-28 DIAGNOSIS — R07.2 PRECORDIAL PAIN: Primary | ICD-10-CM

## 2024-03-28 RX ORDER — ASPIRIN 81 MG/1
81 TABLET ORAL DAILY
OUTPATIENT
Start: 2024-03-29

## 2024-03-28 RX ORDER — SODIUM CHLORIDE 0.9 % (FLUSH) 0.9 %
3 SYRINGE (ML) INJECTION EVERY 12 HOURS SCHEDULED
OUTPATIENT
Start: 2024-03-29

## 2024-03-28 RX ORDER — DIPHENHYDRAMINE HCL 50 MG
50 CAPSULE ORAL
OUTPATIENT
Start: 2024-03-29 | End: 2024-03-29

## 2024-03-28 RX ORDER — DIPHENHYDRAMINE HYDROCHLORIDE 50 MG/ML
50 INJECTION INTRAMUSCULAR; INTRAVENOUS
OUTPATIENT
Start: 2024-03-29 | End: 2024-03-29

## 2024-03-28 RX ORDER — PREDNISONE 50 MG/1
50 TABLET ORAL
OUTPATIENT
Start: 2024-03-29 | End: 2024-03-29

## 2024-03-28 RX ORDER — SODIUM CHLORIDE 9 MG/ML
40 INJECTION, SOLUTION INTRAVENOUS AS NEEDED
OUTPATIENT
Start: 2024-03-28

## 2024-03-28 RX ORDER — PREDNISONE 50 MG/1
50 TABLET ORAL TAKE AS DIRECTED
Qty: 2 TABLET | Refills: 0 | Status: SHIPPED | OUTPATIENT
Start: 2024-03-28

## 2024-03-28 RX ORDER — ASPIRIN 81 MG/1
324 TABLET, CHEWABLE ORAL ONCE
OUTPATIENT
Start: 2024-03-29 | End: 2024-03-29

## 2024-03-28 RX ORDER — SODIUM CHLORIDE 0.9 % (FLUSH) 0.9 %
10 SYRINGE (ML) INJECTION AS NEEDED
OUTPATIENT
Start: 2024-03-28

## 2024-04-03 ENCOUNTER — TELEPHONE (OUTPATIENT)
Dept: CARDIOLOGY | Facility: CLINIC | Age: 84
End: 2024-04-03

## 2024-04-03 NOTE — TELEPHONE ENCOUNTER
Caller: Fabian Velez Sr.    Relationship: Self    Best call back number:  [phone]    What is the best time to reach you: ANYTIME    Who are you requesting to speak with (clinical staff, provider,  specific staff member): CLINICAL         What was the call regarding: PATIENT HAS A PROCEDURE ON 4/15/24 AND NEEDS TO KNOW IF THERE WILL BE COST TO HIM FOR THAT AND WHERE IS HE IS SUPPOSED TO GO THE DAY OF PROCEDURE.     Is it okay if the provider responds through MyChart: YES

## 2024-04-15 ENCOUNTER — HOSPITAL ENCOUNTER (OUTPATIENT)
Facility: HOSPITAL | Age: 84
Discharge: HOME | End: 2024-04-17
Attending: INTERNAL MEDICINE | Admitting: INTERNAL MEDICINE
Payer: MEDICARE

## 2024-04-15 DIAGNOSIS — R07.2 PRECORDIAL PAIN: ICD-10-CM

## 2024-04-15 DIAGNOSIS — Z95.5 S/P DRUG ELUTING CORONARY STENT PLACEMENT: Primary | ICD-10-CM

## 2024-04-15 DIAGNOSIS — I48.0 PAROXYSMAL ATRIAL FIBRILLATION: ICD-10-CM

## 2024-04-15 DIAGNOSIS — I25.118 CORONARY ARTERY DISEASE OF NATIVE ARTERY OF NATIVE HEART WITH STABLE ANGINA PECTORIS: ICD-10-CM

## 2024-04-15 LAB
ACT BLD: 358 SECONDS (ref 82–152)
ANION GAP SERPL CALCULATED.3IONS-SCNC: 13 MMOL/L (ref 5–15)
BUN SERPL-MCNC: 24 MG/DL (ref 8–23)
BUN/CREAT SERPL: 15.3 (ref 7–25)
CALCIUM SPEC-SCNC: 9.6 MG/DL (ref 8.6–10.5)
CHLORIDE SERPL-SCNC: 104 MMOL/L (ref 98–107)
CO2 SERPL-SCNC: 23 MMOL/L (ref 22–29)
CREAT SERPL-MCNC: 1.57 MG/DL (ref 0.76–1.27)
DEPRECATED RDW RBC AUTO: 42.7 FL (ref 37–54)
EGFRCR SERPLBLD CKD-EPI 2021: 43.7 ML/MIN/1.73
ERYTHROCYTE [DISTWIDTH] IN BLOOD BY AUTOMATED COUNT: 12.7 % (ref 12.3–15.4)
GLUCOSE SERPL-MCNC: 143 MG/DL (ref 65–99)
HCT VFR BLD AUTO: 39.2 % (ref 37.5–51)
HGB BLD-MCNC: 13 G/DL (ref 13–17.7)
MCH RBC QN AUTO: 30.8 PG (ref 26.6–33)
MCHC RBC AUTO-ENTMCNC: 33.2 G/DL (ref 31.5–35.7)
MCV RBC AUTO: 92.9 FL (ref 79–97)
PLATELET # BLD AUTO: 298 10*3/MM3 (ref 140–450)
PMV BLD AUTO: 10.2 FL (ref 6–12)
POTASSIUM SERPL-SCNC: 4.3 MMOL/L (ref 3.5–5.2)
RBC # BLD AUTO: 4.22 10*6/MM3 (ref 4.14–5.8)
SODIUM SERPL-SCNC: 140 MMOL/L (ref 136–145)
WBC NRBC COR # BLD AUTO: 10.56 10*3/MM3 (ref 3.4–10.8)

## 2024-04-15 PROCEDURE — C1887 CATHETER, GUIDING: HCPCS | Performed by: INTERNAL MEDICINE

## 2024-04-15 PROCEDURE — C1769 GUIDE WIRE: HCPCS | Performed by: INTERNAL MEDICINE

## 2024-04-15 PROCEDURE — 99152 MOD SED SAME PHYS/QHP 5/>YRS: CPT | Performed by: INTERNAL MEDICINE

## 2024-04-15 PROCEDURE — 63710000001 PREDNISONE PER 5 MG: Performed by: INTERNAL MEDICINE

## 2024-04-15 PROCEDURE — C1874 STENT, COATED/COV W/DEL SYS: HCPCS | Performed by: INTERNAL MEDICINE

## 2024-04-15 PROCEDURE — 25810000003 SODIUM CHLORIDE 0.9 % SOLUTION: Performed by: INTERNAL MEDICINE

## 2024-04-15 PROCEDURE — 85027 COMPLETE CBC AUTOMATED: CPT | Performed by: INTERNAL MEDICINE

## 2024-04-15 PROCEDURE — 25010000002 MIDAZOLAM PER 1 MG: Performed by: INTERNAL MEDICINE

## 2024-04-15 PROCEDURE — 25510000001 IOPAMIDOL 61 % SOLUTION: Performed by: INTERNAL MEDICINE

## 2024-04-15 PROCEDURE — A9270 NON-COVERED ITEM OR SERVICE: HCPCS | Performed by: INTERNAL MEDICINE

## 2024-04-15 PROCEDURE — 25010000002 FENTANYL CITRATE (PF) 50 MCG/ML SOLUTION: Performed by: INTERNAL MEDICINE

## 2024-04-15 PROCEDURE — 85347 COAGULATION TIME ACTIVATED: CPT

## 2024-04-15 PROCEDURE — C1725 CATH, TRANSLUMIN NON-LASER: HCPCS | Performed by: INTERNAL MEDICINE

## 2024-04-15 PROCEDURE — 63710000001 APIXABAN 2.5 MG TABLET: Performed by: INTERNAL MEDICINE

## 2024-04-15 PROCEDURE — 63710000001 DIPHENHYDRAMINE PER 50 MG: Performed by: INTERNAL MEDICINE

## 2024-04-15 PROCEDURE — 63710000001 PREDNISONE PER 1 MG: Performed by: INTERNAL MEDICINE

## 2024-04-15 PROCEDURE — C9600 PERC DRUG-EL COR STENT SING: HCPCS | Performed by: INTERNAL MEDICINE

## 2024-04-15 PROCEDURE — 93458 L HRT ARTERY/VENTRICLE ANGIO: CPT | Performed by: INTERNAL MEDICINE

## 2024-04-15 PROCEDURE — 63710000001 CLOPIDOGREL 75 MG TABLET: Performed by: INTERNAL MEDICINE

## 2024-04-15 PROCEDURE — 99153 MOD SED SAME PHYS/QHP EA: CPT | Performed by: INTERNAL MEDICINE

## 2024-04-15 PROCEDURE — 63710000001 METOPROLOL TARTRATE 25 MG TABLET: Performed by: INTERNAL MEDICINE

## 2024-04-15 PROCEDURE — C1894 INTRO/SHEATH, NON-LASER: HCPCS | Performed by: INTERNAL MEDICINE

## 2024-04-15 PROCEDURE — 92928 PRQ TCAT PLMT NTRAC ST 1 LES: CPT | Performed by: INTERNAL MEDICINE

## 2024-04-15 PROCEDURE — 80048 BASIC METABOLIC PNL TOTAL CA: CPT | Performed by: INTERNAL MEDICINE

## 2024-04-15 DEVICE — XIENCE SKYPOINT™ EVEROLIMUS ELUTING CORONARY STENT SYSTEM 3.00 MM X 23 MM / RAPID-EXCHANGE
Type: IMPLANTABLE DEVICE | Status: FUNCTIONAL
Brand: XIENCE SKYPOINT™

## 2024-04-15 RX ORDER — PANTOPRAZOLE SODIUM 40 MG/1
40 TABLET, DELAYED RELEASE ORAL
Status: DISCONTINUED | OUTPATIENT
Start: 2024-04-16 | End: 2024-04-17 | Stop reason: HOSPADM

## 2024-04-15 RX ORDER — DIPHENHYDRAMINE HCL 25 MG
CAPSULE ORAL
Status: DISPENSED
Start: 2024-04-15 | End: 2024-04-15

## 2024-04-15 RX ORDER — FENTANYL CITRATE 50 UG/ML
INJECTION, SOLUTION INTRAMUSCULAR; INTRAVENOUS
Status: DISCONTINUED | OUTPATIENT
Start: 2024-04-15 | End: 2024-04-15 | Stop reason: HOSPADM

## 2024-04-15 RX ORDER — DIPHENHYDRAMINE HYDROCHLORIDE 50 MG/ML
50 INJECTION INTRAMUSCULAR; INTRAVENOUS
Status: COMPLETED | OUTPATIENT
Start: 2024-04-15 | End: 2024-04-15

## 2024-04-15 RX ORDER — SODIUM CHLORIDE 9 MG/ML
40 INJECTION, SOLUTION INTRAVENOUS AS NEEDED
Status: DISCONTINUED | OUTPATIENT
Start: 2024-04-15 | End: 2024-04-15 | Stop reason: HOSPADM

## 2024-04-15 RX ORDER — DIPHENHYDRAMINE HCL 50 MG
50 CAPSULE ORAL
Status: COMPLETED | OUTPATIENT
Start: 2024-04-15 | End: 2024-04-15

## 2024-04-15 RX ORDER — ASPIRIN 81 MG/1
TABLET, CHEWABLE ORAL
Status: DISPENSED
Start: 2024-04-15 | End: 2024-04-15

## 2024-04-15 RX ORDER — LIDOCAINE HYDROCHLORIDE 20 MG/ML
INJECTION, SOLUTION INFILTRATION; PERINEURAL
Status: DISCONTINUED | OUTPATIENT
Start: 2024-04-15 | End: 2024-04-15 | Stop reason: HOSPADM

## 2024-04-15 RX ORDER — TRAMADOL HYDROCHLORIDE 50 MG/1
50 TABLET ORAL EVERY 8 HOURS PRN
Status: DISCONTINUED | OUTPATIENT
Start: 2024-04-15 | End: 2024-04-17 | Stop reason: HOSPADM

## 2024-04-15 RX ORDER — SODIUM CHLORIDE 0.9 % (FLUSH) 0.9 %
10 SYRINGE (ML) INJECTION AS NEEDED
Status: DISCONTINUED | OUTPATIENT
Start: 2024-04-15 | End: 2024-04-15 | Stop reason: HOSPADM

## 2024-04-15 RX ORDER — NITROGLYCERIN 0.4 MG/1
0.4 TABLET SUBLINGUAL
Status: DISCONTINUED | OUTPATIENT
Start: 2024-04-15 | End: 2024-04-17 | Stop reason: HOSPADM

## 2024-04-15 RX ORDER — ACETAMINOPHEN 325 MG/1
650 TABLET ORAL EVERY 4 HOURS PRN
Status: DISCONTINUED | OUTPATIENT
Start: 2024-04-15 | End: 2024-04-17 | Stop reason: HOSPADM

## 2024-04-15 RX ORDER — SODIUM CHLORIDE 9 MG/ML
125 INJECTION, SOLUTION INTRAVENOUS CONTINUOUS
Status: DISPENSED | OUTPATIENT
Start: 2024-04-15 | End: 2024-04-15

## 2024-04-15 RX ORDER — DILTIAZEM HYDROCHLORIDE 240 MG/1
240 CAPSULE, COATED, EXTENDED RELEASE ORAL DAILY
Status: DISCONTINUED | OUTPATIENT
Start: 2024-04-16 | End: 2024-04-16

## 2024-04-15 RX ORDER — ASPIRIN 81 MG/1
324 TABLET, CHEWABLE ORAL ONCE
Status: COMPLETED | OUTPATIENT
Start: 2024-04-15 | End: 2024-04-15

## 2024-04-15 RX ORDER — CLOPIDOGREL BISULFATE 75 MG/1
300 TABLET ORAL ONCE
Qty: 4 TABLET | Refills: 0 | Status: COMPLETED | OUTPATIENT
Start: 2024-04-15 | End: 2024-04-15

## 2024-04-15 RX ORDER — TAMSULOSIN HYDROCHLORIDE 0.4 MG/1
0.4 CAPSULE ORAL DAILY
Status: DISCONTINUED | OUTPATIENT
Start: 2024-04-16 | End: 2024-04-17 | Stop reason: HOSPADM

## 2024-04-15 RX ORDER — ASPIRIN 81 MG/1
81 TABLET ORAL DAILY
Status: DISCONTINUED | OUTPATIENT
Start: 2024-04-16 | End: 2024-04-17 | Stop reason: HOSPADM

## 2024-04-15 RX ORDER — CLOPIDOGREL BISULFATE 75 MG/1
75 TABLET ORAL DAILY
Status: DISCONTINUED | OUTPATIENT
Start: 2024-04-16 | End: 2024-04-17 | Stop reason: HOSPADM

## 2024-04-15 RX ORDER — SODIUM CHLORIDE 0.9 % (FLUSH) 0.9 %
3 SYRINGE (ML) INJECTION EVERY 12 HOURS SCHEDULED
Status: DISCONTINUED | OUTPATIENT
Start: 2024-04-15 | End: 2024-04-15 | Stop reason: HOSPADM

## 2024-04-15 RX ORDER — BACLOFEN 10 MG/1
10 TABLET ORAL NIGHTLY
Status: DISCONTINUED | OUTPATIENT
Start: 2024-04-16 | End: 2024-04-17 | Stop reason: HOSPADM

## 2024-04-15 RX ORDER — MIDAZOLAM HYDROCHLORIDE 1 MG/ML
INJECTION INTRAMUSCULAR; INTRAVENOUS
Status: DISCONTINUED | OUTPATIENT
Start: 2024-04-15 | End: 2024-04-15 | Stop reason: HOSPADM

## 2024-04-15 RX ORDER — ACETAMINOPHEN 500 MG
1000 TABLET ORAL EVERY 6 HOURS PRN
Status: DISCONTINUED | OUTPATIENT
Start: 2024-04-15 | End: 2024-04-15 | Stop reason: SDUPTHER

## 2024-04-15 RX ADMIN — SODIUM CHLORIDE 125 ML/HR: 9 INJECTION, SOLUTION INTRAVENOUS at 13:44

## 2024-04-15 RX ADMIN — DIPHENHYDRAMINE HYDROCHLORIDE 50 MG: 25 CAPSULE ORAL at 08:31

## 2024-04-15 RX ADMIN — METOPROLOL TARTRATE 25 MG: 25 TABLET, FILM COATED ORAL at 20:39

## 2024-04-15 RX ADMIN — PREDNISONE 50 MG: 20 TABLET ORAL at 08:32

## 2024-04-15 RX ADMIN — APIXABAN 2.5 MG: 2.5 TABLET, FILM COATED ORAL at 20:40

## 2024-04-15 RX ADMIN — CLOPIDOGREL BISULFATE 300 MG: 75 TABLET, FILM COATED ORAL at 17:19

## 2024-04-15 RX ADMIN — ASPIRIN 324 MG: 81 TABLET, CHEWABLE ORAL at 08:32

## 2024-04-15 NOTE — Clinical Note
First balloon inflation max pressure = 8 leny. First balloon inflation duration = 20 seconds. Second inflation of balloon - Max pressure = 8 leny. 2nd Inflation of balloon - Duration = 15 seconds. Third inflation of balloon - Max pressure = 8 leny. 3rd Inflation of balloon - Duration = 12 seconds. Fourth inflation of balloon - Max pressure = 8 leny. 4th Inflation of balloon - Duration = 8 seconds.

## 2024-04-16 DIAGNOSIS — Z95.5 S/P DRUG ELUTING CORONARY STENT PLACEMENT: Primary | ICD-10-CM

## 2024-04-16 LAB
ANION GAP SERPL CALCULATED.3IONS-SCNC: 11 MMOL/L (ref 5–15)
BUN SERPL-MCNC: 25 MG/DL (ref 8–23)
BUN/CREAT SERPL: 17.2 (ref 7–25)
CALCIUM SPEC-SCNC: 9.4 MG/DL (ref 8.6–10.5)
CHLORIDE SERPL-SCNC: 107 MMOL/L (ref 98–107)
CHOLEST SERPL-MCNC: 138 MG/DL (ref 0–200)
CO2 SERPL-SCNC: 22 MMOL/L (ref 22–29)
CREAT SERPL-MCNC: 1.45 MG/DL (ref 0.76–1.27)
DEPRECATED RDW RBC AUTO: 43.8 FL (ref 37–54)
EGFRCR SERPLBLD CKD-EPI 2021: 48.1 ML/MIN/1.73
ERYTHROCYTE [DISTWIDTH] IN BLOOD BY AUTOMATED COUNT: 12.9 % (ref 12.3–15.4)
GLUCOSE SERPL-MCNC: 102 MG/DL (ref 65–99)
HBA1C MFR BLD: 5.5 % (ref 4.8–5.6)
HCT VFR BLD AUTO: 36.2 % (ref 37.5–51)
HDLC SERPL-MCNC: 39 MG/DL (ref 40–60)
HGB BLD-MCNC: 11.8 G/DL (ref 13–17.7)
LDLC SERPL CALC-MCNC: 85 MG/DL (ref 0–100)
LDLC/HDLC SERPL: 2.17 {RATIO}
MCH RBC QN AUTO: 30.7 PG (ref 26.6–33)
MCHC RBC AUTO-ENTMCNC: 32.6 G/DL (ref 31.5–35.7)
MCV RBC AUTO: 94.3 FL (ref 79–97)
PLATELET # BLD AUTO: 258 10*3/MM3 (ref 140–450)
PMV BLD AUTO: 11 FL (ref 6–12)
POTASSIUM SERPL-SCNC: 3.8 MMOL/L (ref 3.5–5.2)
RBC # BLD AUTO: 3.84 10*6/MM3 (ref 4.14–5.8)
SODIUM SERPL-SCNC: 140 MMOL/L (ref 136–145)
TRIGL SERPL-MCNC: 72 MG/DL (ref 0–150)
VLDLC SERPL-MCNC: 14 MG/DL (ref 5–40)
WBC NRBC COR # BLD AUTO: 19.23 10*3/MM3 (ref 3.4–10.8)

## 2024-04-16 PROCEDURE — A9270 NON-COVERED ITEM OR SERVICE: HCPCS | Performed by: INTERNAL MEDICINE

## 2024-04-16 PROCEDURE — 63710000001 DILTIAZEM CD 240 MG CAPSULE SUSTAINED-RELEASE 24 HR: Performed by: INTERNAL MEDICINE

## 2024-04-16 PROCEDURE — 63710000001 PANTOPRAZOLE 40 MG TABLET DELAYED-RELEASE: Performed by: INTERNAL MEDICINE

## 2024-04-16 PROCEDURE — 85027 COMPLETE CBC AUTOMATED: CPT | Performed by: INTERNAL MEDICINE

## 2024-04-16 PROCEDURE — 99232 SBSQ HOSP IP/OBS MODERATE 35: CPT | Performed by: NURSE PRACTITIONER

## 2024-04-16 PROCEDURE — A9270 NON-COVERED ITEM OR SERVICE: HCPCS | Performed by: NURSE PRACTITIONER

## 2024-04-16 PROCEDURE — 63710000001 APIXABAN 2.5 MG TABLET: Performed by: INTERNAL MEDICINE

## 2024-04-16 PROCEDURE — 63710000001 TAMSULOSIN 0.4 MG CAPSULE: Performed by: INTERNAL MEDICINE

## 2024-04-16 PROCEDURE — 93005 ELECTROCARDIOGRAM TRACING: CPT | Performed by: NURSE PRACTITIONER

## 2024-04-16 PROCEDURE — 63710000001 TRAMADOL 50 MG TABLET: Performed by: INTERNAL MEDICINE

## 2024-04-16 PROCEDURE — 63710000001 ASPIRIN 81 MG TABLET DELAYED-RELEASE: Performed by: INTERNAL MEDICINE

## 2024-04-16 PROCEDURE — 80048 BASIC METABOLIC PNL TOTAL CA: CPT | Performed by: INTERNAL MEDICINE

## 2024-04-16 PROCEDURE — 80061 LIPID PANEL: CPT | Performed by: INTERNAL MEDICINE

## 2024-04-16 PROCEDURE — 83036 HEMOGLOBIN GLYCOSYLATED A1C: CPT | Performed by: INTERNAL MEDICINE

## 2024-04-16 PROCEDURE — 63710000001 METOPROLOL TARTRATE 25 MG TABLET: Performed by: INTERNAL MEDICINE

## 2024-04-16 PROCEDURE — 63710000001 ATORVASTATIN 40 MG TABLET: Performed by: NURSE PRACTITIONER

## 2024-04-16 PROCEDURE — 63710000001 DILTIAZEM CD 120 MG CAPSULE SUSTAINED-RELEASE 24 HR: Performed by: NURSE PRACTITIONER

## 2024-04-16 PROCEDURE — 63710000001 BACLOFEN 10 MG TABLET: Performed by: INTERNAL MEDICINE

## 2024-04-16 PROCEDURE — 93010 ELECTROCARDIOGRAM REPORT: CPT | Performed by: EMERGENCY MEDICINE

## 2024-04-16 PROCEDURE — 63710000001 CLOPIDOGREL 75 MG TABLET: Performed by: INTERNAL MEDICINE

## 2024-04-16 RX ORDER — DILTIAZEM HYDROCHLORIDE 120 MG/1
120 CAPSULE, COATED, EXTENDED RELEASE ORAL ONCE
Status: COMPLETED | OUTPATIENT
Start: 2024-04-16 | End: 2024-04-16

## 2024-04-16 RX ORDER — ATORVASTATIN CALCIUM 40 MG/1
40 TABLET, FILM COATED ORAL NIGHTLY
Status: DISCONTINUED | OUTPATIENT
Start: 2024-04-16 | End: 2024-04-17 | Stop reason: HOSPADM

## 2024-04-16 RX ORDER — DILTIAZEM HCL/D5W 125 MG/125
5-15 PLASTIC BAG, INJECTION (ML) INTRAVENOUS
Status: DISCONTINUED | OUTPATIENT
Start: 2024-04-16 | End: 2024-04-17

## 2024-04-16 RX ORDER — DILTIAZEM HYDROCHLORIDE 5 MG/ML
10 INJECTION INTRAVENOUS ONCE
Status: COMPLETED | OUTPATIENT
Start: 2024-04-16 | End: 2024-04-16

## 2024-04-16 RX ADMIN — PANTOPRAZOLE SODIUM 40 MG: 40 TABLET, DELAYED RELEASE ORAL at 04:17

## 2024-04-16 RX ADMIN — DILTIAZEM HYDROCHLORIDE 120 MG: 120 CAPSULE, EXTENDED RELEASE ORAL at 13:24

## 2024-04-16 RX ADMIN — TRAMADOL HYDROCHLORIDE 50 MG: 50 TABLET ORAL at 00:53

## 2024-04-16 RX ADMIN — DILTIAZEM HYDROCHLORIDE 10 MG: 5 INJECTION INTRAVENOUS at 09:13

## 2024-04-16 RX ADMIN — Medication 5 MG/HR: at 10:49

## 2024-04-16 RX ADMIN — TAMSULOSIN HYDROCHLORIDE 0.4 MG: 0.4 CAPSULE ORAL at 09:13

## 2024-04-16 RX ADMIN — APIXABAN 2.5 MG: 2.5 TABLET, FILM COATED ORAL at 20:15

## 2024-04-16 RX ADMIN — METOPROLOL TARTRATE 25 MG: 25 TABLET, FILM COATED ORAL at 09:13

## 2024-04-16 RX ADMIN — CLOPIDOGREL BISULFATE 75 MG: 75 TABLET, FILM COATED ORAL at 17:32

## 2024-04-16 RX ADMIN — Medication 15 MG/HR: at 15:03

## 2024-04-16 RX ADMIN — Medication 15 MG/HR: at 20:13

## 2024-04-16 RX ADMIN — DILTIAZEM HYDROCHLORIDE 240 MG: 240 CAPSULE, EXTENDED RELEASE ORAL at 09:13

## 2024-04-16 RX ADMIN — METOPROLOL TARTRATE 25 MG: 25 TABLET, FILM COATED ORAL at 20:17

## 2024-04-16 RX ADMIN — Medication 10 MG/HR: at 14:29

## 2024-04-16 RX ADMIN — ASPIRIN 81 MG: 81 TABLET, COATED ORAL at 09:15

## 2024-04-16 RX ADMIN — ATORVASTATIN CALCIUM 40 MG: 40 TABLET, FILM COATED ORAL at 20:13

## 2024-04-16 RX ADMIN — APIXABAN 2.5 MG: 2.5 TABLET, FILM COATED ORAL at 09:13

## 2024-04-16 RX ADMIN — BACLOFEN 10 MG: 10 TABLET ORAL at 20:14

## 2024-04-16 NOTE — PLAN OF CARE
Goal Outcome Evaluation:              Outcome Evaluation: ALOX4. no c/o pain. independent. RA. NSR on tele. possible d/c today.

## 2024-04-16 NOTE — INTERVAL H&P NOTE
H&P updated. The patient was examined and the following changes are noted:  patient called back in, saying chest discomfort had returned and worsened despite having increased the ranexa dose.  As such, cardiac catheterization was recommended.     He agreed to proceed.    I discussed cardiac catheterization, the procedure, risks (including bleeding, infection, vascular damage [including minor oozing, bruising, bleeding, and up to and including the need for vascular surgery], contrast reaction, renal failure, respiratory failure, heart attack, stroke, arrhythmia and even death), benefits, and alternatives and the patient has voiced understanding and is willing to proceed.

## 2024-04-16 NOTE — PROGRESS NOTES
[facility] -  Progress Note     LOS: 0 days   Patient Care Team:  Kaleb Villavicencio MD as PCP - General  Kaleb Villavicencio MD as PCP - Family Medicine  Mani Gary MD as Consulting Physician (Urology)  Mariely Desir APRN as Referring Physician (Family Medicine)  Jeremie Lala MD as Cardiologist (Cardiology)  Antonio Salgado MD as Consulting Physician (Gastroenterology)  Domenic Smith PA as Physician Assistant (Urology)  Jenny Linares APRN as Nurse Practitioner (Family Medicine)    Chief Complaint: follow up s/p PCI     Subjective     Interval History:     Patient Complaints: The patient presented yesterday for cardiac catheterization.  Initially his chest discomfort had resolved with Ranexa but after dose increased to 1000 mg twice daily he called back with worsening chest pain.  Therefore, Dr. Lala recommended cardiac catheterization.    See details in cath report, but ultimately he underwent PCI to ramus branch yesterday with 1 drug-eluting stent.  He has a normal LVEF and normal LVEDP.    Dr. Lala also follows him for paroxysmal atrial fibrillation and PSVT.  He was in normal sinus rhythm until 8 AM this morning when he went into rapid atrial fibrillation with heart rates reaching 180s at times.  He is symptomatic with shortness of breath and chest heaviness when his heart rate is significantly elevated (seems to correlate with heart rates above 150s).    We have treated him with IV diltiazem bolus 10 mg and started him on a drip and given him an additional dose of 120 mg CD.  We will increase his diltiazem dose tomorrow morning from 240 mg to 360 mg CD.  He will continue Lopressor 25 mg twice daily.    We will attempt to wean the diltiazem drip overnight.  At rest, his heart rates have improved to 90s to 110s on max dose diltiazem drip, but quickly approach 200 with minimal activity.    He will be on triple therapy for 2 weeks and then discontinue  aspirin.        Review of Systems:     Review of Systems   Constitutional:  Negative for diaphoresis, fatigue, fever and unexpected weight change.   HENT:  Negative for nosebleeds.    Respiratory:  Positive for shortness of breath. Negative for apnea, cough, chest tightness and wheezing.    Cardiovascular:  Positive for chest pain. Negative for palpitations and leg swelling.   Gastrointestinal:  Negative for abdominal distention, nausea and vomiting.   Genitourinary:  Negative for hematuria.   Musculoskeletal:  Negative for gait problem.   Skin:  Negative for color change.   Neurological:  Negative for dizziness, syncope, weakness and light-headedness.     Objective     Vital Sign Min/Max for last 24 hours  Temp  Min: 97.3 °F (36.3 °C)  Max: 98 °F (36.7 °C)   BP  Min: 139/83  Max: 181/80   Pulse  Min: 66  Max: 187   Resp  Min: 16  Max: 18   SpO2  Min: 95 %  Max: 98 %   No data recorded   No data recorded         04/15/24  1300   Weight: 77.8 kg (171 lb 9.6 oz)       Physical Exam:    Vitals and nursing note reviewed.   Constitutional:       General: Not in acute distress.     Appearance: Well-developed and not in distress. Not diaphoretic.   Neck:      Vascular: No JVD.   Pulmonary:      Effort: Pulmonary effort is normal. No respiratory distress.      Breath sounds: Normal breath sounds.   Cardiovascular:      Tachycardia present. Irregularly irregular rhythm.      Murmurs: There is no murmur.   Edema:     Peripheral edema absent.   Abdominal:      Tenderness: There is no abdominal tenderness.   Skin:     General: Skin is warm and dry.   Neurological:      Mental Status: Alert and oriented to person, place, and time.       Results Review:   Lab Results (last 72 hours)       Procedure Component Value Units Date/Time    Basic Metabolic Panel [[id number]]  (Abnormal) Collected: 04/16/24 0413    Specimen: Blood Updated: 04/16/24 0543     Glucose 102 mg/dL      BUN 25 mg/dL      Creatinine 1.45 mg/dL      Sodium 140  mmol/L      Potassium 3.8 mmol/L      Chloride 107 mmol/L      CO2 22.0 mmol/L      Calcium 9.4 mg/dL      BUN/Creatinine Ratio 17.2     Anion Gap 11.0 mmol/L      eGFR 48.1 mL/min/1.73     Narrative:      GFR Normal >60  Chronic Kidney Disease <60  Kidney Failure <15    The GFR formula is only valid for adults with stable renal function between ages 18 and 70.    Lipid Panel [[id number]]  (Abnormal) Collected: 04/16/24 0413    Specimen: Blood Updated: 04/16/24 0543     Total Cholesterol 138 mg/dL      Triglycerides 72 mg/dL      HDL Cholesterol 39 mg/dL      LDL Cholesterol  85 mg/dL      VLDL Cholesterol 14 mg/dL      LDL/HDL Ratio 2.17    Narrative:      Cholesterol Reference Ranges  (U.S. Department of Health and Human Services ATP III Classifications)    Desirable          <200 mg/dL  Borderline High    200-239 mg/dL  High Risk          >240 mg/dL      Triglyceride Reference Ranges  (U.S. Department of Health and Human Services ATP III Classifications)    Normal           <150 mg/dL  Borderline High  150-199 mg/dL  High             200-499 mg/dL  Very High        >500 mg/dL    HDL Reference Ranges  (U.S. Department of Health and Human Services ATP III Classifications)    Low     <40 mg/dl (major risk factor for CHD)  High    >60 mg/dl ('negative' risk factor for CHD)        LDL Reference Ranges  (U.S. Department of Health and Human Services ATP III Classifications)    Optimal          <100 mg/dL  Near Optimal     100-129 mg/dL  Borderline High  130-159 mg/dL  High             160-189 mg/dL  Very High        >189 mg/dL    Hemoglobin A1c [[id number]]  (Normal) Collected: 04/16/24 0413    Specimen: Blood Updated: 04/16/24 0534     Hemoglobin A1C 5.50 %     Narrative:      Hemoglobin A1C Ranges:    Increased Risk for Diabetes  5.7% to 6.4%  Diabetes                     >= 6.5%  Diabetic Goal                < 7.0%    CBC (No Diff) [[id number]]  (Abnormal) Collected: 04/16/24 0413    Specimen: Blood Updated: 04/16/24  "0527     WBC 19.23 10*3/mm3      RBC 3.84 10*6/mm3      Hemoglobin 11.8 g/dL      Hematocrit 36.2 %      MCV 94.3 fL      MCH 30.7 pg      MCHC 32.6 g/dL      RDW 12.9 %      RDW-SD 43.8 fl      MPV 11.0 fL      Platelets 258 10*3/mm3     POC Activated Clotting Time [[id number]]  (Abnormal) Collected: 04/15/24 1118    Specimen: Blood Updated: 04/15/24 1355     Activated Clotting Time  358 Seconds      Comment: Serial Number: [id number]:  [id number]       Basic Metabolic Panel [[id number]]  (Abnormal) Collected: 04/15/24 0819    Specimen: Blood Updated: 04/15/24 0854     Glucose 143 mg/dL      BUN 24 mg/dL      Creatinine 1.57 mg/dL      Sodium 140 mmol/L      Potassium 4.3 mmol/L      Comment: Slight hemolysis detected by analyzer. Result may be falsely elevated.        Chloride 104 mmol/L      CO2 23.0 mmol/L      Calcium 9.6 mg/dL      BUN/Creatinine Ratio 15.3     Anion Gap 13.0 mmol/L      eGFR 43.7 mL/min/1.73     Narrative:      GFR Normal >60  Chronic Kidney Disease <60  Kidney Failure <15    The GFR formula is only valid for adults with stable renal function between ages 18 and 70.    CBC (No Diff) [[id number]]  (Normal) Collected: 04/15/24 0819    Specimen: Blood Updated: 04/15/24 0837     WBC 10.56 10*3/mm3      RBC 4.22 10*6/mm3      Hemoglobin 13.0 g/dL      Hematocrit 39.2 %      MCV 92.9 fL      MCH 30.8 pg      MCHC 33.2 g/dL      RDW 12.7 %      RDW-SD 42.7 fl      MPV 10.2 fL      Platelets 298 10*3/mm3                 Echo EF Estimated  No results found for: \"ECHOEFEST\"      Cath Ejection Fraction Quantitative  No results found for: \"CATHEF\"        Medication Review: yes  Current Facility-Administered Medications   Medication Dose Route Frequency Provider Last Rate Last Admin    acetaminophen (TYLENOL) tablet 650 mg  650 mg Oral Q4H PRN Jeremie Lala MD        apixaban (ELIQUIS) tablet 2.5 mg  2.5 mg Oral Q12H Jeremie Lala MD   2.5 mg at 04/16/24 0913    aspirin EC tablet 81 mg  81 mg Oral " "Daily Jeremie Lala MD   81 mg at 04/16/24 0915    atorvastatin (LIPITOR) tablet 40 mg  40 mg Oral Nightly Jenny Linares APRN        baclofen (LIORESAL) tablet 10 mg  10 mg Oral Nightly Jeremie Lala MD        clopidogrel (PLAVIX) tablet 75 mg  75 mg Oral Daily Jeremie Lala MD        dilTIAZem (CARDIZEM) 125 mg in 125 mL D5W infusion  5-15 mg/hr Intravenous Titrated Jenny Linares APRN 15 mL/hr at 04/16/24 1503 15 mg/hr at 04/16/24 1503    [START ON 4/17/2024] dilTIAZem CD (CARDIZEM CD) 24 hr capsule 360 mg  360 mg Oral Daily Jenny Linares APRN        metoprolol tartrate (LOPRESSOR) tablet 25 mg  25 mg Oral BID Jeremie Lala MD   25 mg at 04/16/24 0913    nitroglycerin (NITROSTAT) SL tablet 0.4 mg  0.4 mg Sublingual Q5 Min PRN Jeremie Lala MD        pantoprazole (PROTONIX) EC tablet 40 mg  40 mg Oral Q AM Jeremie Lala MD   40 mg at 04/16/24 0417    tamsulosin (FLOMAX) 24 hr capsule 0.4 mg  0.4 mg Oral Daily Jeremie Lala MD   0.4 mg at 04/16/24 0913    traMADol (ULTRAM) tablet 50 mg  50 mg Oral Q8H PRN eJremie Lala MD   50 mg at 04/16/24 0053     4/15/2024 cath report:    Impression:  1. Single vessel coronary artery disease, with an 80% stenosis and a medium to large caliber ramus intermediate branch.  2. Successful percutaneous coronary intervention the ramus intermediate using a 3.0x23 Xience drug-eluting stent.  3. Normal LVEF with normal LVEDP     Plan:   1. TR band off in 2 hours; plan for observation with telemetry overnight and likely discharge home tomorrow  2. Continue aspirin 81mg daily indefinitely, and clopidogrel 75 mg daily for at least 6 months. Will receive a transitionary \"loading\" dose tonight with range milligrams p.o. x 1, then start 75 mg p.o. daily tomorrow.  3. Aggressive risk factor modification, including high potency statin, ACE inhibitor, and beta-blocker (if blood pressure and heart rate will allow).  4. Cardiac rehab referral.  6. Follow-up with me in 4 " weeks, and PCP in 1-2 weeks.    Jeremie Lala MD       Assessment & Plan     1.  Coronary artery disease now status post PCI to [facility] with 1 drug-eluting stent: Stable.  He cannot really appreciate improvement in the way he feels in regards to this just yet, but is not having any chest discomfort or shortness of breath when his heart rate is controlled.    -He will continue Eliquis, aspirin and Plavix for 2 weeks and then discontinue aspirin.  At the 6 months post PCI owen, we will plan to transition from Plavix to aspirin and continue Eliquis  -Start atorvastatin 40 mg nightly  -Continue beta-blocker  -Follow-up with Dr. Lala in 4 weeks  -Cardiac rehab 2 weeks    2.  Paroxysmal atrial fibrillation, with rapid ventricular response: Uptitrate diltiazem to 360 mg daily CD tomorrow morning.  We did go ahead and give him an extra dose of 120 mg daily today to equal 360 mg.  Continue Lopressor 25 mg twice daily.  Wean diltiazem drip as able for resting heart rates below 120 bpm.  Continue anticoagulation with Eliquis.  As noted above he is symptomatic to significantly elevated heart rates but symptoms resolve when heart rates improve.  If we were able to successfully control his heart rate we may be able to discharge him home tomorrow and a Holter monitor.      Jenny Linares, TAI  04/16/24  15:47 CDT

## 2024-04-17 ENCOUNTER — HOSPITAL ENCOUNTER (OUTPATIENT)
Dept: CARDIOLOGY | Facility: HOSPITAL | Age: 84
Discharge: HOME | End: 2024-04-17
Payer: MEDICARE

## 2024-04-17 VITALS
OXYGEN SATURATION: 95 % | SYSTOLIC BLOOD PRESSURE: 129 MMHG | DIASTOLIC BLOOD PRESSURE: 74 MMHG | BODY MASS INDEX: 23.24 KG/M2 | WEIGHT: 171.6 LBS | HEART RATE: 70 BPM | HEIGHT: 72 IN | RESPIRATION RATE: 16 BRPM | TEMPERATURE: 97.9 F

## 2024-04-17 LAB
QT INTERVAL: 330 MS
QTC INTERVAL: 446 MS

## 2024-04-17 PROCEDURE — 63710000001 DILTIAZEM CD 240 MG CAPSULE SUSTAINED-RELEASE 24 HR: Performed by: NURSE PRACTITIONER

## 2024-04-17 PROCEDURE — 63710000001 APIXABAN 2.5 MG TABLET: Performed by: INTERNAL MEDICINE

## 2024-04-17 PROCEDURE — A9270 NON-COVERED ITEM OR SERVICE: HCPCS | Performed by: INTERNAL MEDICINE

## 2024-04-17 PROCEDURE — 63710000001 METOPROLOL TARTRATE 25 MG TABLET: Performed by: INTERNAL MEDICINE

## 2024-04-17 PROCEDURE — 93005 ELECTROCARDIOGRAM TRACING: CPT | Performed by: NURSE PRACTITIONER

## 2024-04-17 PROCEDURE — 63710000001 ASPIRIN 81 MG TABLET DELAYED-RELEASE: Performed by: INTERNAL MEDICINE

## 2024-04-17 PROCEDURE — A9270 NON-COVERED ITEM OR SERVICE: HCPCS | Performed by: NURSE PRACTITIONER

## 2024-04-17 PROCEDURE — 63710000001 TRAMADOL 50 MG TABLET: Performed by: INTERNAL MEDICINE

## 2024-04-17 PROCEDURE — 25010000002 AMIODARONE IN DEXTROSE 5% 150-4.21 MG/100ML-% SOLUTION: Performed by: NURSE PRACTITIONER

## 2024-04-17 PROCEDURE — 63710000001 TAMSULOSIN 0.4 MG CAPSULE: Performed by: INTERNAL MEDICINE

## 2024-04-17 PROCEDURE — 25010000002 AMIODARONE IN DEXTROSE 5% 360-4.14 MG/200ML-% SOLUTION: Performed by: NURSE PRACTITIONER

## 2024-04-17 PROCEDURE — 93246 EXT ECG>7D<15D RECORDING: CPT

## 2024-04-17 PROCEDURE — 63710000001 PANTOPRAZOLE 40 MG TABLET DELAYED-RELEASE: Performed by: INTERNAL MEDICINE

## 2024-04-17 PROCEDURE — 93010 ELECTROCARDIOGRAM REPORT: CPT | Performed by: INTERNAL MEDICINE

## 2024-04-17 PROCEDURE — 63710000001 DILTIAZEM CD 120 MG CAPSULE SUSTAINED-RELEASE 24 HR: Performed by: NURSE PRACTITIONER

## 2024-04-17 PROCEDURE — 99239 HOSP IP/OBS DSCHRG MGMT >30: CPT | Performed by: NURSE PRACTITIONER

## 2024-04-17 RX ORDER — ATORVASTATIN CALCIUM 40 MG/1
40 TABLET, FILM COATED ORAL NIGHTLY
Qty: 90 TABLET | Refills: 3 | Status: SHIPPED | OUTPATIENT
Start: 2024-04-17

## 2024-04-17 RX ORDER — NITROGLYCERIN 0.4 MG/1
0.4 TABLET SUBLINGUAL
Qty: 25 TABLET | Refills: 1 | Status: SHIPPED | OUTPATIENT
Start: 2024-04-17

## 2024-04-17 RX ORDER — ASPIRIN 81 MG/1
81 TABLET ORAL DAILY
Qty: 14 TABLET | Refills: 0 | Status: SHIPPED | OUTPATIENT
Start: 2024-04-18

## 2024-04-17 RX ORDER — AMIODARONE HYDROCHLORIDE 200 MG/1
200 TABLET ORAL DAILY
Qty: 72 TABLET | Refills: 5 | Status: SHIPPED | OUTPATIENT
Start: 2024-04-17

## 2024-04-17 RX ORDER — DILTIAZEM HYDROCHLORIDE 360 MG/1
360 CAPSULE, EXTENDED RELEASE ORAL DAILY
Qty: 30 CAPSULE | Refills: 11 | Status: SHIPPED | OUTPATIENT
Start: 2024-04-18

## 2024-04-17 RX ORDER — CLOPIDOGREL BISULFATE 75 MG/1
75 TABLET ORAL DAILY
Qty: 30 TABLET | Refills: 5 | Status: SHIPPED | OUTPATIENT
Start: 2024-04-17

## 2024-04-17 RX ADMIN — APIXABAN 2.5 MG: 2.5 TABLET, FILM COATED ORAL at 08:00

## 2024-04-17 RX ADMIN — DILTIAZEM HYDROCHLORIDE 360 MG: 240 CAPSULE, EXTENDED RELEASE ORAL at 08:00

## 2024-04-17 RX ADMIN — AMIODARONE HYDROCHLORIDE 1 MG/MIN: 1.8 INJECTION, SOLUTION INTRAVENOUS at 11:09

## 2024-04-17 RX ADMIN — ASPIRIN 81 MG: 81 TABLET, COATED ORAL at 08:00

## 2024-04-17 RX ADMIN — AMIODARONE HYDROCHLORIDE 150 MG: 1.5 INJECTION, SOLUTION INTRAVENOUS at 10:54

## 2024-04-17 RX ADMIN — TRAMADOL HYDROCHLORIDE 50 MG: 50 TABLET ORAL at 08:00

## 2024-04-17 RX ADMIN — PANTOPRAZOLE SODIUM 40 MG: 40 TABLET, DELAYED RELEASE ORAL at 05:11

## 2024-04-17 RX ADMIN — TAMSULOSIN HYDROCHLORIDE 0.4 MG: 0.4 CAPSULE ORAL at 08:00

## 2024-04-17 RX ADMIN — METOPROLOL TARTRATE 25 MG: 25 TABLET, FILM COATED ORAL at 08:00

## 2024-04-17 NOTE — PLAN OF CARE
Goal Outcome Evaluation:    Problem: Adult Inpatient Plan of Care  Goal: Plan of Care Review  Outcome: Ongoing, Not Progressing  Flowsheets (Taken 4/17/2024 033)  Progress: no change  Plan of Care Reviewed With: patient  Outcome Evaluation: No c/o pain as of this time. Cardizem gtt off at @ 2237. AF  on tele. Plan of care ongoing.

## 2024-04-17 NOTE — DISCHARGE SUMMARY
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 Lala on 4/15    -Paroxysmal atrial fibrillation, with rapid ventricular response    Presenting Problem/History of Present Illness and Hospital Course:    Precordial pain [R07.2]  S/P drug eluting coronary stent placement [Z95.5]  The patient presented 4/15 for cardiac catheterization given his CCS 2 angina on maximally tolerated antianginal therapy.  Initially his chest discomfort had resolved with Ranexa but after dose increased to 1000 mg twice daily he called back with worsening chest pain.  Therefore, Dr. Lala recommended cardiac catheterization.     See details in cath report, but ultimately he underwent PCI to ramus branch 4/15 with 1 drug-eluting stent.  He has a normal LVEF and normal LVEDP.     Dr. Lala also follows him for paroxysmal atrial fibrillation and PSVT.  He was in normal sinus rhythm until 8 AM 4/16 when he went into rapid atrial fibrillation with heart rates reaching 180s at times.  He was symptomatic with shortness of breath and chest heaviness when his heart rate is significantly elevated (seems to correlate with heart rates 150s or above).     Therefore, yesterday we treated him with IV diltiazem bolus and drip and increased his diltiazem CD from 240 mg daily to 360 mg daily.  Lopressor 25 mg twice daily was continued.  Given the symptomatic rapid atrial fibrillation he was not discharged as originally planned.     Review of telemetry this morning revealed he remains in atrial fibrillation but his heart rate was well-controlled overnight-60s to low 100s.  As of this morning around 8 AM resting heart rates are in the 110s to 130s and up to 150s briefly with activity.  Per my discussion with the patient he did have some shortness of breath with his more rapid heart  "rates when getting up to the bathroom this morning.  He denies any chest pain.  He is otherwise without complaint.     The diltiazem drip was weaned off last night around 11 PM.    After discussion with Dr. Lala I started him on IV amiodarone bolus and drip per protocol.  Approximately 5 to 10 minutes into bolus infusion, the patient converted to normal sinus rhythm with heart rates in the 70s.  As he is now in normal sinus rhythm and no longer having chest discomfort or shortness of breath (was associated with rapid A-fib), he is felt to be stable for discharge.  We will send him home in a 14-day Holter monitor.  We will also send him home on amiodarone-400 mg twice daily for 14 days followed by 200 mg daily.  He will continue diltiazem and metoprolol for rate control with recurrences.     He will be on triple therapy for 2 weeks (aspirin, Plavix and Eliquis) and then discontinue aspirin.     He has been referred to begin cardiac rehab in 2 weeks.  He will follow-up with Dr. Lala in 4 weeks.    He has been started on a high intensity statin this admission.      Procedures Performed  Procedure(s):  Coronary angiography  Percutaneous Coronary Intervention  04/15 1132 Note By: Jeremie Lala MD    Impression:  1. Single vessel coronary artery disease, with an 80% stenosis and a medium to large caliber ramus intermediate branch.  2. Successful percutaneous coronary intervention the ramus intermediate using a 3.0x23 Xience drug-eluting stent.  3. Normal LVEF with normal LVEDP     Plan:   1. TR band off in 2 hours; plan for observation with telemetry overnight and likely discharge home tomorrow  2. Continue aspirin 81mg daily indefinitely, and clopidogrel 75 mg daily for at least 6 months. Will receive a transitionary \"loading\" dose tonight with range milligrams p.o. x 1, then start 75 mg p.o. daily tomorrow.  3. Aggressive risk factor modification, including high potency statin, ACE inhibitor, and beta-blocker (if " blood pressure and heart rate will allow).  4. Cardiac rehab referral.  6. Follow-up with me in 4 weeks, and PCP in 1-2 weeks.    Jeremie Lala MD     Lab Results (last 72 hours)       Procedure Component Value Units Date/Time    Basic Metabolic Panel [[id number]]  (Abnormal) Collected: 04/16/24 0413    Specimen: Blood Updated: 04/16/24 0543     Glucose 102 mg/dL      BUN 25 mg/dL      Creatinine 1.45 mg/dL      Sodium 140 mmol/L      Potassium 3.8 mmol/L      Chloride 107 mmol/L      CO2 22.0 mmol/L      Calcium 9.4 mg/dL      BUN/Creatinine Ratio 17.2     Anion Gap 11.0 mmol/L      eGFR 48.1 mL/min/1.73     Narrative:      GFR Normal >60  Chronic Kidney Disease <60  Kidney Failure <15    The GFR formula is only valid for adults with stable renal function between ages 18 and 70.    Lipid Panel [[id number]]  (Abnormal) Collected: 04/16/24 0413    Specimen: Blood Updated: 04/16/24 0543     Total Cholesterol 138 mg/dL      Triglycerides 72 mg/dL      HDL Cholesterol 39 mg/dL      LDL Cholesterol  85 mg/dL      VLDL Cholesterol 14 mg/dL      LDL/HDL Ratio 2.17    Narrative:      Cholesterol Reference Ranges  (U.S. Department of Health and Human Services ATP III Classifications)    Desirable          <200 mg/dL  Borderline High    200-239 mg/dL  High Risk          >240 mg/dL      Triglyceride Reference Ranges  (U.S. Department of Health and Human Services ATP III Classifications)    Normal           <150 mg/dL  Borderline High  150-199 mg/dL  High             200-499 mg/dL  Very High        >500 mg/dL    HDL Reference Ranges  (U.S. Department of Health and Human Services ATP III Classifications)    Low     <40 mg/dl (major risk factor for CHD)  High    >60 mg/dl ('negative' risk factor for CHD)        LDL Reference Ranges  (U.S. Department of Health and Human Services ATP III Classifications)    Optimal          <100 mg/dL  Near Optimal     100-129 mg/dL  Borderline High  130-159 mg/dL  High             160-189  mg/dL  Very High        >189 mg/dL    Hemoglobin A1c [[id number]]  (Normal) Collected: 04/16/24 0413    Specimen: Blood Updated: 04/16/24 0534     Hemoglobin A1C 5.50 %     Narrative:      Hemoglobin A1C Ranges:    Increased Risk for Diabetes  5.7% to 6.4%  Diabetes                     >= 6.5%  Diabetic Goal                < 7.0%    CBC (No Diff) [[id number]]  (Abnormal) Collected: 04/16/24 0413    Specimen: Blood Updated: 04/16/24 0527     WBC 19.23 10*3/mm3      RBC 3.84 10*6/mm3      Hemoglobin 11.8 g/dL      Hematocrit 36.2 %      MCV 94.3 fL      MCH 30.7 pg      MCHC 32.6 g/dL      RDW 12.9 %      RDW-SD 43.8 fl      MPV 11.0 fL      Platelets 258 10*3/mm3     POC Activated Clotting Time [[id number]]  (Abnormal) Collected: 04/15/24 1118    Specimen: Blood Updated: 04/15/24 1355     Activated Clotting Time  358 Seconds      Comment: Serial Number: [id number]:  [id number]       Basic Metabolic Panel [[id number]]  (Abnormal) Collected: 04/15/24 0819    Specimen: Blood Updated: 04/15/24 0854     Glucose 143 mg/dL      BUN 24 mg/dL      Creatinine 1.57 mg/dL      Sodium 140 mmol/L      Potassium 4.3 mmol/L      Comment: Slight hemolysis detected by analyzer. Result may be falsely elevated.        Chloride 104 mmol/L      CO2 23.0 mmol/L      Calcium 9.6 mg/dL      BUN/Creatinine Ratio 15.3     Anion Gap 13.0 mmol/L      eGFR 43.7 mL/min/1.73     Narrative:      GFR Normal >60  Chronic Kidney Disease <60  Kidney Failure <15    The GFR formula is only valid for adults with stable renal function between ages 18 and 70.    CBC (No Diff) [[id number]]  (Normal) Collected: 04/15/24 0819    Specimen: Blood Updated: 04/15/24 0837     WBC 10.56 10*3/mm3      RBC 4.22 10*6/mm3      Hemoglobin 13.0 g/dL      Hematocrit 39.2 %      MCV 92.9 fL      MCH 30.8 pg      MCHC 33.2 g/dL      RDW 12.7 %      RDW-SD 42.7 fl      MPV 10.2 fL      Platelets 298 10*3/mm3              Condition on Discharge:  Stable     Physical Exam at  Discharge  General: alert and oriented  Card: irregular rhythm at time of exam, but back in NSR at time of discharge  Resp:CTA  Extrem: no edema, right radial cath site CDI, no swelling, warmth, hematoma or drainage, good cap refill, pulse 3+    Vital Signs  Temp:  [97.1 °F (36.2 °C)-98.5 °F (36.9 °C)] 97.9 °F (36.6 °C)  Heart Rate:  [] 70  Resp:  [16-18] 16  BP: (106-139)/(67-90) 129/74      Discharge Disposition  Home or Self Care    Discharge Medications     Discharge Medications        New Medications        Instructions Start Date   amiodarone 200 MG tablet  Commonly known as: PACERONE   200 mg, Oral, Daily, TAKE 400 MG TWICE A DAY FOR 14 DAYS THEN REDUCE  MG ONCE A DAY      aspirin 81 MG EC tablet   81 mg, Oral, Daily, STOP AFTER 14 DAYS   Start Date: April 18, 2024     atorvastatin 40 MG tablet  Commonly known as: LIPITOR   40 mg, Oral, Nightly      clopidogrel 75 MG tablet  Commonly known as: PLAVIX   75 mg, Oral, Daily      nitroglycerin 0.4 MG SL tablet  Commonly known as: NITROSTAT   0.4 mg, Sublingual, Every 5 Minutes PRN, Take no more than 3 doses in 15 minutes.             Changes to Medications        Instructions Start Date   dilTIAZem  MG 24 hr capsule  Commonly known as: CARDIZEM CD  What changed:   medication strength  how much to take   360 mg, Oral, Daily   Start Date: April 18, 2024            Continue These Medications        Instructions Start Date   acetaminophen 500 MG tablet  Commonly known as: TYLENOL   1,000 mg, Oral, Every 6 Hours PRN      apixaban 2.5 MG tablet tablet  Commonly known as: ELIQUIS   2.5 mg, Oral, Every 12 Hours Scheduled      baclofen 10 MG tablet  Commonly known as: LIORESAL   1 tablet, Oral, Every Night at Bedtime      gemfibrozil 600 MG tablet  Commonly known as: LOPID   600 mg, Oral, Daily      metoprolol tartrate 25 MG tablet  Commonly known as: LOPRESSOR   25 mg, Oral, 2 Times Daily      omeprazole 20 MG capsule  Commonly known as: priLOSEC   20  mg, Oral, Daily      tamsulosin 0.4 MG capsule 24 hr capsule  Commonly known as: FLOMAX   0.4 mg, Oral, Daily      traMADol 50 MG tablet  Commonly known as: ULTRAM   1 tablet, Oral, Every 8 Hours PRN      vitamin D3 125 MCG (5000 UT) capsule capsule   5,000 Units, Oral, Daily               Discharge Diet: heart healthy     Activity at Discharge:   No heavy lifting for 5-7 days greater than 10 pounds.  No heavy or strenuous pushing or pulling.  No tub baths, hot tubs, swimming pools, or submerging site underwater for 1 week.  Wash site daily with antibacterial soap and water. Rinse and keep clean and dry.  No lotions, powders, or topical ointments to site. Keep open to air and dry.  Call our office with any concerns or if you notice redness, swelling, drainage, warmth, or pain at the site.     Follow-up Appointments  PCP 1-2 weeks  Dr. Lala 4 weeks  Cardiac rehab 2 weeks    Place 14 day zio patch at discharge     Future Appointments   Date Time Provider Department Center   9/20/2024 11:15 AM Jeremie Lala MD [facility]     Additional Instructions for the Follow-ups that You Need to Schedule       Ambulatory Referral to Cardiac Rehab   As directed                 Electronically signed by TAI Chisholm, 04/17/24, 12:32 PM CDT.     Time:45 minutes

## 2024-04-17 NOTE — CASE MANAGEMENT/SOCIAL WORK
Discharge Planning Assessment  [facility]     Patient Name: Fabian Velez Sr.  MRN: [id number]  Today's Date: 4/17/2024    Admit Date: 4/15/2024        Discharge Needs Assessment       Row Name 04/17/24 1114       Living Environment    People in Home spouse    Current Living Arrangements home    Primary Care Provided by [facility]    Quality of Family Relationships supportive    Able to Return to Prior Arrangements yes       Transition Planning    Patient/Family Anticipates Transition to home       Discharge Needs Assessment    Equipment Currently Used at Home cane, straight    Concerns to be Addressed denies needs/concerns at this time    Discharge Coordination/Progress Spoke with patient for dc needs. Patient lives with spouse and independent for ADL's. Has pcp and Rx coverage. Plans to dc home home tomorrow and denies needs.                   Discharge Plan       Row Name 04/17/24 1103       Plan    Final Discharge Disposition Code 01 - home or self-care                  Continued Care and Services - Admitted Since 4/15/2024    No active coordination exists for this encounter.       Expected Discharge Date and Time       Expected Discharge Date Expected Discharge Time    Apr 17, 2024            Demographic Summary    No documentation.                  Functional Status    No documentation.                  Psychosocial    No documentation.                  Abuse/Neglect    No documentation.                  Legal    No documentation.                  Substance Abuse    No documentation.                  Patient Forms    No documentation.                     Merlina A Fletcher, RN

## 2024-04-17 NOTE — PROGRESS NOTES
[facility] -  Progress Note     LOS: 0 days   Patient Care Team:  Kaleb Villavicencio MD as PCP - General  Kaleb Villavicencio MD as PCP - Family Medicine  Mani Gary MD as Consulting Physician (Urology)  Mariely Desir APRN as Referring Physician (Family Medicine)  Jeremie Lala MD as Cardiologist (Cardiology)  Antonio Salgado MD as Consulting Physician (Gastroenterology)  Domenic Smith PA as Physician Assistant (Urology)  Jenny Linares APRN as Nurse Practitioner (Family Medicine)    Chief Complaint: follow up s/p PCI, afib, rvr     Subjective     Interval History:     Patient Complaints:     The patient presented 4/15 for cardiac catheterization.  Initially his chest discomfort had resolved with Ranexa but after dose increased to 1000 mg twice daily he called back with worsening chest pain.  Therefore, Dr. Lala recommended cardiac catheterization.    See details in cath report, but ultimately he underwent PCI to ramus branch 4/15 with 1 drug-eluting stent.  He has a normal LVEF and normal LVEDP.    Dr. Lala also follows him for paroxysmal atrial fibrillation and PSVT.  He was in normal sinus rhythm until 8 AM 4/16 when he went into rapid atrial fibrillation with heart rates reaching 180s at times.  He was symptomatic with shortness of breath and chest heaviness when his heart rate is significantly elevated (seems to correlate with heart rates 150s or above).    Therefore, yesterday we treated him with IV diltiazem bolus and drip and increased his diltiazem CD from 240 mg daily to 360 mg daily.  Lopressor 25 mg twice daily was continued.  Given the symptomatic rapid atrial fibrillation he was not discharged as originally planned.    Review of telemetry today reveals he remains in atrial fibrillation but his heart rate was well-controlled overnight-60s to low 100s.  As of this morning around 8 AM resting heart rates are in the 110s to 130s and up to 150s briefly with  activity.  Per my discussion with the patient he did have some shortness of breath with his more rapid heart rates when getting up to the bathroom this morning.  He denies any chest pain.  He is otherwise without complaint.    The diltiazem drip was weaned off last night around 11 PM.    He will be on triple therapy for 2 weeks and then discontinue aspirin.        Review of Systems:     Review of Systems   Constitutional:  Negative for diaphoresis, fatigue, fever and unexpected weight change.   HENT:  Negative for nosebleeds.    Respiratory:  Positive for shortness of breath. Negative for apnea, cough, chest tightness and wheezing.    Cardiovascular:  Negative for chest pain, palpitations and leg swelling.   Gastrointestinal:  Negative for abdominal distention, nausea and vomiting.   Genitourinary:  Negative for hematuria.   Musculoskeletal:  Negative for gait problem.   Skin:  Negative for color change.   Neurological:  Negative for dizziness, syncope, weakness and light-headedness.     Objective     Vital Sign Min/Max for last 24 hours  Temp  Min: 97.1 °F (36.2 °C)  Max: 98.5 °F (36.9 °C)   BP  Min: 106/67  Max: 139/90   Pulse  Min: 60  Max: 187   Resp  Min: 16  Max: 18   SpO2  Min: 94 %  Max: 97 %   No data recorded   No data recorded         04/15/24  1300   Weight: 77.8 kg (171 lb 9.6 oz)       Physical Exam:    Vitals and nursing note reviewed.   Constitutional:       General: Not in acute distress.     Appearance: Well-developed and not in distress. Not diaphoretic.   Neck:      Vascular: No JVD.   Pulmonary:      Effort: Pulmonary effort is normal. No respiratory distress.      Breath sounds: Normal breath sounds.   Cardiovascular:      Tachycardia present. Irregularly irregular rhythm.      Murmurs: There is no murmur.   Edema:     Peripheral edema absent.   Abdominal:      Tenderness: There is no abdominal tenderness.   Skin:     General: Skin is warm and dry.   Neurological:      Mental Status: Alert and  oriented to person, place, and time.       Results Review:   Lab Results (last 72 hours)       Procedure Component Value Units Date/Time    Basic Metabolic Panel [[id number]]  (Abnormal) Collected: 04/16/24 0413    Specimen: Blood Updated: 04/16/24 0543     Glucose 102 mg/dL      BUN 25 mg/dL      Creatinine 1.45 mg/dL      Sodium 140 mmol/L      Potassium 3.8 mmol/L      Chloride 107 mmol/L      CO2 22.0 mmol/L      Calcium 9.4 mg/dL      BUN/Creatinine Ratio 17.2     Anion Gap 11.0 mmol/L      eGFR 48.1 mL/min/1.73     Narrative:      GFR Normal >60  Chronic Kidney Disease <60  Kidney Failure <15    The GFR formula is only valid for adults with stable renal function between ages 18 and 70.    Lipid Panel [[id number]]  (Abnormal) Collected: 04/16/24 0413    Specimen: Blood Updated: 04/16/24 0543     Total Cholesterol 138 mg/dL      Triglycerides 72 mg/dL      HDL Cholesterol 39 mg/dL      LDL Cholesterol  85 mg/dL      VLDL Cholesterol 14 mg/dL      LDL/HDL Ratio 2.17    Narrative:      Cholesterol Reference Ranges  (U.S. Department of Health and Human Services ATP III Classifications)    Desirable          <200 mg/dL  Borderline High    200-239 mg/dL  High Risk          >240 mg/dL      Triglyceride Reference Ranges  (U.S. Department of Health and Human Services ATP III Classifications)    Normal           <150 mg/dL  Borderline High  150-199 mg/dL  High             200-499 mg/dL  Very High        >500 mg/dL    HDL Reference Ranges  (U.S. Department of Health and Human Services ATP III Classifications)    Low     <40 mg/dl (major risk factor for CHD)  High    >60 mg/dl ('negative' risk factor for CHD)        LDL Reference Ranges  (U.S. Department of Health and Human Services ATP III Classifications)    Optimal          <100 mg/dL  Near Optimal     100-129 mg/dL  Borderline High  130-159 mg/dL  High             160-189 mg/dL  Very High        >189 mg/dL    Hemoglobin A1c [[id number]]  (Normal) Collected: 04/16/24 0413  "   Specimen: Blood Updated: 04/16/24 0534     Hemoglobin A1C 5.50 %     Narrative:      Hemoglobin A1C Ranges:    Increased Risk for Diabetes  5.7% to 6.4%  Diabetes                     >= 6.5%  Diabetic Goal                < 7.0%    CBC (No Diff) [[id number]]  (Abnormal) Collected: 04/16/24 0413    Specimen: Blood Updated: 04/16/24 0527     WBC 19.23 10*3/mm3      RBC 3.84 10*6/mm3      Hemoglobin 11.8 g/dL      Hematocrit 36.2 %      MCV 94.3 fL      MCH 30.7 pg      MCHC 32.6 g/dL      RDW 12.9 %      RDW-SD 43.8 fl      MPV 11.0 fL      Platelets 258 10*3/mm3     POC Activated Clotting Time [[id number]]  (Abnormal) Collected: 04/15/24 1118    Specimen: Blood Updated: 04/15/24 1355     Activated Clotting Time  358 Seconds      Comment: Serial Number: [id number]:  [id number]       Basic Metabolic Panel [[id number]]  (Abnormal) Collected: 04/15/24 0819    Specimen: Blood Updated: 04/15/24 0854     Glucose 143 mg/dL      BUN 24 mg/dL      Creatinine 1.57 mg/dL      Sodium 140 mmol/L      Potassium 4.3 mmol/L      Comment: Slight hemolysis detected by analyzer. Result may be falsely elevated.        Chloride 104 mmol/L      CO2 23.0 mmol/L      Calcium 9.6 mg/dL      BUN/Creatinine Ratio 15.3     Anion Gap 13.0 mmol/L      eGFR 43.7 mL/min/1.73     Narrative:      GFR Normal >60  Chronic Kidney Disease <60  Kidney Failure <15    The GFR formula is only valid for adults with stable renal function between ages 18 and 70.    CBC (No Diff) [[id number]]  (Normal) Collected: 04/15/24 0819    Specimen: Blood Updated: 04/15/24 0837     WBC 10.56 10*3/mm3      RBC 4.22 10*6/mm3      Hemoglobin 13.0 g/dL      Hematocrit 39.2 %      MCV 92.9 fL      MCH 30.8 pg      MCHC 33.2 g/dL      RDW 12.7 %      RDW-SD 42.7 fl      MPV 10.2 fL      Platelets 298 10*3/mm3                 Echo EF Estimated  No results found for: \"ECHOEFEST\"      Cath Ejection Fraction Quantitative  No results found for: \"CATHEF\"        Medication Review: " yes  Current Facility-Administered Medications   Medication Dose Route Frequency Provider Last Rate Last Admin    acetaminophen (TYLENOL) tablet 650 mg  650 mg Oral Q4H PRN Jeremie Lala MD        amiodarone 150 mg in 100 mL D5W (loading dose)  150 mg Intravenous Once Jenny Linares APRN   150 mg at 04/17/24 1054    Followed by    amiodarone 360 mg in 200 mL D5W infusion  1 mg/min Intravenous Continuous Jenny Linares APRN        Followed by    amiodarone 360 mg in 200 mL D5W infusion  0.5 mg/min Intravenous Continuous Jenny Linares APRN        apixaban (ELIQUIS) tablet 2.5 mg  2.5 mg Oral Q12H Jeremie Lala MD   2.5 mg at 04/17/24 0800    aspirin EC tablet 81 mg  81 mg Oral Daily Jeremie Lala MD   81 mg at 04/17/24 0800    atorvastatin (LIPITOR) tablet 40 mg  40 mg Oral Nightly Jenny Linares APRN   40 mg at 04/16/24 2013    baclofen (LIORESAL) tablet 10 mg  10 mg Oral Nightly Jeremie Lala MD   10 mg at 04/16/24 2014    clopidogrel (PLAVIX) tablet 75 mg  75 mg Oral Daily Jeremie Lala MD   75 mg at 04/16/24 1732    dilTIAZem CD (CARDIZEM CD) 24 hr capsule 360 mg  360 mg Oral Daily Jenny Linares APRN   360 mg at 04/17/24 0800    metoprolol tartrate (LOPRESSOR) tablet 25 mg  25 mg Oral BID Jeremie Lala MD   25 mg at 04/17/24 0800    nitroglycerin (NITROSTAT) SL tablet 0.4 mg  0.4 mg Sublingual Q5 Min PRN Jeremie Lala MD        pantoprazole (PROTONIX) EC tablet 40 mg  40 mg Oral Q AM Jeremie Lala MD   40 mg at 04/17/24 0511    tamsulosin (FLOMAX) 24 hr capsule 0.4 mg  0.4 mg Oral Daily Jeremie Lala MD   0.4 mg at 04/17/24 0800    traMADol (ULTRAM) tablet 50 mg  50 mg Oral Q8H PRN Jeremie Lala MD   50 mg at 04/17/24 0800     4/15/2024 cath report:    Impression:  1. Single vessel coronary artery disease, with an 80% stenosis and a medium to large caliber ramus intermediate branch.  2. Successful percutaneous coronary intervention the ramus intermediate using a 3.0x23 Xience  "drug-eluting stent.  3. Normal LVEF with normal LVEDP     Plan:   1. TR band off in 2 hours; plan for observation with telemetry overnight and likely discharge home tomorrow  2. Continue aspirin 81mg daily indefinitely, and clopidogrel 75 mg daily for at least 6 months. Will receive a transitionary \"loading\" dose tonight with range milligrams p.o. x 1, then start 75 mg p.o. daily tomorrow.  3. Aggressive risk factor modification, including high potency statin, ACE inhibitor, and beta-blocker (if blood pressure and heart rate will allow).  4. Cardiac rehab referral.  6. Follow-up with me in 4 weeks, and PCP in 1-2 weeks.    Jeremie Lala MD       Assessment & Plan     1.  [facility]: Stable.  He cannot really appreciate improvement in the way he feels in regards to this just yet, but is not having any chest discomfort or shortness of breath when his heart rate is controlled.    -He will continue Eliquis, aspirin and Plavix for 2 weeks and then discontinue aspirin.  At the 6 month post PCI owen, we will plan to transition from Plavix to aspirin and continue Eliquis  -Continue atorvastatin 40 mg nightly- started yesterday   -Continue beta-blocker  -Follow-up with Dr. Lala in 4 weeks  -Cardiac rehab 2 weeks    2.  Paroxysmal atrial fibrillation, with rapid ventricular response: Resting heart rates are improved today since increasing oral diltiazem yesterday but heart rates do reach 150s with minimal exertion and he has associated shortness of breath.  Therefore, after discussion with Dr. Lala we will start him on amiodarone-IV load per protocol.  Continue diltiazem  mg daily (dose increased from 240 mg yesterday) and Lopressor 25 mg twice daily for rate control.  Continue Eliquis for stroke prophylaxis.        Jenny Linares, APRN  04/17/24  10:57 CDT             "

## 2024-04-18 ENCOUNTER — TELEPHONE (OUTPATIENT)
Dept: CARDIOLOGY | Facility: CLINIC | Age: 84
End: 2024-04-18

## 2024-04-18 NOTE — TELEPHONE ENCOUNTER
Caller: WADE    Relationship: SELF    Best call back number: [phone]    What is the best time to reach you: ANY    Who are you requesting to speak with (clinical staff, provider,  specific staff member): CLINICAL    Do you know the name of the person who called:     What was the call regarding: PATIENT CALLING IN ABOUT MEDICATION CONCERNS. - AMIODARONE - THAT PATIENT WAS ON IN THE HOSPITAL. PATIENT PHARMACY WONT HAVE THE SCRIPT DONE UNTIL 11A AND PATIENT IS CONCERNED DUE TO THEY WERE TAKING THE MEDICINE IN THE EARLY MORNING AT THE HOSPITAL. PLEASE CALL PATIENT TO ADVISE IF TAKING IN THE AFTERNOON OR EVENING WILL BE AN ISSUE.     PATIENT ALSO DIDN'T REALIZE THAT THE PROVIDER PUT THEM ON DILTIAZEM CD AS WELL.     THANK YOU!    Is it okay if the provider responds through MyChart: NO

## 2024-04-22 LAB
QT INTERVAL: 376 MS
QT INTERVAL: 396 MS
QTC INTERVAL: 414 MS
QTC INTERVAL: 421 MS

## 2024-04-22 NOTE — TELEPHONE ENCOUNTER
Notified patient. He voiced understanding and appreciation. ABHI Wright Martin G, MD  You4 hours ago (10:54 AM)       Not an issue to take it a little late

## 2024-05-15 ENCOUNTER — OFFICE VISIT (OUTPATIENT)
Dept: CARDIOLOGY | Facility: CLINIC | Age: 84
End: 2024-05-15
Payer: MEDICARE

## 2024-05-15 VITALS
DIASTOLIC BLOOD PRESSURE: 77 MMHG | WEIGHT: 171 LBS | HEIGHT: 72 IN | HEART RATE: 58 BPM | OXYGEN SATURATION: 98 % | SYSTOLIC BLOOD PRESSURE: 137 MMHG | BODY MASS INDEX: 23.16 KG/M2

## 2024-05-15 DIAGNOSIS — E78.2 MIXED HYPERLIPIDEMIA: ICD-10-CM

## 2024-05-15 DIAGNOSIS — I48.0 PAROXYSMAL ATRIAL FIBRILLATION: ICD-10-CM

## 2024-05-15 DIAGNOSIS — I25.118 CORONARY ARTERY DISEASE OF NATIVE ARTERY OF NATIVE HEART WITH STABLE ANGINA PECTORIS: Primary | ICD-10-CM

## 2024-05-15 DIAGNOSIS — I73.9 PVD (PERIPHERAL VASCULAR DISEASE) WITH CLAUDICATION: ICD-10-CM

## 2024-05-15 DIAGNOSIS — I10 PRIMARY HYPERTENSION: ICD-10-CM

## 2024-05-15 NOTE — PROGRESS NOTES
Subjective:     Encounter Date:05/15/2024      Patient ID: Fabian Velez Sr. is a 83 y.o. male.    Chief Complaint: Follow-up on cardiac catheterization with PCI to a ramus branch.   History of Present Illness    Mr. Velez comes today after undergoing heart catheterization I performed on 04/15/2024 that revealed an 80% stenosis, and a large caliber ramus intermediate branch. It was successfully treated with 3.0 x 23 mm drug-eluting stent. Prior to that, he was having persistent episodes of chest pain, partially relieved by maximally tolerated antianginal medicine. He had a stress test that was low risk for ischemia, but I suspected balanced ischemia, and proceeded with the catheterization, particularly when symptoms worsen again after initial improvement on antianginal therapy. He also did wear a 2-week ZIO patch monitor placed on 04/17/2024, that showed atrial fibrillation with an overall burden of less than 1%. The longest episode was 89 minutes, but all others were 10 minutes or less. The patient never triggered the device during his symptoms, but he did report chest pain 14 times. He denies any chest pain occurring in conjunction with arrhythmias.    Today, the patient reports an improvement in his condition and exertional dyspnea since his last visit. He no longer feels winded with ambulation around the house. He denies experiencing any chest pain or palpitations. He has been compliant with taking his blood pressure medications, noting that his heart rate and blood pressure have consistently remained within normal limits. The patient has not been scheduled for cardiac rehabilitation. His current medication regimen includes Eliquis, and Plavix. He does report some bruising present.    The patient complains of cramping pain in his bilateral lower extremities during ambulation, with the right side affected greater than the left. He also notes an intermittent numbness in his leg. His wife has noted concern  that he needs to evaluate the blood flow in his lower extremities. The patient has not consulted with any vascular surgeon in the past. He suspects that the pain may be originating from his back.      The following portions of the patient's history were reviewed and updated as appropriate: allergies, current medications, past family history, past medical history, past social history, past surgical history, and problem list.    Review of Systems   Constitutional: Negative for malaise/fatigue.   Cardiovascular:  Negative for chest pain, claudication, dyspnea on exertion, leg swelling, near-syncope, orthopnea, palpitations, paroxysmal nocturnal dyspnea and syncope.   Respiratory:  Negative for shortness of breath.    Hematologic/Lymphatic: Does not bruise/bleed easily.   Musculoskeletal:         Claudication (right worse than left).           Current Outpatient Medications:     acetaminophen (TYLENOL) 500 MG tablet, Take 2 tablets by mouth Every 6 (Six) Hours As Needed for Mild Pain., Disp: , Rfl:     amiodarone (PACERONE) 200 MG tablet, Take 1 tablet by mouth Daily. TAKE 400 MG TWICE A DAY FOR 14 DAYS THEN REDUCE  MG ONCE A DAY, Disp: 72 tablet, Rfl: 5    apixaban (ELIQUIS) 2.5 MG tablet tablet, Take 1 tablet by mouth Every 12 (Twelve) Hours. Indications: Atrial Fibrillation, Disp: 60 tablet, Rfl: 0    atorvastatin (LIPITOR) 40 MG tablet, Take 1 tablet by mouth Every Night., Disp: 90 tablet, Rfl: 3    baclofen (LIORESAL) 10 MG tablet, Take 1 tablet by mouth every night at bedtime., Disp: , Rfl:     Cholecalciferol (VITAMIN D3) 125 MCG (5000 UT) capsule capsule, Take 1 capsule by mouth Daily., Disp: , Rfl:     clopidogrel (PLAVIX) 75 MG tablet, Take 1 tablet by mouth Daily., Disp: 30 tablet, Rfl: 5    dilTIAZem CD (CARDIZEM CD) 360 MG 24 hr capsule, Take 1 capsule by mouth Daily., Disp: 30 capsule, Rfl: 11    gemfibrozil (LOPID) 600 MG tablet, Take 1 tablet by mouth Daily., Disp: , Rfl:     metoprolol tartrate  (LOPRESSOR) 25 MG tablet, Take 1 tablet by mouth 2 (Two) Times a Day., Disp: 180 tablet, Rfl: 3    nitroglycerin (NITROSTAT) 0.4 MG SL tablet, Place 1 tablet under the tongue Every 5 (Five) Minutes As Needed for Chest Pain (Systolic BP Greater Than 100). Take no more than 3 doses in 15 minutes., Disp: 25 tablet, Rfl: 1    omeprazole (priLOSEC) 20 MG capsule, Take 1 capsule by mouth Daily., Disp: 90 capsule, Rfl: 0    tamsulosin (FLOMAX) 0.4 MG capsule 24 hr capsule, Take 1 capsule by mouth Daily., Disp: 90 capsule, Rfl: 3    traMADol (ULTRAM) 50 MG tablet, Take 1 tablet by mouth Every 8 (Eight) Hours As Needed for Moderate Pain., Disp: , Rfl:        Objective:      Vitals:    05/15/24 1331   BP: 137/77   Pulse: 58   SpO2: 98%     Vitals and nursing note reviewed.   Constitutional:       General: Not in acute distress.     Appearance: Not in distress.   Neck:      Vascular: No JVD or JVR. JVD normal.   Pulmonary:      Effort: Pulmonary effort is normal.      Breath sounds: Normal breath sounds.   Cardiovascular:      Normal rate. Regular rhythm.      Murmurs: There is no murmur.      No gallop.  No rub.   Pulses:     Intact distal pulses.   Edema:     Peripheral edema absent.   Skin:     General: Skin is warm and dry.   Neurological:      Mental Status: Alert, oriented to person, place, and time and oriented to person, place and time.         Lab Review:         ECG 12 Lead    Date/Time: 5/15/2024 1:41 PM  Performed by: Jeremie Lala MD    Authorized by: Jeremie Lala MD  Comparison: compared with previous ECG from 4/17/2024  Comparison to previous ECG: Ventricular rate has declined slightly, otherwise no significant change.  Rhythm: sinus bradycardia  BPM: 58    Clinical impression: normal ECG            Assessment/Plan:     Problem List Items Addressed This Visit (all established and stable, except for otherwise noted)      Atrial fibrillation    Overview     Diagnosed Jan '20 - was symptomatic at time of  diagnosis with AF/RVR (rate 150s) - was light-headed    CHADS-VASc Risk Assessment              3       Total Score        1 Hypertension    2 Age >/= 75        Criteria that do not apply:    CHF    DM    PRIOR STROKE/TIA/THROMBO    Vascular Disease    Age 65-74    Sex: Female                   Hypertension    Hyperlipidemia    Coronary artery disease of native artery of native heart with stable angina pectoris - Primary    Overview     4/15/24: [facility] with 80% ramus lesion -> tx with 3.0x23 DARIA and symptoms resolved          Other Visit Diagnoses       PVD (peripheral vascular disease) with claudication   : New, workup planned    Relevant Orders    US Ankle / Brachial Indices Extremity Complete              Recommendations/plans:    1. Coronary artery disease: Mr. Velez is doing very well today, one month removed from PCI to a severe stenosis of a ramus branch. His symptoms beforehand of exertional dyspnea and chest pain have resolved.   - Continue clopidogrel for now, along with Eliquis for reasons listed below. We will plan on seeing him back in 5 months (6 months from the PCI) and transition him from clopidogrel back to aspirin at that time. After a total of 1 year of antiplatelet therapy, he can discontinue it altogether as long as he remains on Eliquis for an anticoagulant.   - Continue high intensity statin.     2. Paroxysmal atrial fibrillation: Patient was hospitalized for two nights after PCI because of difficult to control rates with significant burden of paroxysmal atrial fibrillation. He was started on amiodarone then to suppress this, and has been tolerating oral amiodarone at home. Subsequent monitor showed very low burden of overall less than 1% of atrial fibrillation.   - Continue amiodarone 200 mg daily for now; may consider discontinuation at next visit if no significant burden of recurrent AF  - Continue Eliquis indefinitely for CVA prophylaxis.     3. Essential hypertension: Well controlled.      4. Mixed hyperlipidemia: Goal LDL <70. Continue high intensity statin.     5. Claudication: New complaint. We will investigate with ABIs. We will call patient with the results.    Follow-up  The patient is scheduled for a follow-up visit in 5 months.      Transcribed from ambient dictation for Jeremie Lala MD by Ada Cervantes.  05/15/24   16:27 CDT    Patient or patient representative verbalized consent to the visit recording.      I Jeremie Lala MD have personally performed the services described in this document as scribed by the above individual, and it is both accurate and complete.   I have edited each component as needed.    Jeremie Lala MD  5/17/2024  08:44 CDT

## 2024-05-17 NOTE — TELEPHONE ENCOUNTER
Please send 90 day supply to [facility] pharmacy per patient request. Thank you. Jyotsna Cabrera MA

## 2024-05-20 RX ORDER — CLOPIDOGREL BISULFATE 75 MG/1
75 TABLET ORAL DAILY
Qty: 90 TABLET | Refills: 3 | Status: SHIPPED | OUTPATIENT
Start: 2024-05-20

## 2024-05-20 RX ORDER — AMIODARONE HYDROCHLORIDE 200 MG/1
TABLET ORAL
Qty: 90 TABLET | Refills: 3 | Status: SHIPPED | OUTPATIENT
Start: 2024-05-20

## 2024-05-20 RX ORDER — ATORVASTATIN CALCIUM 40 MG/1
40 TABLET, FILM COATED ORAL NIGHTLY
Qty: 90 TABLET | Refills: 3 | Status: SHIPPED | OUTPATIENT
Start: 2024-05-20

## 2024-05-22 ENCOUNTER — HOSPITAL ENCOUNTER (OUTPATIENT)
Dept: ULTRASOUND IMAGING | Facility: HOSPITAL | Age: 84
Discharge: HOME | End: 2024-05-22
Admitting: INTERNAL MEDICINE
Payer: MEDICARE

## 2024-05-22 DIAGNOSIS — I73.9 PVD (PERIPHERAL VASCULAR DISEASE) WITH CLAUDICATION: ICD-10-CM

## 2024-05-22 PROCEDURE — 93923 UPR/LXTR ART STDY 3+ LVLS: CPT

## 2024-05-23 DIAGNOSIS — I73.9 PVD (PERIPHERAL VASCULAR DISEASE) WITH CLAUDICATION: Primary | ICD-10-CM

## 2024-05-23 DIAGNOSIS — I70.213 ATHEROSCLEROSIS OF NATIVE ARTERIES OF EXTREMITIES WITH INTERMITTENT CLAUDICATION, BILATERAL LEGS: ICD-10-CM

## 2024-05-23 DIAGNOSIS — Z91.041 CONTRAST MEDIA ALLERGY: ICD-10-CM

## 2024-05-23 DIAGNOSIS — R68.89 ABNORMAL ANKLE BRACHIAL INDEX (ABI): ICD-10-CM

## 2024-05-23 RX ORDER — DIPHENHYDRAMINE HYDROCHLORIDE 50 MG/ML
50 INJECTION INTRAMUSCULAR; INTRAVENOUS
OUTPATIENT
Start: 2024-05-23 | End: 2024-05-24

## 2024-05-23 RX ORDER — DIPHENHYDRAMINE HCL 25 MG
50 TABLET ORAL
OUTPATIENT
Start: 2024-05-23 | End: 2024-05-23

## 2024-05-23 RX ORDER — PREDNISONE 50 MG/1
50 TABLET ORAL TAKE AS DIRECTED
Qty: 3 TABLET | Refills: 0 | Status: SHIPPED | OUTPATIENT
Start: 2024-05-23 | End: 2024-05-24 | Stop reason: SDUPTHER

## 2024-05-24 RX ORDER — PREDNISONE 50 MG/1
50 TABLET ORAL TAKE AS DIRECTED
Qty: 3 TABLET | Refills: 0 | Status: SHIPPED | OUTPATIENT
Start: 2024-05-24

## 2024-06-06 ENCOUNTER — TELEPHONE (OUTPATIENT)
Dept: CARDIOLOGY | Facility: CLINIC | Age: 84
End: 2024-06-06
Payer: MEDICARE

## 2024-06-06 ENCOUNTER — HOSPITAL ENCOUNTER (OUTPATIENT)
Dept: CT IMAGING | Facility: HOSPITAL | Age: 84
Discharge: HOME | End: 2024-06-06
Payer: MEDICARE

## 2024-06-06 DIAGNOSIS — I70.213 ATHEROSCLEROSIS OF NATIVE ARTERIES OF EXTREMITIES WITH INTERMITTENT CLAUDICATION, BILATERAL LEGS: ICD-10-CM

## 2024-06-06 DIAGNOSIS — R68.89 ABNORMAL ANKLE BRACHIAL INDEX (ABI): ICD-10-CM

## 2024-06-06 DIAGNOSIS — I73.9 PVD (PERIPHERAL VASCULAR DISEASE) WITH CLAUDICATION: ICD-10-CM

## 2024-06-06 LAB — CREAT BLDA-MCNC: 1.9 MG/DL (ref 0.6–1.3)

## 2024-06-06 PROCEDURE — 75635 CT ANGIO ABDOMINAL ARTERIES: CPT

## 2024-06-06 PROCEDURE — A9270 NON-COVERED ITEM OR SERVICE: HCPCS | Performed by: RADIOLOGY

## 2024-06-06 PROCEDURE — 25510000001 IOPAMIDOL PER 1 ML: Performed by: INTERNAL MEDICINE

## 2024-06-06 PROCEDURE — 63710000001 DIPHENHYDRAMINE PER 50 MG: Performed by: RADIOLOGY

## 2024-06-06 PROCEDURE — 82565 ASSAY OF CREATININE: CPT

## 2024-06-06 RX ORDER — DIPHENHYDRAMINE HCL 50 MG
50 CAPSULE ORAL ONCE
Status: COMPLETED | OUTPATIENT
Start: 2024-06-06 | End: 2024-06-06

## 2024-06-06 RX ADMIN — DIPHENHYDRAMINE HYDROCHLORIDE 50 MG: 25 CAPSULE ORAL at 12:42

## 2024-06-06 RX ADMIN — IOPAMIDOL 100 ML: 755 INJECTION, SOLUTION INTRAVENOUS at 15:01

## 2024-06-06 NOTE — TELEPHONE ENCOUNTER
"Patient calls today frustrated about several things:  -he was told his test today was at [facility], but when he arrived he was told it would have to be done at the hospital  -when he arrived at the hospital he was asked if he had taken his pills (prednisone and benadryl for contrast allergy). He confirmed that he had taken prednisone, but was not instructed about benadryl  -he was given benadryl, but was told he would have to wait another hour before his test could be done.    Patient states he has been fasting all day and now has to wait another hour for his test. I apologized several times throughout the conversation. He said, \"I know this isn't your fault, but I'm just aggravated about all this. Thank you for calling me back.\" Jyotsna Cabrera MA    "

## 2024-06-11 ENCOUNTER — TELEPHONE (OUTPATIENT)
Dept: CARDIOLOGY | Facility: CLINIC | Age: 84
End: 2024-06-11

## 2024-06-11 NOTE — TELEPHONE ENCOUNTER
Caller: Fabian Velez Sr.    Relationship: Self    Best call back number: [phone]    Caller requesting test results: PATIENT     What test was performed: CT ANGIO    When was the test performed: 6/6/24    Where was the test performed: [facility]     Additional notes: PLEASE CALL PATIENT WITH RESULTS

## 2024-06-11 NOTE — TELEPHONE ENCOUNTER
Notified patient that Dr Lala has not given me the results yet, but as soon as he does I will contact the patient. He voiced understanding and appreciation.Jyotsna Cabrera MA

## 2024-06-12 DIAGNOSIS — I70.201 RIGHT POPLITEAL ARTERY OCCLUSION: Primary | ICD-10-CM

## 2024-06-24 ENCOUNTER — TELEPHONE (OUTPATIENT)
Dept: VASCULAR SURGERY | Facility: CLINIC | Age: 84
End: 2024-06-24
Payer: MEDICARE

## 2024-06-25 ENCOUNTER — OFFICE VISIT (OUTPATIENT)
Dept: VASCULAR SURGERY | Facility: CLINIC | Age: 84
End: 2024-06-25
Payer: MEDICARE

## 2024-06-25 VITALS
WEIGHT: 176 LBS | SYSTOLIC BLOOD PRESSURE: 132 MMHG | BODY MASS INDEX: 23.84 KG/M2 | HEART RATE: 68 BPM | OXYGEN SATURATION: 94 % | HEIGHT: 72 IN | DIASTOLIC BLOOD PRESSURE: 68 MMHG

## 2024-06-25 DIAGNOSIS — N18.9 CHRONIC KIDNEY DISEASE, UNSPECIFIED CKD STAGE: ICD-10-CM

## 2024-06-25 DIAGNOSIS — I73.9 CLAUDICATION: ICD-10-CM

## 2024-06-25 DIAGNOSIS — Z51.81 ENCOUNTER FOR MONITORING ANTIPLATELET THERAPY: ICD-10-CM

## 2024-06-25 DIAGNOSIS — Z91.041 ALLERGY TO INTRAVENOUS CONTRAST: ICD-10-CM

## 2024-06-25 DIAGNOSIS — Z79.02 ENCOUNTER FOR MONITORING ANTIPLATELET THERAPY: ICD-10-CM

## 2024-06-25 DIAGNOSIS — Z01.818 PREOP TESTING: ICD-10-CM

## 2024-06-25 DIAGNOSIS — I73.9 PAD (PERIPHERAL ARTERY DISEASE): Primary | ICD-10-CM

## 2024-06-25 PROBLEM — I70.201: Status: ACTIVE | Noted: 2024-06-25

## 2024-06-25 PROCEDURE — 99214 OFFICE O/P EST MOD 30 MIN: CPT | Performed by: SURGERY

## 2024-06-25 PROCEDURE — 3075F SYST BP GE 130 - 139MM HG: CPT | Performed by: SURGERY

## 2024-06-25 PROCEDURE — 1159F MED LIST DOCD IN RCRD: CPT | Performed by: SURGERY

## 2024-06-25 PROCEDURE — 1160F RVW MEDS BY RX/DR IN RCRD: CPT | Performed by: SURGERY

## 2024-06-25 PROCEDURE — 3078F DIAST BP <80 MM HG: CPT | Performed by: SURGERY

## 2024-06-25 RX ORDER — RANOLAZINE 500 MG/1
1 TABLET, EXTENDED RELEASE ORAL EVERY 12 HOURS SCHEDULED
COMMUNITY
Start: 2024-05-25

## 2024-06-25 RX ORDER — SODIUM CHLORIDE 9 MG/ML
100 INJECTION, SOLUTION INTRAVENOUS CONTINUOUS
OUTPATIENT
Start: 2024-06-25 | End: 2024-06-29

## 2024-06-25 RX ORDER — FAMOTIDINE 40 MG/1
20 TABLET, FILM COATED ORAL 2 TIMES DAILY
COMMUNITY
Start: 2024-06-19

## 2024-06-25 RX ORDER — DIPHENHYDRAMINE HCL 50 MG
50 CAPSULE ORAL ONCE
Qty: 1 CAPSULE | Refills: 0 | Status: SHIPPED | OUTPATIENT
Start: 2024-06-25 | End: 2024-06-25

## 2024-06-25 RX ORDER — PREDNISONE 50 MG/1
50 TABLET ORAL AS NEEDED
Qty: 3 TABLET | Refills: 0 | Status: SHIPPED | OUTPATIENT
Start: 2024-06-25

## 2024-06-25 RX ORDER — DILTIAZEM HYDROCHLORIDE 240 MG/1
240 CAPSULE, COATED, EXTENDED RELEASE ORAL DAILY
OUTPATIENT
Start: 2024-06-25

## 2024-06-25 RX ORDER — DIPHENHYDRAMINE HCL 50 MG
CAPSULE ORAL
Qty: 1 CAPSULE | Refills: 0 | Status: SHIPPED | OUTPATIENT
Start: 2024-06-25

## 2024-06-25 NOTE — PROGRESS NOTES
06/25/2024      Jeremie Lala MD  [address]  [address]  [place] [address]    Fabian Velez Sr.  1940    Chief Complaint   Patient presents with    Bilateral carotid artery stenosis    popliteal artery aneurysm     Foot and ankle and thigh painful with cramps to calf, Cardiac stent recently placed       Dear Jeremie Lala MD    HPI  I had the pleasure of seeing your patient Fabian Velez Sr. in the office today.  Thank you kindly for this consultation.  As you recall, Fabian Velez Sr. is a 83 y.o.  male who you are currently following for coronary artery disease s/p cardiac catheterization with stent placement in April.  He has been having complaints of calf claudication at short distance. He is maintained on Plavix and Eliquis 2.5 mg.  He did have noninvasive testing performed which I did personally review.       Past Medical History:   Diagnosis Date    Arthritis     Atrial fibrillation     Diverticulosis     GERD (gastroesophageal reflux disease)     Hyperlipidemia     Hypertension     Plantar fasciitis     Shingles     Skin cancer     Urticaria        Past Surgical History:   Procedure Laterality Date    CARDIAC CATHETERIZATION N/A 4/15/2024    Procedure: Coronary angiography;  Surgeon: Jeremie Lala MD;  Location:  [facility];  Service: Cardiology;  Laterality: N/A;    CARDIAC CATHETERIZATION N/A 4/15/2024    Procedure: Percutaneous Coronary Intervention;  Surgeon: Jeremie Lala MD;  Location:  [facility];  Service: Cardiology;  Laterality: N/A;    COLONOSCOPY  06/02/2016    5yr colon recall based on prep adequate to identify polyps greater than 6 mm    ENDOSCOPY  06/14/2011    INGUINAL HERNIA REPAIR      REPLACEMENT TOTAL KNEE      SHOULDER SURGERY Bilateral        Family History   Problem Relation Age of Onset    Crohn's disease Son     Heart disease Father     Cancer Mother     Stroke Brother     Colon cancer Neg Hx     Colon polyps Neg Hx         Social History     Socioeconomic History    Marital status:    Tobacco Use    Smoking status: Former    Smokeless tobacco: Former     Types: Chew   Vaping Use    Vaping status: Never Used   Substance and Sexual Activity    Alcohol use: Yes     Alcohol/week: 10.0 - 12.0 standard drinks of alcohol     Types: 10 - 12 Cans of beer per week    Drug use: No    Sexual activity: Defer       Allergies   Allergen Reactions    Contrast Dye (Echo Or Unknown Ct/Mr) Other (See Comments)     flush         Current Outpatient Medications:     acetaminophen (TYLENOL) 500 MG tablet, Take 2 tablets by mouth Every 6 (Six) Hours As Needed for Mild Pain., Disp: , Rfl:     amiodarone (PACERONE) 200 MG tablet, Take 200 mg daily., Disp: 90 tablet, Rfl: 3    apixaban (ELIQUIS) 2.5 MG tablet tablet, Take 1 tablet by mouth Every 12 (Twelve) Hours. Indications: Atrial Fibrillation, Disp: 60 tablet, Rfl: 0    atorvastatin (LIPITOR) 40 MG tablet, Take 1 tablet by mouth Every Night., Disp: 90 tablet, Rfl: 3    baclofen (LIORESAL) 10 MG tablet, Take 1 tablet by mouth every night at bedtime., Disp: , Rfl:     Cholecalciferol (VITAMIN D3) 125 MCG (5000 UT) capsule capsule, Take 1 capsule by mouth Daily., Disp: , Rfl:     clopidogrel (PLAVIX) 75 MG tablet, Take 1 tablet by mouth Daily., Disp: 90 tablet, Rfl: 3    dilTIAZem CD (CARDIZEM CD) 360 MG 24 hr capsule, Take 1 capsule by mouth Daily., Disp: 30 capsule, Rfl: 11    famotidine (PEPCID) 40 MG tablet, Take 0.5 tablets by mouth 2 (Two) Times a Day., Disp: , Rfl:     gemfibrozil (LOPID) 600 MG tablet, Take 1 tablet by mouth Daily., Disp: , Rfl:     metoprolol tartrate (LOPRESSOR) 25 MG tablet, Take 1 tablet by mouth 2 (Two) Times a Day., Disp: 180 tablet, Rfl: 3    nitroglycerin (NITROSTAT) 0.4 MG SL tablet, Place 1 tablet under the tongue Every 5 (Five) Minutes As Needed for Chest Pain (Systolic BP Greater Than 100). Take no more than 3 doses in 15 minutes., Disp: 25 tablet, Rfl: 1     "omeprazole (priLOSEC) 20 MG capsule, Take 1 capsule by mouth Daily., Disp: 90 capsule, Rfl: 0    predniSONE (DELTASONE) 50 MG tablet, Take 1 tablet by mouth Take As Directed for 3 doses. Take 1 tablet (50mg) 13 Hours 7 Hours & 1 Hour Prior to Exam, Disp: 3 tablet, Rfl: 0    ranolazine (RANEXA) 500 MG 12 hr tablet, Take 1 tablet by mouth Every 12 (Twelve) Hours., Disp: , Rfl:     tamsulosin (FLOMAX) 0.4 MG capsule 24 hr capsule, Take 1 capsule by mouth Daily., Disp: 90 capsule, Rfl: 3    traMADol (ULTRAM) 50 MG tablet, Take 1 tablet by mouth Every 8 (Eight) Hours As Needed for Moderate Pain., Disp: , Rfl:     diphenhydrAMINE (BENADRYL) 50 MG capsule, TAKE 1 CAPSULE BY MOUTH 1 HOUR BEFORE PROCEDURE, Disp: 1 capsule, Rfl: 0    predniSONE (DELTASONE) 50 MG tablet, Take 1 tablet by mouth As Needed (see sig). 1 tablet by mouth 24, 12, and 1 hour before procedure, Disp: 3 tablet, Rfl: 0    Review of Systems   Constitutional: Negative.    HENT: Negative.     Eyes: Negative.    Respiratory: Negative.     Cardiovascular: Negative.         Claudication right leg   Gastrointestinal: Negative.    Endocrine: Negative.    Genitourinary: Negative.    Musculoskeletal:  Positive for arthralgias, back pain and gait problem.   Skin: Negative.    Allergic/Immunologic: Negative.    Hematological: Negative.    Psychiatric/Behavioral: Negative.     All other systems reviewed and are negative.    /68   Pulse 68   Ht 182.9 cm (72\")   Wt 79.8 kg (176 lb)   SpO2 94%   BMI 23.87 kg/m²     Physical Exam  Vitals and nursing note reviewed.   Constitutional:       Appearance: He is well-developed.   HENT:      Head: Normocephalic and atraumatic.   Eyes:      General: No scleral icterus.     Pupils: Pupils are equal, round, and reactive to light.   Neck:      Thyroid: No thyromegaly.      Vascular: No carotid bruit or JVD.   Cardiovascular:      Rate and Rhythm: Normal rate and regular rhythm.      Pulses:           Carotid pulses are 2+ " on the right side and 2+ on the left side.       Femoral pulses are 2+ on the right side and 2+ on the left side.       Popliteal pulses are 2+ on the left side.        Dorsalis pedis pulses are 2+ on the left side.        Posterior tibial pulses are 2+ on the left side.      Heart sounds: Normal heart sounds.   Pulmonary:      Effort: Pulmonary effort is normal.      Breath sounds: Normal breath sounds.   Abdominal:      General: Bowel sounds are normal. There is no distension or abdominal bruit.      Palpations: Abdomen is soft. There is no mass.      Tenderness: There is no abdominal tenderness.   Musculoskeletal:         General: Normal range of motion.      Cervical back: Neck supple.   Lymphadenopathy:      Cervical: No cervical adenopathy.   Skin:     General: Skin is warm and dry.   Neurological:      Mental Status: He is alert and oriented to person, place, and time.      Cranial Nerves: No cranial nerve deficit.      Sensory: No sensory deficit.         CT Angio Abdominal Aorta Bilateral Iliofem Runoff    Result Date: 6/7/2024  Narrative: EXAM: CT ANGIO ABDOMINAL AORTA BILAT ILIOFEM RUNOFF- - 6/6/2024 12:47 PM  HISTORY: claudication, abnormal david, R>L; I73.9-Peripheral vascular disease, unspecified; R68.89-Other general symptoms and signs; I70.213-Atherosclerosis of native arteries of extremities with intermittent claudication, bilateral legs   COMPARISON: None.  DOSE LENGTH PRODUCT: 728 mGy cm. Automatic exposure control was utilized to make radiation dose as low as reasonably achievable.  TECHNIQUE: Enhanced  axial images of the abdomen, pelvis and bilateral lower extremities obtained with multiplanar reformats. 3D postprocessing, including MIPs, performed and images saved to PACS.  FINDINGS: VISUALIZED CHEST: Small dependent bibasilar groundglass opacities, favored to represent atelectasis. No pleural or pericardial effusion. Borderline inferior heart size with scattered coronary artery calcifications.   Arterial phase of imaging limits solid organ evaluation.  LIVER: No focally suspicious finding.  BILIARY: Layering density in the gallbladder most likely small stones or sludge. No bile duct dilation.  PANCREAS: Normal pancreas contour and enhancement.  SPLEEN: Normal size and contour.  ADRENAL: Normal appearance of the bilateral adrenal glands.  GENITOURINARY: No hydronephrosis, urolithiasis or solid renal lesion. Urinary bladder is within normal limits. Normal prostate size. Probable vasectomy clips.  PERITONEUM: No free air or ascites.  GI TRACT: Normal configuration of the stomach and duodenum. No abnormally dilated loops of bowel. Colonic diverticula. No evidence of acute diverticulitis. Appendix not discretely identified. No secondary signs of appendicitis.  Thickening versus underdistention of the sigmoid colon on axial series 4, images 165-188.  VESSELS: Aorta. Infrarenal abdominal aorta measures 2.7 x 2.6 cm (AP by transverse) with calcified and noncalcified atherosclerosis. Celiac, conventional hepatic, splenic arteries patent and normal caliber.  Superior mesenteric artery with calcified and noncalcified atherosclerosis at the origin with mild appearing stenosis for a length of 1.9 cm.  Dual RIGHT renal arteries patent with calcified atherosclerosis at the origin and probable mild stenosis.  Single LEFT renal artery with calcified and noncalcified atherosclerosis and severe appearing stenosis at the proximal aspect for a length of 1.7 cm.  Inferior mesenteric artery appears patent.  RIGHT common iliac artery measures up to 1.6 cm for a length of 2.5 cm with calcified and noncalcified atherosclerosis. RIGHT internal iliac artery with short segment calcified and noncalcified atherosclerosis and dilation measuring 1.8 cm. RIGHT external iliac, common femoral, and profundofemoral arteries patent with multifocal mild stenosis.  RIGHT femoral artery with short segment dissection or web on axial series 4, image  327. Multifocal calcified and noncalcified stenosis with mild and moderate appearing stenosis for a length of 5 cm.  RIGHT proximal popliteal artery patent. At the level of the proximal tibia, the popliteal artery is nonopacified for a length of at least 3 cm (sagittal series 9, image 144).  RIGHT anterior tibial, posterior tibial arteries patent into the foot. RIGHT peroneal artery patent to the ankle.  LEFT common iliac, internal iliac, external iliac arteries patent. LEFT common femoral artery dilated measuring 1.9 cm with calcified and noncalcified atherosclerosis. LEFT deep femoral artery patent.  LEFT femoral artery patent with multifocal stenosis, which appears to be moderate to severe for a length of 7 cm on axial series 4, images 338-371 and sagittal series 9, image 74-76.  Visualized portion of the LEFT popliteal artery appears patent, obscured at the level of the knee prosthesis. LEFT peroneal artery patent into the foot. Patchy opacification of the LEFT anterior and posterior tibial arteries throughout their course with the posterior tibial artery reconstituting into the foot.  RETROPERITONEUM: No mass, lymphadenopathy or hemorrhage.  SOFT TISSUES: Moderate fatty atrophy of the bilateral calf musculature. Otherwise overlying soft tissues appear within normal limits.  BONES: No acute or aggressive bony lesion. Bone island in L2, which was also present on 8/3/2012 radiographs.       Impression: 1. RIGHT popliteal artery with at least 3 cm length occlusion, possibly longer, evaluation limited due to metallic artifact at the level of the knee prosthesis. There is two-vessel runoff into the foot. 2. LEFT femoral artery with multifocal moderate to severe stenosis for a length of 7 cm. There is 1-2 vessel runoff into the foot as described above. 3. LEFT renal artery with severe appearing stenosis for a length of 1.7 cm. Additional vascular findings as described in detail above. 4. Sigmoid colon under distention  versus thickening. Consider follow-up with oral contrast or direct visualization. 4. Gallstones or sludge. 5. Groundglass opacities at the bilateral dependent lung bases, favored to represent atelectasis. If the patient has symptoms, infection would be a consideration.  This report was signed and finalized on 6/7/2024 9:09 AM by Dr Brianna Evans MD.        Patient Active Problem List   Diagnosis    Benign prostatic hyperplasia with lower urinary tract symptoms    Poor urinary stream    Elevated prostate specific antigen (PSA)    Gastroesophageal reflux disease    Atrial fibrillation    Hypertension    Low back pain    Encounter for screening for malignant neoplasm of colon    Coagulopathy    Chronic anticoagulation    Hyperlipidemia    PSVT (paroxysmal supraventricular tachycardia)    CKD (chronic kidney disease) stage 3, GFR 30-59 ml/min    Coronary artery disease of native artery of native heart with stable angina pectoris    S/P drug eluting coronary stent placement    Right popliteal artery occlusion        Diagnosis Plan   1. PAD (peripheral artery disease)  CBC and Differential    Basic metabolic panel    APTT    Protime-INR    XR chest 2 vw    Case Request    ceFAZolin (ANCEF) 2,000 mg in sodium chloride 0.9 % 100 mL IVPB    Case Request    sodium chloride 0.9 % infusion      2. Allergy to intravenous contrast  predniSONE (DELTASONE) 50 MG tablet      3. Claudication        4. Preop testing  CBC and Differential    Basic metabolic panel    APTT    Protime-INR    XR chest 2 vw    Case Request    ceFAZolin (ANCEF) 2,000 mg in sodium chloride 0.9 % 100 mL IVPB    Case Request      5. Encounter for monitoring antiplatelet therapy  APTT      6. Chronic kidney disease, unspecified CKD stage  sodium chloride 0.9 % infusion          Plan: After thoroughly evaluating Fabian Velez Sr., I believe the best course of action is to proceed with a right lower extremity angiogram.  He is having calf claudication to the  right lower extremity.  I did review testing which does show right popliteal artery occluded for about 3 cm Dr. Samuels does pick back up below the knee.  His ABIs showed moderate arterial insufficiency to the right and no arterial insufficiency on the left.  Risks of angiogram were discussed.  These include, but are not limited to, bleeding, infection, vessel damage, nerve damage, embolus, and loss of limb.  The patient understands these risks and wishes to proceed with procedure.  He does have chronic kidney disease and will need hydration prior to the procedure.  The patient is to continue taking their medications as previously discussed.   This was all discussed in full with complete understanding.  Thank you for allowing me to participate in the care of your patient.  Please do not hesitate to call with any questions or concerns.  We will keep you aware of any further encounters with Fabian Velez Sr..        Sincerely yours,         DO Saud Valdes Jeffrey L, MD

## 2024-06-25 NOTE — LETTER
June 25, 2024     Kaleb Villavicencio MD  [address] [facility] [address]    Patient: Fabian Velez Sr.   YOB: 1940   Date of Visit: 6/25/2024     Dear Kaleb Villavicencio MD:       Thank you for referring Fabian Velez to me for evaluation. Below are the relevant portions of my assessment and plan of care.    If you have questions, please do not hesitate to call me. I look forward to following Fabian along with you.         Sincerely,        Isaac Ba DO        CC: No Recipients    Isaac Ba DO  06/25/24 1635  Sign when Signing Visit  06/25/2024      Jeremie Lala MD  [address] [address]    Fabian Velez Sr.  1940    Chief Complaint   Patient presents with   • Bilateral carotid artery stenosis   • popliteal artery aneurysm     Foot and ankle and thigh painful with cramps to calf, Cardiac stent recently placed       Dear Jeremie Lala MD    HPI  I had the pleasure of seeing your patient Fabian Velez Sr. in the office today.  Thank you kindly for this consultation.  As you recall, Fabian Velez Sr. is a 83 y.o.  male who you are currently following for coronary artery disease s/p cardiac catheterization with stent placement in April.  He has been having complaints of calf claudication at short distance. He is maintained on Plavix and Eliquis 2.5 mg.  He did have noninvasive testing performed which I did personally review.       Past Medical History:   Diagnosis Date   • Arthritis    • Atrial fibrillation    • Diverticulosis    • GERD (gastroesophageal reflux disease)    • Hyperlipidemia    • Hypertension    • Plantar fasciitis    • Shingles    • Skin cancer    • Urticaria        Past Surgical History:   Procedure Laterality Date   • CARDIAC CATHETERIZATION N/A 4/15/2024    Procedure: Coronary angiography;  Surgeon: Jeremie Lala MD;  Location: [facility] CATH INVASIVE LOCATION;  Service: Cardiology;  Laterality: N/A;   • CARDIAC CATHETERIZATION  N/A 4/15/2024    Procedure: Percutaneous Coronary Intervention;  Surgeon: Jeremie Lala MD;  Location:  [facility] CATH INVASIVE LOCATION;  Service: Cardiology;  Laterality: N/A;   • COLONOSCOPY  06/02/2016    5yr colon recall based on prep adequate to identify polyps greater than 6 mm   • ENDOSCOPY  06/14/2011   • INGUINAL HERNIA REPAIR     • REPLACEMENT TOTAL KNEE     • SHOULDER SURGERY Bilateral        Family History   Problem Relation Age of Onset   • Crohn's disease Son    • Heart disease Father    • Cancer Mother    • Stroke Brother    • Colon cancer Neg Hx    • Colon polyps Neg Hx        Social History     Socioeconomic History   • Marital status:    Tobacco Use   • Smoking status: Former   • Smokeless tobacco: Former     Types: Chew   Vaping Use   • Vaping status: Never Used   Substance and Sexual Activity   • Alcohol use: Yes     Alcohol/week: 10.0 - 12.0 standard drinks of alcohol     Types: 10 - 12 Cans of beer per week   • Drug use: No   • Sexual activity: Defer       Allergies   Allergen Reactions   • Contrast Dye (Echo Or Unknown Ct/Mr) Other (See Comments)     flush         Current Outpatient Medications:   •  acetaminophen (TYLENOL) 500 MG tablet, Take 2 tablets by mouth Every 6 (Six) Hours As Needed for Mild Pain., Disp: , Rfl:   •  amiodarone (PACERONE) 200 MG tablet, Take 200 mg daily., Disp: 90 tablet, Rfl: 3  •  apixaban (ELIQUIS) 2.5 MG tablet tablet, Take 1 tablet by mouth Every 12 (Twelve) Hours. Indications: Atrial Fibrillation, Disp: 60 tablet, Rfl: 0  •  atorvastatin (LIPITOR) 40 MG tablet, Take 1 tablet by mouth Every Night., Disp: 90 tablet, Rfl: 3  •  baclofen (LIORESAL) 10 MG tablet, Take 1 tablet by mouth every night at bedtime., Disp: , Rfl:   •  Cholecalciferol (VITAMIN D3) 125 MCG (5000 UT) capsule capsule, Take 1 capsule by mouth Daily., Disp: , Rfl:   •  clopidogrel (PLAVIX) 75 MG tablet, Take 1 tablet by mouth Daily., Disp: 90 tablet, Rfl: 3  •  dilTIAZem CD (CARDIZEM CD)  360 MG 24 hr capsule, Take 1 capsule by mouth Daily., Disp: 30 capsule, Rfl: 11  •  famotidine (PEPCID) 40 MG tablet, Take 0.5 tablets by mouth 2 (Two) Times a Day., Disp: , Rfl:   •  gemfibrozil (LOPID) 600 MG tablet, Take 1 tablet by mouth Daily., Disp: , Rfl:   •  metoprolol tartrate (LOPRESSOR) 25 MG tablet, Take 1 tablet by mouth 2 (Two) Times a Day., Disp: 180 tablet, Rfl: 3  •  nitroglycerin (NITROSTAT) 0.4 MG SL tablet, Place 1 tablet under the tongue Every 5 (Five) Minutes As Needed for Chest Pain (Systolic BP Greater Than 100). Take no more than 3 doses in 15 minutes., Disp: 25 tablet, Rfl: 1  •  omeprazole (priLOSEC) 20 MG capsule, Take 1 capsule by mouth Daily., Disp: 90 capsule, Rfl: 0  •  predniSONE (DELTASONE) 50 MG tablet, Take 1 tablet by mouth Take As Directed for 3 doses. Take 1 tablet (50mg) 13 Hours 7 Hours & 1 Hour Prior to Exam, Disp: 3 tablet, Rfl: 0  •  ranolazine (RANEXA) 500 MG 12 hr tablet, Take 1 tablet by mouth Every 12 (Twelve) Hours., Disp: , Rfl:   •  tamsulosin (FLOMAX) 0.4 MG capsule 24 hr capsule, Take 1 capsule by mouth Daily., Disp: 90 capsule, Rfl: 3  •  traMADol (ULTRAM) 50 MG tablet, Take 1 tablet by mouth Every 8 (Eight) Hours As Needed for Moderate Pain., Disp: , Rfl:   •  diphenhydrAMINE (BENADRYL) 50 MG capsule, TAKE 1 CAPSULE BY MOUTH 1 HOUR BEFORE PROCEDURE, Disp: 1 capsule, Rfl: 0  •  predniSONE (DELTASONE) 50 MG tablet, Take 1 tablet by mouth As Needed (see sig). 1 tablet by mouth 24, 12, and 1 hour before procedure, Disp: 3 tablet, Rfl: 0    Review of Systems   Constitutional: Negative.    HENT: Negative.     Eyes: Negative.    Respiratory: Negative.     Cardiovascular: Negative.         Claudication right leg   Gastrointestinal: Negative.    Endocrine: Negative.    Genitourinary: Negative.    Musculoskeletal:  Positive for arthralgias, back pain and gait problem.   Skin: Negative.    Allergic/Immunologic: Negative.    Hematological: Negative.   "  Psychiatric/Behavioral: Negative.     All other systems reviewed and are negative.    /68   Pulse 68   Ht 182.9 cm (72\")   Wt 79.8 kg (176 lb)   SpO2 94%   BMI 23.87 kg/m²     Physical Exam  Vitals and nursing note reviewed.   Constitutional:       Appearance: He is well-developed.   HENT:      Head: Normocephalic and atraumatic.   Eyes:      General: No scleral icterus.     Pupils: Pupils are equal, round, and reactive to light.   Neck:      Thyroid: No thyromegaly.      Vascular: No carotid bruit or JVD.   Cardiovascular:      Rate and Rhythm: Normal rate and regular rhythm.      Pulses:           Carotid pulses are 2+ on the right side and 2+ on the left side.       Femoral pulses are 2+ on the right side and 2+ on the left side.       Popliteal pulses are 2+ on the left side.        Dorsalis pedis pulses are 2+ on the left side.        Posterior tibial pulses are 2+ on the left side.      Heart sounds: Normal heart sounds.   Pulmonary:      Effort: Pulmonary effort is normal.      Breath sounds: Normal breath sounds.   Abdominal:      General: Bowel sounds are normal. There is no distension or abdominal bruit.      Palpations: Abdomen is soft. There is no mass.      Tenderness: There is no abdominal tenderness.   Musculoskeletal:         General: Normal range of motion.      Cervical back: Neck supple.   Lymphadenopathy:      Cervical: No cervical adenopathy.   Skin:     General: Skin is warm and dry.   Neurological:      Mental Status: He is alert and oriented to person, place, and time.      Cranial Nerves: No cranial nerve deficit.      Sensory: No sensory deficit.         CT Angio Abdominal Aorta Bilateral Iliofem Runoff    Result Date: 6/7/2024  Narrative: EXAM: CT ANGIO ABDOMINAL AORTA BILAT ILIOFEM RUNOFF- - 6/6/2024 12:47 PM  HISTORY: claudication, abnormal david, R>L; I73.9-Peripheral vascular disease, unspecified; R68.89-Other general symptoms and signs; I70.213-Atherosclerosis of native " arteries of extremities with intermittent claudication, bilateral legs   COMPARISON: None.  DOSE LENGTH PRODUCT: 728 mGy cm. Automatic exposure control was utilized to make radiation dose as low as reasonably achievable.  TECHNIQUE: Enhanced  axial images of the abdomen, pelvis and bilateral lower extremities obtained with multiplanar reformats. 3D postprocessing, including MIPs, performed and images saved to PACS.  FINDINGS: VISUALIZED CHEST: Small dependent bibasilar groundglass opacities, favored to represent atelectasis. No pleural or pericardial effusion. Borderline inferior heart size with scattered coronary artery calcifications.  Arterial phase of imaging limits solid organ evaluation.  LIVER: No focally suspicious finding.  BILIARY: Layering density in the gallbladder most likely small stones or sludge. No bile duct dilation.  PANCREAS: Normal pancreas contour and enhancement.  SPLEEN: Normal size and contour.  ADRENAL: Normal appearance of the bilateral adrenal glands.  GENITOURINARY: No hydronephrosis, urolithiasis or solid renal lesion. Urinary bladder is within normal limits. Normal prostate size. Probable vasectomy clips.  PERITONEUM: No free air or ascites.  GI TRACT: Normal configuration of the stomach and duodenum. No abnormally dilated loops of bowel. Colonic diverticula. No evidence of acute diverticulitis. Appendix not discretely identified. No secondary signs of appendicitis.  Thickening versus underdistention of the sigmoid colon on axial series 4, images 165-188.  VESSELS: Aorta. Infrarenal abdominal aorta measures 2.7 x 2.6 cm (AP by transverse) with calcified and noncalcified atherosclerosis. Celiac, conventional hepatic, splenic arteries patent and normal caliber.  Superior mesenteric artery with calcified and noncalcified atherosclerosis at the origin with mild appearing stenosis for a length of 1.9 cm.  Dual RIGHT renal arteries patent with calcified atherosclerosis at the origin and  probable mild stenosis.  Single LEFT renal artery with calcified and noncalcified atherosclerosis and severe appearing stenosis at the proximal aspect for a length of 1.7 cm.  Inferior mesenteric artery appears patent.  RIGHT common iliac artery measures up to 1.6 cm for a length of 2.5 cm with calcified and noncalcified atherosclerosis. RIGHT internal iliac artery with short segment calcified and noncalcified atherosclerosis and dilation measuring 1.8 cm. RIGHT external iliac, common femoral, and profundofemoral arteries patent with multifocal mild stenosis.  RIGHT femoral artery with short segment dissection or web on axial series 4, image 327. Multifocal calcified and noncalcified stenosis with mild and moderate appearing stenosis for a length of 5 cm.  RIGHT proximal popliteal artery patent. At the level of the proximal tibia, the popliteal artery is nonopacified for a length of at least 3 cm (sagittal series 9, image 144).  RIGHT anterior tibial, posterior tibial arteries patent into the foot. RIGHT peroneal artery patent to the ankle.  LEFT common iliac, internal iliac, external iliac arteries patent. LEFT common femoral artery dilated measuring 1.9 cm with calcified and noncalcified atherosclerosis. LEFT deep femoral artery patent.  LEFT femoral artery patent with multifocal stenosis, which appears to be moderate to severe for a length of 7 cm on axial series 4, images 338-371 and sagittal series 9, image 74-76.  Visualized portion of the LEFT popliteal artery appears patent, obscured at the level of the knee prosthesis. LEFT peroneal artery patent into the foot. Patchy opacification of the LEFT anterior and posterior tibial arteries throughout their course with the posterior tibial artery reconstituting into the foot.  RETROPERITONEUM: No mass, lymphadenopathy or hemorrhage.  SOFT TISSUES: Moderate fatty atrophy of the bilateral calf musculature. Otherwise overlying soft tissues appear within normal limits.   BONES: No acute or aggressive bony lesion. Bone island in L2, which was also present on 8/3/2012 radiographs.       Impression: 1. RIGHT popliteal artery with at least 3 cm length occlusion, possibly longer, evaluation limited due to metallic artifact at the level of the knee prosthesis. There is two-vessel runoff into the foot. 2. LEFT femoral artery with multifocal moderate to severe stenosis for a length of 7 cm. There is 1-2 vessel runoff into the foot as described above. 3. LEFT renal artery with severe appearing stenosis for a length of 1.7 cm. Additional vascular findings as described in detail above. 4. Sigmoid colon under distention versus thickening. Consider follow-up with oral contrast or direct visualization. 4. Gallstones or sludge. 5. Groundglass opacities at the bilateral dependent lung bases, favored to represent atelectasis. If the patient has symptoms, infection would be a consideration.  This report was signed and finalized on 6/7/2024 9:09 AM by Dr Brianna Evans MD.        Patient Active Problem List   Diagnosis   • Benign prostatic hyperplasia with lower urinary tract symptoms   • Poor urinary stream   • Elevated prostate specific antigen (PSA)   • Gastroesophageal reflux disease   • Atrial fibrillation   • Hypertension   • Low back pain   • Encounter for screening for malignant neoplasm of colon   • Coagulopathy   • Chronic anticoagulation   • Hyperlipidemia   • PSVT (paroxysmal supraventricular tachycardia)   • CKD (chronic kidney disease) stage 3, GFR 30-59 ml/min   • Coronary artery disease of native artery of native heart with stable angina pectoris   • S/P drug eluting coronary stent placement   • Right popliteal artery occlusion        Diagnosis Plan   1. PAD (peripheral artery disease)  CBC and Differential    Basic metabolic panel    APTT    Protime-INR    XR chest 2 vw    Case Request    ceFAZolin (ANCEF) 2,000 mg in sodium chloride 0.9 % 100 mL IVPB    Case Request    sodium  chloride 0.9 % infusion      2. Allergy to intravenous contrast  predniSONE (DELTASONE) 50 MG tablet      3. Claudication        4. Preop testing  CBC and Differential    Basic metabolic panel    APTT    Protime-INR    XR chest 2 vw    Case Request    ceFAZolin (ANCEF) 2,000 mg in sodium chloride 0.9 % 100 mL IVPB    Case Request      5. Encounter for monitoring antiplatelet therapy  APTT      6. Chronic kidney disease, unspecified CKD stage  sodium chloride 0.9 % infusion          Plan: After thoroughly evaluating Fabian Velez Sr., I believe the best course of action is to proceed with a right lower extremity angiogram.  He is having calf claudication to the right lower extremity.  I did review testing which does show right popliteal artery occluded for about 3 cm Dr. Samuels does pick back up below the knee.  His ABIs showed moderate arterial insufficiency to the right and no arterial insufficiency on the left.  Risks of angiogram were discussed.  These include, but are not limited to, bleeding, infection, vessel damage, nerve damage, embolus, and loss of limb.  The patient understands these risks and wishes to proceed with procedure.  He does have chronic kidney disease and will need hydration prior to the procedure.  The patient is to continue taking their medications as previously discussed.   This was all discussed in full with complete understanding.  Thank you for allowing me to participate in the care of your patient.  Please do not hesitate to call with any questions or concerns.  We will keep you aware of any further encounters with Fabian Velez Sr..        Sincerely yours,         DO Saud Valdes Jeffrey L, MD

## 2024-06-28 ENCOUNTER — TELEPHONE (OUTPATIENT)
Dept: VASCULAR SURGERY | Facility: CLINIC | Age: 84
End: 2024-06-28
Payer: MEDICARE

## 2024-06-28 PROBLEM — Z01.818 PREOP TESTING: Status: ACTIVE | Noted: 2024-06-25

## 2024-06-28 PROBLEM — I73.9 PAD (PERIPHERAL ARTERY DISEASE): Status: ACTIVE | Noted: 2024-06-25

## 2024-06-28 NOTE — TELEPHONE ENCOUNTER
Pt expressed understanding of prework/surgery time and instruction for procedure scheduled 08/01/24 with Dr. Ba.  NPO after midnight.

## 2024-07-01 RX ORDER — DILTIAZEM HYDROCHLORIDE 360 MG/1
360 CAPSULE, EXTENDED RELEASE ORAL DAILY
Qty: 90 CAPSULE | Refills: 3 | Status: SHIPPED | OUTPATIENT
Start: 2024-07-01

## 2024-07-25 ENCOUNTER — PRE-ADMISSION TESTING (OUTPATIENT)
Dept: PREADMISSION TESTING | Facility: HOSPITAL | Age: 84
End: 2024-07-25
Payer: MEDICARE

## 2024-07-25 ENCOUNTER — HOSPITAL ENCOUNTER (OUTPATIENT)
Dept: GENERAL RADIOLOGY | Facility: HOSPITAL | Age: 84
Discharge: HOME | End: 2024-07-25
Payer: MEDICARE

## 2024-07-25 VITALS
BODY MASS INDEX: 23.62 KG/M2 | WEIGHT: 174.38 LBS | DIASTOLIC BLOOD PRESSURE: 68 MMHG | HEART RATE: 65 BPM | OXYGEN SATURATION: 97 % | HEIGHT: 72 IN | RESPIRATION RATE: 16 BRPM | SYSTOLIC BLOOD PRESSURE: 115 MMHG

## 2024-07-25 DIAGNOSIS — I73.9 PAD (PERIPHERAL ARTERY DISEASE): ICD-10-CM

## 2024-07-25 DIAGNOSIS — Z79.02 ENCOUNTER FOR MONITORING ANTIPLATELET THERAPY: ICD-10-CM

## 2024-07-25 DIAGNOSIS — Z01.818 PREOP TESTING: ICD-10-CM

## 2024-07-25 DIAGNOSIS — Z51.81 ENCOUNTER FOR MONITORING ANTIPLATELET THERAPY: ICD-10-CM

## 2024-07-25 LAB
ANION GAP SERPL CALCULATED.3IONS-SCNC: 10 MMOL/L (ref 5–15)
APTT PPP: 34.2 SECONDS (ref 24.5–36)
BASOPHILS # BLD AUTO: 0.04 10*3/MM3 (ref 0–0.2)
BASOPHILS NFR BLD AUTO: 0.6 % (ref 0–1.5)
BUN SERPL-MCNC: 21 MG/DL (ref 8–23)
BUN/CREAT SERPL: 12.4 (ref 7–25)
CALCIUM SPEC-SCNC: 9.4 MG/DL (ref 8.6–10.5)
CHLORIDE SERPL-SCNC: 96 MMOL/L (ref 98–107)
CO2 SERPL-SCNC: 27 MMOL/L (ref 22–29)
CREAT SERPL-MCNC: 1.69 MG/DL (ref 0.76–1.27)
DEPRECATED RDW RBC AUTO: 43.7 FL (ref 37–54)
EGFRCR SERPLBLD CKD-EPI 2021: 40 ML/MIN/1.73
EOSINOPHIL # BLD AUTO: 0.72 10*3/MM3 (ref 0–0.4)
EOSINOPHIL NFR BLD AUTO: 10.7 % (ref 0.3–6.2)
ERYTHROCYTE [DISTWIDTH] IN BLOOD BY AUTOMATED COUNT: 13.2 % (ref 12.3–15.4)
GLUCOSE SERPL-MCNC: 107 MG/DL (ref 65–99)
HCT VFR BLD AUTO: 39 % (ref 37.5–51)
HGB BLD-MCNC: 12.9 G/DL (ref 13–17.7)
IMM GRANULOCYTES # BLD AUTO: 0.02 10*3/MM3 (ref 0–0.05)
IMM GRANULOCYTES NFR BLD AUTO: 0.3 % (ref 0–0.5)
INR PPP: 1.06 (ref 0.91–1.09)
LYMPHOCYTES # BLD AUTO: 1.21 10*3/MM3 (ref 0.7–3.1)
LYMPHOCYTES NFR BLD AUTO: 18 % (ref 19.6–45.3)
MCH RBC QN AUTO: 30.2 PG (ref 26.6–33)
MCHC RBC AUTO-ENTMCNC: 33.1 G/DL (ref 31.5–35.7)
MCV RBC AUTO: 91.3 FL (ref 79–97)
MONOCYTES # BLD AUTO: 1.22 10*3/MM3 (ref 0.1–0.9)
MONOCYTES NFR BLD AUTO: 18.1 % (ref 5–12)
NEUTROPHILS NFR BLD AUTO: 3.53 10*3/MM3 (ref 1.7–7)
NEUTROPHILS NFR BLD AUTO: 52.3 % (ref 42.7–76)
NRBC BLD AUTO-RTO: 0 /100 WBC (ref 0–0.2)
PLATELET # BLD AUTO: 238 10*3/MM3 (ref 140–450)
PMV BLD AUTO: 9.1 FL (ref 6–12)
POTASSIUM SERPL-SCNC: 4.1 MMOL/L (ref 3.5–5.2)
PROTHROMBIN TIME: 14.2 SECONDS (ref 11.8–14.8)
RBC # BLD AUTO: 4.27 10*6/MM3 (ref 4.14–5.8)
SODIUM SERPL-SCNC: 133 MMOL/L (ref 136–145)
WBC NRBC COR # BLD AUTO: 6.74 10*3/MM3 (ref 3.4–10.8)

## 2024-07-25 PROCEDURE — 80048 BASIC METABOLIC PNL TOTAL CA: CPT

## 2024-07-25 PROCEDURE — 71046 X-RAY EXAM CHEST 2 VIEWS: CPT

## 2024-07-25 PROCEDURE — 85730 THROMBOPLASTIN TIME PARTIAL: CPT

## 2024-07-25 PROCEDURE — 85610 PROTHROMBIN TIME: CPT

## 2024-07-25 PROCEDURE — 85025 COMPLETE CBC W/AUTO DIFF WBC: CPT

## 2024-07-25 PROCEDURE — 36415 COLL VENOUS BLD VENIPUNCTURE: CPT

## 2024-07-25 NOTE — DISCHARGE INSTRUCTIONS
Preparing for Surgery  Follow these instructions before the procedure:  FOLLOW DR SUNSHINE'S INSTRUCTIONS RE: YOUR PLAVIX, ELIQUS   Several days or weeks before your procedure  Ask your health care provider about:  Changing or stopping your regular medicines. This is especially important if you are taking diabetes medicines or blood thinners.  Taking medicines such as aspirin and ibuprofen. These medicines can thin your blood. Do not take these medicines unless your health care provider tells you to take them.  Taking over-the-counter medicines, vitamins, herbs, and supplements.    Contact your surgeon if you:  Develop a fever of more than 100.4°F (38°C) or other feelings of illness during the 48 hours before your surgery.  Have symptoms that get worse.  Have questions or concerns about your surgery.  If you are going home the same day of your surgery you will need to arrange for a responsible adult, age 18 years old or older, to drive you home from the hospital and stay with you for 24 hours. Verification of the  will be made prior to any procedure requiring sedation. You may not go home in a taxi or any form of public transportation by yourself.     Day before your procedure    24 hours before your procedure DO NOT drink alcoholic beverages or smoke.  24 hours before your procedure STOP taking Erectile Dysfunction medication (i.e.,Cialis, Viagra)   You may be asked to shower with a germ-killing soap.  Day of your procedure   FOLLOW ALL PRE-MED INSTRUCTIONS PER YOUR PRESCRIPTION   You may take the following medication(s) the morning of surgery with a sip of water:  FAMOTIDINE, ULTRAM, METOPROLOL      8 hours before your procedure STOP all food, any dairy products, and full liquids. This includes hard candy, chewing gum or mints. This is extremely important to prevent serious complications.     Up to 2 hours before your scheduled arrival time, you may have clear liquids no cream, powder, or pulp of any kind.  Safe options are water, black coffee, plain tea, soda, Gatorade/Powerade, clear broth, apple juice.    2 hours before your scheduled arrival time, STOP drinking clear liquids.    You may need to take another shower with a germ-killing soap before you leave home in the morning. Do not use perfumes, colognes, or body lotions.  Wear comfortable loose-fitting clothing.  Remove all jewelry including body piercing and rings, dark colored nail polish, and make up prior to arrival at the hospital. Leave all valuables at home.   Bring your hearing aids if you rely on them.  Do not wear contact lenses. If you wear eyeglasses remember to bring a case to store them in while you are in surgery.  Do not use denture adhesives since you will be asked to remove them during your surgery.    You do not need to bring your home medications into the hospital.   Bring your sleep apnea device with you on the day of your surgery (if this applies to you).  If you wear portable oxygen, bring it with you.   If you are staying overnight, you may bring a bag of items you may need such as slippers, robe and a change of clothes for your discharge. You may want to leave these items in the car until you are ready for them since your family will take your belongings when you leave the pre-operative area.  Arrive at the hospital as scheduled by the office. You will be asked to arrive 2 hours prior to your surgery time in order to prepare for your procedure.  When you arrive at the hospital  Go to the registration desk located at the main entrance of the hospital.  After registration is completed, you will be given a beeper and a sticker sheet. Take the stickers to Outpatient Surgery and place in the tray at the end of the desk to notify the staff that you have arrived and registered.   Return to the lobby to wait. You are not always called back according to the time of arrival but rather the time your doctor will be ready.  When your beeper lights up  and vibrates proceed through the double doors, under the stairs, and a member of the Outpatient Surgery staff will escort you to your preoperative room.   How to Use Chlorhexidine Before Surgery  Chlorhexidine gluconate (CHG) is a germ-killing (antiseptic) solution that is used to clean the skin. It can get rid of the bacteria that normally live on the skin and can keep them away for about 24 hours. To clean your skin with CHG, you may be given:  A CHG solution to use in the shower or as part of a sponge bath.  A prepackaged cloth that contains CHG.  Cleaning your skin with CHG may help lower the risk for infection:  While you are staying in the intensive care unit of the hospital.  If you have a vascular access, such as a central line, to provide short-term or long-term access to your veins.  If you have a catheter to drain urine from your bladder.  If you are on a ventilator. A ventilator is a machine that helps you breathe by moving air in and out of your lungs.  After surgery.  What are the risks?  Risks of using CHG include:  A skin reaction.  Hearing loss, if CHG gets in your ears and you have a perforated eardrum.  Eye injury, if CHG gets in your eyes and is not rinsed out.  The CHG product catching fire.  Make sure that you avoid smoking and flames after applying CHG to your skin.  Do not use CHG:  If you have a chlorhexidine allergy or have previously reacted to chlorhexidine.  On babies younger than 2 months of age.  How to use CHG solution  Use CHG only as told by your health care provider, and follow the instructions on the label.  Use the full amount of CHG as directed. Usually, this is one bottle.  During a shower    Follow these steps when using CHG solution during a shower (unless your health care provider gives you different instructions):  Start the shower.  Use your normal soap and shampoo to wash your face and hair.  Turn off the shower or move out of the shower stream.  Pour the CHG onto a clean  washcloth. Do not use any type of brush or rough-edged sponge.  Starting at your neck, lather your body down to your toes. Make sure you follow these instructions:  If you will be having surgery, pay special attention to the part of your body where you will be having surgery. Scrub this area for at least 1 minute.  Do not use CHG on your head or face. If the solution gets into your ears or eyes, rinse them well with water.  Avoid your genital area.  Avoid any areas of skin that have broken skin, cuts, or scrapes.  Scrub your back and under your arms. Make sure to wash skin folds.  Let the lather sit on your skin for 1-2 minutes or as long as told by your health care provider.  Thoroughly rinse your entire body in the shower. Make sure that all body creases and crevices are rinsed well.  Dry off with a clean towel. Do not put any substances on your body afterward--such as powder, lotion, or perfume--unless you are told to do so by your health care provider. Only use lotions that are recommended by the .  Put on clean clothes or pajamas.  If it is the night before your surgery, sleep in clean sheets.     During a sponge bath  Follow these steps when using CHG solution during a sponge bath (unless your health care provider gives you different instructions):  Use your normal soap and shampoo to wash your face and hair.  Pour the CHG onto a clean washcloth.  Starting at your neck, lather your body down to your toes. Make sure you follow these instructions:  If you will be having surgery, pay special attention to the part of your body where you will be having surgery. Scrub this area for at least 1 minute.  Do not use CHG on your head or face. If the solution gets into your ears or eyes, rinse them well with water.  Avoid your genital area.  Avoid any areas of skin that have broken skin, cuts, or scrapes.  Scrub your back and under your arms. Make sure to wash skin folds.  Let the lather sit on your skin for  1-2 minutes or as long as told by your health care provider.  Using a different clean, wet washcloth, thoroughly rinse your entire body. Make sure that all body creases and crevices are rinsed well.  Dry off with a clean towel. Do not put any substances on your body afterward--such as powder, lotion, or perfume--unless you are told to do so by your health care provider. Only use lotions that are recommended by the .  Put on clean clothes or pajamas.  If it is the night before your surgery, sleep in clean sheets.  How to use CHG prepackaged cloths  Only use CHG cloths as told by your health care provider, and follow the instructions on the label.  Use the CHG cloth on clean, dry skin.  Do not use the CHG cloth on your head or face unless your health care provider tells you to.  When washing with the CHG cloth:  Avoid your genital area.  Avoid any areas of skin that have broken skin, cuts, or scrapes.  Before surgery    Follow these steps when using a CHG cloth to clean before surgery (unless your health care provider gives you different instructions):  Using the CHG cloth, vigorously scrub the part of your body where you will be having surgery. Scrub using a back-and-forth motion for 3 minutes. The area on your body should be completely wet with CHG when you are done scrubbing.  Do not rinse. Discard the cloth and let the area air-dry. Do not put any substances on the area afterward, such as powder, lotion, or perfume.  Put on clean clothes or pajamas.  If it is the night before your surgery, sleep in clean sheets.     For general bathing  Follow these steps when using CHG cloths for general bathing (unless your health care provider gives you different instructions).  Use a separate CHG cloth for each area of your body. Make sure you wash between any folds of skin and between your fingers and toes. Wash your body in the following order, switching to a new cloth after each step:  The front of your neck,  shoulders, and chest.  Both of your arms, under your arms, and your hands.  Your stomach and groin area, avoiding the genitals.  Your right leg and foot.  Your left leg and foot.  The back of your neck, your back, and your buttocks.  Do not rinse. Discard the cloth and let the area air-dry. Do not put any substances on your body afterward--such as powder, lotion, or perfume--unless you are told to do so by your health care provider. Only use lotions that are recommended by the .  Put on clean clothes or pajamas.  Contact a health care provider if:  Your skin gets irritated after scrubbing.  You have questions about using your solution or cloth.  You swallow any chlorhexidine. Call your local poison control center (1-118.203.5946 in the [place]  Get help right away if:  Your eyes itch badly, or they become very red or swollen.  Your skin itches badly and is red or swollen.  Your hearing changes.  You have trouble seeing.  You have swelling or tingling in your mouth or throat.  You have trouble breathing.  These symptoms may represent a serious problem that is an emergency. Do not wait to see if the symptoms will go away. Get medical help right away. Call your local emergency services (832 in the [place] Do not drive yourself to the hospital.  Summary  Chlorhexidine gluconate (CHG) is a germ-killing (antiseptic) solution that is used to clean the skin. Cleaning your skin with CHG may help to lower your risk for infection.  You may be given CHG to use for bathing. It may be in a bottle or in a prepackaged cloth to use on your skin. Carefully follow your health care provider's instructions and the instructions on the product label.  Do not use CHG if you have a chlorhexidine allergy.  Contact your health care provider if your skin gets irritated after scrubbing.  This information is not intended to replace advice given to you by your health care provider. Make sure you discuss any questions you have with your  health care provider.  Document Revised: 04/17/2023 Document Reviewed: 02/28/2022  [employer] Patient Education © 2023 [employer]

## 2024-07-26 NOTE — H&P
7/25/2024          Jeremie Lala MD  [address]  [address]  [place] [address]     Fabian Velez Sr.  1940          Chief Complaint   Patient presents with    Bilateral carotid artery stenosis    popliteal artery aneurysm       Foot and ankle and thigh painful with cramps to calf, Cardiac stent recently placed         Dear Jeremie Lala MD     HPI  I had the pleasure of seeing your patient Fabian Velez Sr. in the office today.  Thank you kindly for this consultation.  As you recall, Fabian Velez Sr. is a 83 y.o.  male who you are currently following for coronary artery disease s/p cardiac catheterization with stent placement in April.  He has been having complaints of calf claudication at short distance. He is maintained on Plavix and Eliquis 2.5 mg.  He did have noninvasive testing performed which I did personally review.      Past Medical History:   Diagnosis Date    Arthritis     Atrial fibrillation     Coronary artery disease     Diverticulosis     Elevated cholesterol     GERD (gastroesophageal reflux disease)     Hyperlipidemia     Hypertension     Plantar fasciitis     Shingles     Skin cancer     Urinary hesitancy     Urticaria      Past Surgical History:   Procedure Laterality Date    APPENDECTOMY      CARDIAC CATHETERIZATION N/A 04/15/2024    Procedure: Coronary angiography;  Surgeon: Jeremie Lala MD;  Location:  [facility];  Service: Cardiology;  Laterality: N/A;    CARDIAC CATHETERIZATION N/A 04/15/2024    Procedure: Percutaneous Coronary Intervention;  Surgeon: Jeremie Lala MD;  Location:  [facility];  Service: Cardiology;  Laterality: N/A;    COLONOSCOPY  06/02/2016    5yr colon recall based on prep adequate to identify polyps greater than 6 mm    ENDOSCOPY  06/14/2011    INGUINAL HERNIA REPAIR      REPLACEMENT TOTAL KNEE Bilateral     SHOULDER SURGERY Bilateral      Social History     Socioeconomic History    Marital status:     Tobacco Use    Smoking status: Former     Current packs/day: 0.50     Average packs/day: 0.5 packs/day for 3.6 years (1.8 ttl pk-yrs)     Types: Cigarettes     Start date: 2021     Quit date: 1961    Smokeless tobacco: Former     Types: Chew     Quit date: 2021   Vaping Use    Vaping status: Never Used   Substance and Sexual Activity    Alcohol use: Yes     Alcohol/week: 10.0 - 12.0 standard drinks of alcohol     Types: 10 - 12 Cans of beer per week    Drug use: No    Sexual activity: Defer     Family History   Problem Relation Age of Onset    Crohn's disease Son     Heart disease Father     Cancer Mother     Stroke Brother     Colon cancer Neg Hx     Colon polyps Neg Hx      Allergies   Allergen Reactions    Contrast Dye (Echo Or Unknown Ct/Mr) Other (See Comments)     flush     Current Outpatient Medications   Medication Instructions    acetaminophen (TYLENOL) 1,000 mg, Oral, Every 6 Hours PRN    amiodarone (PACERONE) 200 MG tablet Take 200 mg daily.    apixaban (ELIQUIS) 2.5 mg, Oral, Every 12 Hours Scheduled    atorvastatin (LIPITOR) 40 mg, Oral, Nightly    baclofen (LIORESAL) 10 MG tablet 1 tablet, Oral, Every Night at Bedtime    clopidogrel (PLAVIX) 75 mg, Oral, Daily    dilTIAZem CD (CARDIZEM CD) 360 mg, Oral, Daily    diphenhydrAMINE (BENADRYL) 50 MG capsule TAKE 1 CAPSULE BY MOUTH 1 HOUR BEFORE PROCEDURE    famotidine (PEPCID) 20 mg, Oral, 2 Times Daily    gemfibrozil (LOPID) 600 mg, Oral, Daily    metoprolol tartrate (LOPRESSOR) 25 mg, Oral, 2 Times Daily    nitroglycerin (NITROSTAT) 0.4 mg, Sublingual, Every 5 Minutes PRN, Take no more than 3 doses in 15 minutes.    omeprazole (PRILOSEC) 20 mg, Oral, Daily    predniSONE (DELTASONE) 50 mg, Oral, Take As Directed, Take 1 tablet (50mg) 13 Hours 7 Hours & 1 Hour Prior to Exam    predniSONE (DELTASONE) 50 mg, Oral, As Needed, 1 tablet by mouth 24, 12, and 1 hour before procedure    ranolazine (RANEXA) 500 MG 12 hr tablet 1 tablet, Oral, Every 12 Hours  "Scheduled    tamsulosin (FLOMAX) 0.4 mg, Oral, Daily    traMADol (ULTRAM) 50 MG tablet 1 tablet, Oral, Every 8 Hours PRN    vitamin D3 5,000 Units, Oral, Daily               Review of Systems   Constitutional: Negative.    HENT: Negative.     Eyes: Negative.    Respiratory: Negative.     Cardiovascular: Negative.         Claudication right leg   Gastrointestinal: Negative.    Endocrine: Negative.    Genitourinary: Negative.    Musculoskeletal:  Positive for arthralgias, back pain and gait problem.   Skin: Negative.    Allergic/Immunologic: Negative.    Hematological: Negative.    Psychiatric/Behavioral: Negative.     All other systems reviewed and are negative.     /68   Pulse 68   Ht 182.9 cm (72\")   Wt 79.8 kg (176 lb)   SpO2 94%   BMI 23.87 kg/m²      Physical Exam  Vitals and nursing note reviewed.   Constitutional:       Appearance: He is well-developed.   HENT:      Head: Normocephalic and atraumatic.   Eyes:      General: No scleral icterus.     Pupils: Pupils are equal, round, and reactive to light.   Neck:      Thyroid: No thyromegaly.      Vascular: No carotid bruit or JVD.   Cardiovascular:      Rate and Rhythm: Normal rate and regular rhythm.      Pulses:           Carotid pulses are 2+ on the right side and 2+ on the left side.       Femoral pulses are 2+ on the right side and 2+ on the left side.       Popliteal pulses are 2+ on the left side.        Dorsalis pedis pulses are 2+ on the left side.        Posterior tibial pulses are 2+ on the left side.      Heart sounds: Normal heart sounds.   Pulmonary:      Effort: Pulmonary effort is normal.      Breath sounds: Normal breath sounds.   Abdominal:      General: Bowel sounds are normal. There is no distension or abdominal bruit.      Palpations: Abdomen is soft. There is no mass.      Tenderness: There is no abdominal tenderness.   Musculoskeletal:         General: Normal range of motion.      Cervical back: Neck supple.   Lymphadenopathy:    "   Cervical: No cervical adenopathy.   Skin:     General: Skin is warm and dry.   Neurological:      Mental Status: He is alert and oriented to person, place, and time.      Cranial Nerves: No cranial nerve deficit.      Sensory: No sensory deficit.            CT Angio Abdominal Aorta Bilateral Iliofem Runoff     Result Date: 6/7/2024  Narrative: EXAM: CT ANGIO ABDOMINAL AORTA BILAT ILIOFEM RUNOFF- - 6/6/2024 12:47 PM  HISTORY: claudication, abnormal david, R>L; I73.9-Peripheral vascular disease, unspecified; R68.89-Other general symptoms and signs; I70.213-Atherosclerosis of native arteries of extremities with intermittent claudication, bilateral legs   COMPARISON: None.  DOSE LENGTH PRODUCT: 728 mGy cm. Automatic exposure control was utilized to make radiation dose as low as reasonably achievable.  TECHNIQUE: Enhanced  axial images of the abdomen, pelvis and bilateral lower extremities obtained with multiplanar reformats. 3D postprocessing, including MIPs, performed and images saved to PACS.  FINDINGS: VISUALIZED CHEST: Small dependent bibasilar groundglass opacities, favored to represent atelectasis. No pleural or pericardial effusion. Borderline inferior heart size with scattered coronary artery calcifications.  Arterial phase of imaging limits solid organ evaluation.  LIVER: No focally suspicious finding.  BILIARY: Layering density in the gallbladder most likely small stones or sludge. No bile duct dilation.  PANCREAS: Normal pancreas contour and enhancement.  SPLEEN: Normal size and contour.  ADRENAL: Normal appearance of the bilateral adrenal glands.  GENITOURINARY: No hydronephrosis, urolithiasis or solid renal lesion. Urinary bladder is within normal limits. Normal prostate size. Probable vasectomy clips.  PERITONEUM: No free air or ascites.  GI TRACT: Normal configuration of the stomach and duodenum. No abnormally dilated loops of bowel. Colonic diverticula. No evidence of acute diverticulitis. Appendix not  discretely identified. No secondary signs of appendicitis.  Thickening versus underdistention of the sigmoid colon on axial series 4, images 165-188.  VESSELS: Aorta. Infrarenal abdominal aorta measures 2.7 x 2.6 cm (AP by transverse) with calcified and noncalcified atherosclerosis. Celiac, conventional hepatic, splenic arteries patent and normal caliber.  Superior mesenteric artery with calcified and noncalcified atherosclerosis at the origin with mild appearing stenosis for a length of 1.9 cm.  Dual RIGHT renal arteries patent with calcified atherosclerosis at the origin and probable mild stenosis.  Single LEFT renal artery with calcified and noncalcified atherosclerosis and severe appearing stenosis at the proximal aspect for a length of 1.7 cm.  Inferior mesenteric artery appears patent.  RIGHT common iliac artery measures up to 1.6 cm for a length of 2.5 cm with calcified and noncalcified atherosclerosis. RIGHT internal iliac artery with short segment calcified and noncalcified atherosclerosis and dilation measuring 1.8 cm. RIGHT external iliac, common femoral, and profundofemoral arteries patent with multifocal mild stenosis.  RIGHT femoral artery with short segment dissection or web on axial series 4, image 327. Multifocal calcified and noncalcified stenosis with mild and moderate appearing stenosis for a length of 5 cm.  RIGHT proximal popliteal artery patent. At the level of the proximal tibia, the popliteal artery is nonopacified for a length of at least 3 cm (sagittal series 9, image 144).  RIGHT anterior tibial, posterior tibial arteries patent into the foot. RIGHT peroneal artery patent to the ankle.  LEFT common iliac, internal iliac, external iliac arteries patent. LEFT common femoral artery dilated measuring 1.9 cm with calcified and noncalcified atherosclerosis. LEFT deep femoral artery patent.  LEFT femoral artery patent with multifocal stenosis, which appears to be moderate to severe for a length  of 7 cm on axial series 4, images 338-371 and sagittal series 9, image 74-76.  Visualized portion of the LEFT popliteal artery appears patent, obscured at the level of the knee prosthesis. LEFT peroneal artery patent into the foot. Patchy opacification of the LEFT anterior and posterior tibial arteries throughout their course with the posterior tibial artery reconstituting into the foot.  RETROPERITONEUM: No mass, lymphadenopathy or hemorrhage.  SOFT TISSUES: Moderate fatty atrophy of the bilateral calf musculature. Otherwise overlying soft tissues appear within normal limits.  BONES: No acute or aggressive bony lesion. Bone island in L2, which was also present on 8/3/2012 radiographs.        Impression: 1. RIGHT popliteal artery with at least 3 cm length occlusion, possibly longer, evaluation limited due to metallic artifact at the level of the knee prosthesis. There is two-vessel runoff into the foot. 2. LEFT femoral artery with multifocal moderate to severe stenosis for a length of 7 cm. There is 1-2 vessel runoff into the foot as described above. 3. LEFT renal artery with severe appearing stenosis for a length of 1.7 cm. Additional vascular findings as described in detail above. 4. Sigmoid colon under distention versus thickening. Consider follow-up with oral contrast or direct visualization. 4. Gallstones or sludge. 5. Groundglass opacities at the bilateral dependent lung bases, favored to represent atelectasis. If the patient has symptoms, infection would be a consideration.  This report was signed and finalized on 6/7/2024 9:09 AM by Dr Brianna Evans MD.         Problem List       Patient Active Problem List   Diagnosis    Benign prostatic hyperplasia with lower urinary tract symptoms    Poor urinary stream    Elevated prostate specific antigen (PSA)    Gastroesophageal reflux disease    Atrial fibrillation    Hypertension    Low back pain    Encounter for screening for malignant neoplasm of colon     Coagulopathy    Chronic anticoagulation    Hyperlipidemia    PSVT (paroxysmal supraventricular tachycardia)    CKD (chronic kidney disease) stage 3, GFR 30-59 ml/min    Coronary artery disease of native artery of native heart with stable angina pectoris    S/P drug eluting coronary stent placement    Right popliteal artery occlusion              Diagnosis Plan   1. PAD (peripheral artery disease)  CBC and Differential     Basic metabolic panel     APTT     Protime-INR     XR chest 2 vw     Case Request     ceFAZolin (ANCEF) 2,000 mg in sodium chloride 0.9 % 100 mL IVPB     Case Request     sodium chloride 0.9 % infusion       2. Allergy to intravenous contrast  predniSONE (DELTASONE) 50 MG tablet       3. Claudication          4. Preop testing  CBC and Differential     Basic metabolic panel     APTT     Protime-INR     XR chest 2 vw     Case Request     ceFAZolin (ANCEF) 2,000 mg in sodium chloride 0.9 % 100 mL IVPB     Case Request       5. Encounter for monitoring antiplatelet therapy  APTT       6. Chronic kidney disease, unspecified CKD stage  sodium chloride 0.9 % infusion             Plan: After thoroughly evaluating Fabian Velez Sr., I believe the best course of action is to proceed with a right lower extremity angiogram.  He is having calf claudication to the right lower extremity.  I did review testing which does show right popliteal artery occluded for about 3 cm Dr. Samuels does pick back up below the knee.  His ABIs showed moderate arterial insufficiency to the right and no arterial insufficiency on the left.  Risks of angiogram were discussed.  These include, but are not limited to, bleeding, infection, vessel damage, nerve damage, embolus, and loss of limb.  The patient understands these risks and wishes to proceed with procedure.  He does have chronic kidney disease and will need hydration prior to the procedure.  The patient is to continue taking their medications as previously discussed.   This  was all discussed in full with complete understanding.  Thank you for allowing me to participate in the care of your patient.  Please do not hesitate to call with any questions or concerns.  We will keep you aware of any further encounters with Fabian Velez Sr..         Sincerely yours,           DO Saud Valdes Jeffrey L, MD

## 2024-07-29 ENCOUNTER — TELEPHONE (OUTPATIENT)
Dept: VASCULAR SURGERY | Facility: CLINIC | Age: 84
End: 2024-07-29
Payer: MEDICARE

## 2024-07-29 NOTE — TELEPHONE ENCOUNTER
Pt expressed understanding on holding of Eliquis for 2 days prior to procedure with Dr. Ba scheduled 08/01/24.

## 2024-07-31 ENCOUNTER — TELEPHONE (OUTPATIENT)
Dept: VASCULAR SURGERY | Facility: CLINIC | Age: 84
End: 2024-07-31
Payer: MEDICARE

## 2024-07-31 NOTE — TELEPHONE ENCOUNTER
Pt expressed understanding of arrival time of 9 am for procedure scheduled with Dr. Ba on 08/01/24.  Pt advised NPO after midnight.  Pt verified has held Eliquis for 2 days prior.  Pt will not take in morning prior to procedure.

## 2024-08-01 ENCOUNTER — ANESTHESIA (OUTPATIENT)
Dept: PERIOP | Facility: HOSPITAL | Age: 84
End: 2024-08-01
Payer: MEDICARE

## 2024-08-01 ENCOUNTER — ANESTHESIA EVENT (OUTPATIENT)
Dept: PERIOP | Facility: HOSPITAL | Age: 84
End: 2024-08-01
Payer: MEDICARE

## 2024-08-01 ENCOUNTER — HOSPITAL ENCOUNTER (OUTPATIENT)
Facility: HOSPITAL | Age: 84
Setting detail: HOSPITAL OUTPATIENT SURGERY
Discharge: HOME | End: 2024-08-01
Attending: SURGERY | Admitting: SURGERY
Payer: MEDICARE

## 2024-08-01 ENCOUNTER — APPOINTMENT (OUTPATIENT)
Dept: INTERVENTIONAL RADIOLOGY/VASCULAR | Facility: HOSPITAL | Age: 84
End: 2024-08-01
Payer: MEDICARE

## 2024-08-01 VITALS
SYSTOLIC BLOOD PRESSURE: 124 MMHG | RESPIRATION RATE: 16 BRPM | TEMPERATURE: 97.2 F | HEART RATE: 64 BPM | DIASTOLIC BLOOD PRESSURE: 77 MMHG | OXYGEN SATURATION: 94 %

## 2024-08-01 DIAGNOSIS — N18.9 CHRONIC KIDNEY DISEASE, UNSPECIFIED CKD STAGE: ICD-10-CM

## 2024-08-01 DIAGNOSIS — Z01.818 PREOP TESTING: ICD-10-CM

## 2024-08-01 DIAGNOSIS — I73.9 PAD (PERIPHERAL ARTERY DISEASE): ICD-10-CM

## 2024-08-01 LAB
ABO GROUP BLD: NORMAL
BLD GP AB SCN SERPL QL: NEGATIVE
RH BLD: POSITIVE
T&S EXPIRATION DATE: NORMAL

## 2024-08-01 PROCEDURE — C1769 GUIDE WIRE: HCPCS | Performed by: SURGERY

## 2024-08-01 PROCEDURE — 86900 BLOOD TYPING SEROLOGIC ABO: CPT | Performed by: SURGERY

## 2024-08-01 PROCEDURE — 76000 FLUOROSCOPY <1 HR PHYS/QHP: CPT

## 2024-08-01 PROCEDURE — 25810000003 LACTATED RINGERS PER 1000 ML: Performed by: SURGERY

## 2024-08-01 PROCEDURE — C1894 INTRO/SHEATH, NON-LASER: HCPCS | Performed by: SURGERY

## 2024-08-01 PROCEDURE — 37226 PR REVSC OPN/PRQ FEM/POP W/STNT/ANGIOP SM VSL: CPT | Performed by: SURGERY

## 2024-08-01 PROCEDURE — 86901 BLOOD TYPING SEROLOGIC RH(D): CPT | Performed by: SURGERY

## 2024-08-01 PROCEDURE — 25010000002 PROPOFOL 1000 MG/100ML EMULSION

## 2024-08-01 PROCEDURE — 25010000002 FENTANYL CITRATE (PF) 50 MCG/ML SOLUTION

## 2024-08-01 PROCEDURE — 25010000002 DIPHENHYDRAMINE PER 50 MG

## 2024-08-01 PROCEDURE — 25810000003 SODIUM CHLORIDE 0.9 % SOLUTION: Performed by: SURGERY

## 2024-08-01 PROCEDURE — 25010000002 HEPARIN (PORCINE) PER 1000 UNITS

## 2024-08-01 PROCEDURE — C1725 CATH, TRANSLUMIN NON-LASER: HCPCS | Performed by: SURGERY

## 2024-08-01 PROCEDURE — C1884 EMBOLIZATION PROTECT SYST: HCPCS | Performed by: SURGERY

## 2024-08-01 PROCEDURE — 25010000002 HEPARIN (PORCINE) PER 1000 UNITS: Performed by: SURGERY

## 2024-08-01 PROCEDURE — 25010000002 CEFAZOLIN PER 500 MG: Performed by: SURGERY

## 2024-08-01 PROCEDURE — 25010000002 BUPIVACAINE (PF) 0.5 % SOLUTION: Performed by: SURGERY

## 2024-08-01 PROCEDURE — C1874 STENT, COATED/COV W/DEL SYS: HCPCS | Performed by: SURGERY

## 2024-08-01 PROCEDURE — C1887 CATHETER, GUIDING: HCPCS | Performed by: SURGERY

## 2024-08-01 PROCEDURE — 76937 US GUIDE VASCULAR ACCESS: CPT | Performed by: SURGERY

## 2024-08-01 PROCEDURE — 75630 X-RAY AORTA LEG ARTERIES: CPT | Performed by: SURGERY

## 2024-08-01 PROCEDURE — 86850 RBC ANTIBODY SCREEN: CPT | Performed by: SURGERY

## 2024-08-01 PROCEDURE — 25510000001 IOPAMIDOL 61 % SOLUTION: Performed by: SURGERY

## 2024-08-01 PROCEDURE — 75710 ARTERY X-RAYS ARM/LEG: CPT

## 2024-08-01 PROCEDURE — C1760 CLOSURE DEV, VASC: HCPCS | Performed by: SURGERY

## 2024-08-01 PROCEDURE — 25010000002 HYDROCORTISONE SOD SUC (PF) 250 MG RECONSTITUTED SOLUTION

## 2024-08-01 DEVICE — STENTGR ENDOPROSTH VIABAHN RO HEP 7F 7MM 5X120CM: Type: IMPLANTABLE DEVICE | Site: GROIN | Status: FUNCTIONAL

## 2024-08-01 RX ORDER — HYDROCODONE BITARTRATE AND ACETAMINOPHEN 10; 325 MG/1; MG/1
1 TABLET ORAL EVERY 4 HOURS PRN
Status: DISCONTINUED | OUTPATIENT
Start: 2024-08-01 | End: 2024-08-01 | Stop reason: HOSPADM

## 2024-08-01 RX ORDER — DEXMEDETOMIDINE HYDROCHLORIDE 4 UG/ML
INJECTION, SOLUTION INTRAVENOUS AS NEEDED
Status: DISCONTINUED | OUTPATIENT
Start: 2024-08-01 | End: 2024-08-01 | Stop reason: SURG

## 2024-08-01 RX ORDER — PROPOFOL 10 MG/ML
INJECTION, EMULSION INTRAVENOUS CONTINUOUS PRN
Status: DISCONTINUED | OUTPATIENT
Start: 2024-08-01 | End: 2024-08-01 | Stop reason: SURG

## 2024-08-01 RX ORDER — CLOPIDOGREL BISULFATE 75 MG/1
75 TABLET ORAL DAILY
Qty: 90 TABLET | Refills: 3 | Status: SHIPPED | OUTPATIENT
Start: 2024-08-01

## 2024-08-01 RX ORDER — HYDROCODONE BITARTRATE AND ACETAMINOPHEN 5; 325 MG/1; MG/1
1 TABLET ORAL EVERY 4 HOURS PRN
Status: DISCONTINUED | OUTPATIENT
Start: 2024-08-01 | End: 2024-08-01 | Stop reason: HOSPADM

## 2024-08-01 RX ORDER — HEPARIN SODIUM 1000 [USP'U]/ML
INJECTION, SOLUTION INTRAVENOUS; SUBCUTANEOUS AS NEEDED
Status: DISCONTINUED | OUTPATIENT
Start: 2024-08-01 | End: 2024-08-01 | Stop reason: SURG

## 2024-08-01 RX ORDER — TRAMADOL HYDROCHLORIDE 50 MG/1
50 TABLET ORAL ONCE AS NEEDED
Status: DISCONTINUED | OUTPATIENT
Start: 2024-08-01 | End: 2024-08-01 | Stop reason: HOSPADM

## 2024-08-01 RX ORDER — HYDROCODONE BITARTRATE AND ACETAMINOPHEN 5; 325 MG/1; MG/1
1 TABLET ORAL ONCE AS NEEDED
Status: DISCONTINUED | OUTPATIENT
Start: 2024-08-01 | End: 2024-08-01 | Stop reason: HOSPADM

## 2024-08-01 RX ORDER — FLUMAZENIL 0.1 MG/ML
0.2 INJECTION INTRAVENOUS AS NEEDED
Status: DISCONTINUED | OUTPATIENT
Start: 2024-08-01 | End: 2024-08-01 | Stop reason: HOSPADM

## 2024-08-01 RX ORDER — SODIUM CHLORIDE 9 MG/ML
100 INJECTION, SOLUTION INTRAVENOUS CONTINUOUS
Status: DISCONTINUED | OUTPATIENT
Start: 2024-08-01 | End: 2024-08-01 | Stop reason: HOSPADM

## 2024-08-01 RX ORDER — CLOPIDOGREL BISULFATE 75 MG/1
150 TABLET ORAL ONCE
Status: COMPLETED | OUTPATIENT
Start: 2024-08-01 | End: 2024-08-01

## 2024-08-01 RX ORDER — DIPHENHYDRAMINE HYDROCHLORIDE 50 MG/ML
INJECTION INTRAMUSCULAR; INTRAVENOUS AS NEEDED
Status: DISCONTINUED | OUTPATIENT
Start: 2024-08-01 | End: 2024-08-01 | Stop reason: SURG

## 2024-08-01 RX ORDER — ONDANSETRON 2 MG/ML
4 INJECTION INTRAMUSCULAR; INTRAVENOUS ONCE AS NEEDED
Status: DISCONTINUED | OUTPATIENT
Start: 2024-08-01 | End: 2024-08-01 | Stop reason: HOSPADM

## 2024-08-01 RX ORDER — SODIUM CHLORIDE, SODIUM LACTATE, POTASSIUM CHLORIDE, CALCIUM CHLORIDE 600; 310; 30; 20 MG/100ML; MG/100ML; MG/100ML; MG/100ML
1000 INJECTION, SOLUTION INTRAVENOUS CONTINUOUS
Status: DISCONTINUED | OUTPATIENT
Start: 2024-08-01 | End: 2024-08-01 | Stop reason: HOSPADM

## 2024-08-01 RX ORDER — NALOXONE HCL 0.4 MG/ML
0.4 VIAL (ML) INJECTION AS NEEDED
Status: DISCONTINUED | OUTPATIENT
Start: 2024-08-01 | End: 2024-08-01 | Stop reason: HOSPADM

## 2024-08-01 RX ORDER — SODIUM CHLORIDE 0.9 % (FLUSH) 0.9 %
3 SYRINGE (ML) INJECTION AS NEEDED
Status: DISCONTINUED | OUTPATIENT
Start: 2024-08-01 | End: 2024-08-01 | Stop reason: HOSPADM

## 2024-08-01 RX ORDER — DROPERIDOL 2.5 MG/ML
0.62 INJECTION, SOLUTION INTRAMUSCULAR; INTRAVENOUS ONCE AS NEEDED
Status: DISCONTINUED | OUTPATIENT
Start: 2024-08-01 | End: 2024-08-01 | Stop reason: HOSPADM

## 2024-08-01 RX ORDER — FENTANYL CITRATE 50 UG/ML
INJECTION, SOLUTION INTRAMUSCULAR; INTRAVENOUS AS NEEDED
Status: DISCONTINUED | OUTPATIENT
Start: 2024-08-01 | End: 2024-08-01 | Stop reason: SURG

## 2024-08-01 RX ORDER — CLOPIDOGREL BISULFATE 75 MG/1
75 TABLET ORAL ONCE
Status: COMPLETED | OUTPATIENT
Start: 2024-08-01 | End: 2024-08-01

## 2024-08-01 RX ORDER — LABETALOL HYDROCHLORIDE 5 MG/ML
5 INJECTION, SOLUTION INTRAVENOUS
Status: DISCONTINUED | OUTPATIENT
Start: 2024-08-01 | End: 2024-08-01 | Stop reason: HOSPADM

## 2024-08-01 RX ORDER — BUPIVACAINE HYDROCHLORIDE 5 MG/ML
INJECTION, SOLUTION EPIDURAL; INTRACAUDAL AS NEEDED
Status: DISCONTINUED | OUTPATIENT
Start: 2024-08-01 | End: 2024-08-01 | Stop reason: HOSPADM

## 2024-08-01 RX ORDER — FENTANYL CITRATE 50 UG/ML
50 INJECTION, SOLUTION INTRAMUSCULAR; INTRAVENOUS
Status: DISCONTINUED | OUTPATIENT
Start: 2024-08-01 | End: 2024-08-01 | Stop reason: HOSPADM

## 2024-08-01 RX ADMIN — HEPARIN SODIUM 4000 UNITS: 1000 INJECTION, SOLUTION INTRAVENOUS; SUBCUTANEOUS at 13:52

## 2024-08-01 RX ADMIN — CLOPIDOGREL BISULFATE 150 MG: 75 TABLET ORAL at 15:15

## 2024-08-01 RX ADMIN — CLOPIDOGREL BISULFATE 75 MG: 75 TABLET ORAL at 13:25

## 2024-08-01 RX ADMIN — DIPHENHYDRAMINE HYDROCHLORIDE 25 MG: 50 INJECTION, SOLUTION INTRAMUSCULAR; INTRAVENOUS at 13:34

## 2024-08-01 RX ADMIN — HYDROCORTISONE SODIUM SUCCINATE 100 MG: 250 INJECTION, POWDER, FOR SOLUTION INTRAMUSCULAR; INTRAVENOUS at 13:34

## 2024-08-01 RX ADMIN — CEFAZOLIN 2000 MG: 2 INJECTION, POWDER, FOR SOLUTION INTRAMUSCULAR; INTRAVENOUS at 13:38

## 2024-08-01 RX ADMIN — SODIUM CHLORIDE 100 ML/HR: 9 INJECTION, SOLUTION INTRAVENOUS at 10:10

## 2024-08-01 RX ADMIN — SODIUM CHLORIDE, POTASSIUM CHLORIDE, SODIUM LACTATE AND CALCIUM CHLORIDE 1000 ML: 600; 310; 30; 20 INJECTION, SOLUTION INTRAVENOUS at 10:11

## 2024-08-01 RX ADMIN — FENTANYL CITRATE 50 MCG: 50 INJECTION, SOLUTION INTRAMUSCULAR; INTRAVENOUS at 14:00

## 2024-08-01 RX ADMIN — DEXMEDETOMIDINE HYDROCHLORIDE 10 MCG: 4 INJECTION, SOLUTION INTRAVENOUS at 13:54

## 2024-08-01 RX ADMIN — PROPOFOL 100 MCG/KG/MIN: 10 INJECTION, EMULSION INTRAVENOUS at 13:33

## 2024-08-01 RX ADMIN — HYDROCODONE BITARTRATE AND ACETAMINOPHEN 1 TABLET: 5; 325 TABLET ORAL at 15:53

## 2024-08-01 RX ADMIN — PROPOFOL 50 MG: 10 INJECTION, EMULSION INTRAVENOUS at 13:50

## 2024-08-01 RX ADMIN — HEPARIN SODIUM 1000 UNITS: 1000 INJECTION, SOLUTION INTRAVENOUS; SUBCUTANEOUS at 13:47

## 2024-08-01 RX ADMIN — FENTANYL CITRATE 50 MCG: 50 INJECTION, SOLUTION INTRAMUSCULAR; INTRAVENOUS at 14:05

## 2024-08-01 NOTE — ANESTHESIA PREPROCEDURE EVALUATION
" Anesthesia Evaluation                  Airway   Dental      Pulmonary    Cardiovascular     PT is on anticoagulation therapy  Patient on routine beta blocker    (+) hypertension, CAD, cardiac stents , dysrhythmias, PVD      Neuro/Psych  GI/Hepatic/Renal/Endo    (+) GERD, renal disease- CRI    Musculoskeletal     Abdominal    Substance History      OB/GYN          Other                          Anesthesia Plan    ASA 4     MAC     (April 2024 stent--    Plavix held-not OK; \" After a total of 1 year of antiplatelet therapy, he can discontinue it altogether as long as he remains on Eliquis for an anticoagulant.\"    Will give plavix in holding; Pt thinks he's only been on eliquis, so importance of plavix adherence emphasized. )  intravenous induction           CODE STATUS:         "

## 2024-08-01 NOTE — ANESTHESIA POSTPROCEDURE EVALUATION
Patient: Fabian Velez Sr.    Procedure Summary       Date: 08/01/24 Room / Location:  [facility] OR  /  [facility]    Anesthesia Start: 1328 Anesthesia Stop: 1506    Procedure: Right lower extremity angiogram (Right: Groin) Diagnosis:       PAD (peripheral artery disease)      Preop testing      (PAD (peripheral artery disease) [I73.9])      (Preop testing [Z01.818])    Surgeons: Isaac Ba DO Provider: Trip Witt CRNA    Anesthesia Type: MAC ASA Status: 4            Anesthesia Type: MAC    Vitals  Vitals Value Taken Time   /88 08/01/24 1529   Temp 97.2 °F (36.2 °C) 08/01/24 1507   Pulse 62 08/01/24 1529   Resp 16 08/01/24 1529   SpO2 93 % 08/01/24 1529           Post Anesthesia Care and Evaluation    Patient location during evaluation: PHASE II  Patient participation: complete - patient participated  Level of consciousness: awake and awake and alert  Pain score: 0  Pain management: adequate    Airway patency: patent  Anesthetic complications: No anesthetic complications  PONV Status: none  Cardiovascular status: acceptable  Respiratory status: acceptable  Hydration status: acceptable    Comments: Patient discharged according to acceptable Marii score per RN assessment. See nursing records for further information.     Blood pressure 159/88, pulse 62, temperature 97.2 °F (36.2 °C), temperature source Temporal, resp. rate 16, SpO2 93%.

## 2024-08-01 NOTE — OP NOTE
Fabian Velez Sr.  8/1/2024     PREOPERATIVE DIAGNOSIS: PAD (peripheral artery disease) [I73.9]  Preop testing [Z01.818]     POSTOPERATIVE DIAGNOSIS: Post-Op Diagnosis Codes:     * PAD (peripheral artery disease) [I73.9]     * Preop testing [Z01.818]     PROCEDURE PERFORMED:   1.  Introduction of catheter/sheath into the aorta  2.  Aortoiliac angiogram with right lower extremity runoff  3.  Contralateral cannulation of the right common iliac artery  4.  Attempted antegrade crossing of a popliteal artery chronic total occlusion  5.  Ultrasound-guided cannulation of the right dorsalis pedis artery at the ankle  6.  Retrograde crossing of a popliteal artery chronic total occlusion with rendezvous/flossing technique out through the left femoral sheath  7.  Placement of a 5 mm spider distal embolic protection device in the below-knee popliteal artery  8.  Balloon angioplasty of the popliteal artery  with a 3 x 60 mm Taylor cross balloon and a 6 x 60 mm EverCross balloon  9.  Stenting of the popliteal artery  with a 7 x 5 cm [place] Viabahn stent graft  10.  Retrieval of the spider distal embolic protection device  11.  Completion right lower extremity angiogram with radiographic supervision and interpretation  12.  Mynx closure of the left common femoral artery       SURGEON: Isaac Ba DO      ANESTHESIA: MAC    PREPARATION: Routine.    STAFF: Circulator: Miguelina Hein RN; Nya Tovar RN  Scrub Person: Ashlyn Martell; Mani Dinero  Vascular Radiology Technician: Pennie Mcgee    Estimated Blood Loss: minimal    SPECIMENS: None    COMPLICATIONS: None    INDICATIONS: Fabian Velez Sr. is a 83 y.o. male who you are currently following for coronary artery disease s/p cardiac catheterization with stent placement in April. He has been having complaints of calf claudication at short distance. He is maintained on Plavix and Eliquis 2.5 mg. He did have noninvasive testing performed which I did  personally review. The indications, risks, and possible complications of the procedure were explained to the patient, who voiced understanding and wished to proceed with surgery.     PROCEDURE IN DETAIL: The patient was taken to the operating room and placed on the operating table in a supine position. After MAC anesthesia was obtained, the bilateral groins was prepped and draped in a sterile manner.  5 cc of 0.5% Marcaine plain was used infiltrate the left groin focal anesthesia.  Using a micropuncture technique the left common femoral artery was cannulated and a microsheath was placed.  Advantage Glidewire was advanced to the aorta and a short 6 Armenian sheath was placed.  The patient was given 1000 units of intravenous heparin.  The Omni Flush catheter was advanced to the aorta and an aortoiliac angiogram was performed.  Findings are as follows:  1.  Patent renal arteries bilaterally without stenosis  2.  Patent aorta without stenosis  3.  Patent iliac systems bilaterally without stenosis    Contralateral cannulation was established of the right common iliac artery.  The catheter was then brought down to the level of the femoral head.  Angiogram with runoff was performed.  Findings are as follows:  1.  Patent common femoral, profunda femoris, and SFA without stenosis  2.  Occluded popliteal artery spanning approximately 5 cm directly behind the prosthetic knee joint  3.  Patent three-vessel runoff to the foot without stenosis    At this point, the decision was made to try and cross the popliteal artery .  A 7 Armenian by 65 cm destination sheath was placed down into the distal SFA.  The patient was given 4000 units of intravenous heparin.  With the help of the Zabala cross catheter, multiple attempts were made to cross the popliteal artery .  This was unsuccessful.  Next, the right foot was then properly prepped and draped in the standard sterile fashion.  Under ultrasound guidance, and using a micropuncture  technique, the dorsalis pedis artery was successfully cannulated and a microsheath was placed.  An angiogram was performed to ensure that we were in true lumen.  Next the 5 E[place]an tibial sheath was placed.  With the Zabala cross catheter, the distal cap was engaged and the  was easily crossed from a retrograde approach.  The wire was then flossed into the 7 Eritrean sheath and brought out through the left groin.  Now antegrade access was achieved.  A 5 mm spider distal embolic protection device was placed in the below-knee popliteal artery.  The popliteal lesion was then balloon angioplastied with a 3 x 60 mm Taylor cross balloon and a 6 x 60 mm EverCross balloon.  Next, the popliteal artery  was stented using a 7 x 5 cm Wade Viabahn covered stent graft.  It was postdilated with the same 6 x 60 mm EverCross balloon.  The spider was successfully retrieved.  Completion angiogram was performed which showed this area to be widely patent without any residual stenosis, dissection, or occlusion.  There was still maintained three-vessel runoff to the foot.  At this point, I felt the result was excellent and no further intervention was warranted.  The sheath at the ankle was removed and direct pressure was held for an additional 10 minutes to help ensure hemostasis.  The sheath and wire from above were also removed and a minx device was used to seal off the left common femoral artery.  Direct pressure was held for an additional 10 to 15 minutes to help ensure hemostasis.  Sterile dressings were applied. The patient tolerated the procedure well. Sponge and needle counts were correct. The patient was then awakened in the operating room and taken to the recovery room in good condition.    Isaac Ba,   Date: 8/1/2024 Time: 14:51 CDT    CC:MD Saud Miller Jeffrey L, MD

## 2024-08-14 ENCOUNTER — TELEPHONE (OUTPATIENT)
Dept: VASCULAR SURGERY | Facility: CLINIC | Age: 84
End: 2024-08-14
Payer: MEDICARE

## 2024-08-15 ENCOUNTER — OFFICE VISIT (OUTPATIENT)
Dept: VASCULAR SURGERY | Facility: CLINIC | Age: 84
End: 2024-08-15
Payer: MEDICARE

## 2024-08-15 VITALS
SYSTOLIC BLOOD PRESSURE: 128 MMHG | DIASTOLIC BLOOD PRESSURE: 62 MMHG | WEIGHT: 172 LBS | BODY MASS INDEX: 23.3 KG/M2 | HEIGHT: 72 IN | OXYGEN SATURATION: 98 % | HEART RATE: 57 BPM

## 2024-08-15 DIAGNOSIS — I65.23 BILATERAL CAROTID ARTERY STENOSIS: ICD-10-CM

## 2024-08-15 DIAGNOSIS — I73.9 PAD (PERIPHERAL ARTERY DISEASE): Primary | ICD-10-CM

## 2024-08-15 NOTE — PROGRESS NOTES
"8/15/2024        Jeremie Lala MD  [address]  [place] [address]      Fabian Velez Sr.  1940    Chief Complaint   Patient presents with    Post-op     2 week post op. Right angio done 8/1/24. Patient complains of right foot swelling since procedure.        Dear Dr. Jeremie Lala:    HPI  I had the pleasure of seeing your patient Fabian Velez Sr. in the office today.   As you recall, Fabian Velez Sr. is a 83 y.o.  male who you are currently following for coronary artery disease s/p cardiac catheterization with stent placement in April.  He is here for 2-week follow-up after undergoing right lower extremity angiogram on 8/1/2024.  He is doing well his only complaint is that he has right foot swelling, and it feels like he is walking on rocks.  He is maintained on Plavix, and Eliquis.        Review of Systems   Constitutional: Negative.  Negative for diaphoresis and fever.   HENT: Negative.     Eyes: Negative.    Respiratory: Negative.  Negative for shortness of breath and wheezing.    Cardiovascular:  Positive for leg swelling (Right foot swelling). Negative for chest pain.   Gastrointestinal: Negative.  Negative for abdominal pain.   Endocrine: Negative.    Genitourinary: Negative.    Musculoskeletal:  Positive for arthralgias, back pain and gait problem.   Skin: Negative.    Allergic/Immunologic: Negative.    Neurological:  Negative for dizziness and weakness.   Hematological: Negative.    Psychiatric/Behavioral: Negative.       /62   Pulse 57   Ht 182.9 cm (72\")   Wt 78 kg (172 lb)   SpO2 98%   BMI 23.33 kg/m²     Physical Exam  Vitals and nursing note reviewed.   Constitutional:       General: He is not in acute distress.     Appearance: Normal appearance. He is not diaphoretic.   HENT:      Head: Normocephalic. No right periorbital erythema or left periorbital erythema.      Nose: Nose normal.   Eyes:      General: No scleral icterus.     Pupils: Pupils are equal. "   Cardiovascular:      Rate and Rhythm: Normal rate and regular rhythm.      Pulses: Normal pulses.           Popliteal pulses are 2+ on the right side.        Dorsalis pedis pulses are detected w/ Doppler on the right side.        Posterior tibial pulses are detected w/ Doppler on the right side.      Heart sounds: Normal heart sounds. No murmur heard.     Comments: Left groin and right foot incision healed.  Pulmonary:      Effort: Pulmonary effort is normal. No respiratory distress.      Breath sounds: Normal breath sounds.   Abdominal:      General: Bowel sounds are normal. There is no distension.      Palpations: Abdomen is soft.      Tenderness: There is no abdominal tenderness. There is no guarding.   Musculoskeletal:         General: Swelling (Right foot) present. No tenderness. Normal range of motion.      Cervical back: Normal range of motion and neck supple.      Right lower leg: No edema.      Left lower leg: No edema.        Feet:    Feet:      Right foot:      Skin integrity: Skin integrity normal. Skin breakdown present.      Left foot:      Skin integrity: Skin integrity normal.      Comments: Stage I pressure ulcer-Mepilex applied  Skin:     General: Skin is warm and dry.      Findings: No erythema or rash.   Neurological:      General: No focal deficit present.      Mental Status: He is alert and oriented to person, place, and time. Mental status is at baseline.      Cranial Nerves: No cranial nerve deficit.      Gait: Gait normal.   Psychiatric:         Attention and Perception: Attention normal.         Mood and Affect: Mood normal.         Behavior: Behavior normal.         Thought Content: Thought content normal.         Judgment: Judgment normal.         Patient Active Problem List   Diagnosis    Benign prostatic hyperplasia with lower urinary tract symptoms    Poor urinary stream    Elevated prostate specific antigen (PSA)    Gastroesophageal reflux disease    Atrial fibrillation     Hypertension    Low back pain    Encounter for screening for malignant neoplasm of colon    Coagulopathy    Chronic anticoagulation    Hyperlipidemia    PSVT (paroxysmal supraventricular tachycardia)    CKD (chronic kidney disease) stage 3, GFR 30-59 ml/min    Coronary artery disease of native artery of native heart with stable angina pectoris    S/P drug eluting coronary stent placement    Right popliteal artery occlusion    PAD (peripheral artery disease)    Preop testing        Diagnosis Plan   1. PAD (peripheral artery disease)  US Ankle / Brachial Indices Extremity Complete      2. Bilateral carotid artery stenosis  US Carotid Bilateral            Plan: After thoroughly evaluating Fabian Velez Sr., I believe the best course of action is to remain conservative from vascular surgery standpoint.  Overall he is doing well.  He does have complaints of right foot swelling, and neuropathy pain.  We will see him back in 6 months with noninvasive testing to include ABIs and carotid duplex for further surveillance.  He did state that he was previously on Neurontin and somehow he was taken off of it.  He is going to check with his PCP and get his meds realigned.  On his right lateral malleolus he has a stage I pressure ulcer he is a right side sleeper and he did noticed that that area was tender, I did apply a Mepilex and told him to leave that in place for 3 days, I also instructed him to purchase some on [employer] as well as not allow that lateral malleolus to lay on a bed or anything as his bone will push through his skin.  If his skin does break down further he needs to contact me and I will send him to wound care. The patient is to continue taking their medications as previously discussed.  This was all discussed in full with complete understanding.  Thank you for allowing me to participate in the care of your patient.  Please do not hesitate to call with any questions or concerns.  We will keep you aware of any  further encounters with Fabian Velez Sr..        Sincerely yours,         TAI Vick, Kaleb VERGARA MD

## 2024-09-04 ENCOUNTER — OFFICE VISIT (OUTPATIENT)
Dept: VASCULAR SURGERY | Facility: CLINIC | Age: 84
End: 2024-09-04
Payer: MEDICARE

## 2024-09-04 VITALS
SYSTOLIC BLOOD PRESSURE: 114 MMHG | HEART RATE: 53 BPM | BODY MASS INDEX: 23.03 KG/M2 | OXYGEN SATURATION: 98 % | HEIGHT: 72 IN | WEIGHT: 170 LBS | DIASTOLIC BLOOD PRESSURE: 60 MMHG

## 2024-09-04 DIAGNOSIS — M79.89 PAIN AND SWELLING OF RIGHT LOWER EXTREMITY: Primary | ICD-10-CM

## 2024-09-04 DIAGNOSIS — M79.604 PAIN AND SWELLING OF RIGHT LOWER EXTREMITY: Primary | ICD-10-CM

## 2024-09-04 DIAGNOSIS — E78.2 MIXED HYPERLIPIDEMIA: ICD-10-CM

## 2024-09-04 DIAGNOSIS — I10 PRIMARY HYPERTENSION: ICD-10-CM

## 2024-09-04 DIAGNOSIS — S91.001D WOUND OF RIGHT ANKLE, SUBSEQUENT ENCOUNTER: ICD-10-CM

## 2024-09-04 NOTE — PROGRESS NOTES
"9/8/2024      Jeremie Lala MD  [address]  [facility] [address]  [place] [address]        Fabian Velez Sr.  1940    Chief Complaint   Patient presents with    Follow-up     Patient is here with complains of right leg swelling and pain. Had right angio on 8/1/24, states this has been since procedure and gradually gotten worse. Pain radiates from top to bottom of leg. Leg quits on him after walking for period of time. Also tingles and feels numb.        Dear Dr. Jeremie Lala:      I had the pleasure of seeing your patient Fabian Velez Sr. in the office today.   As you recall, Fabian Velez Sr. is a 84 y.o.  male who you are currently following for coronary artery disease s/p cardiac catheterization with stent placement in April.  He returns with complaints of right leg swelling and pain after undergoing right angiogram with balloon angioplasty, and stenting to the popliteal artery  on 8/1/2024, he states since the procedure it is gradually gotten worse.  The pain radiates from top to bottom of the leg.  He states that the leg quits working on him after walking a period of time.  He also has tingling in numbness.  He is maintained on Plavix and Eliquis.    Review of Systems   Constitutional: Negative.  Negative for diaphoresis and fever.   HENT: Negative.     Eyes: Negative.    Respiratory: Negative.  Negative for shortness of breath and wheezing.    Cardiovascular:  Positive for leg swelling (Right lower extremity). Negative for chest pain.   Gastrointestinal: Negative.  Negative for abdominal pain.   Endocrine: Negative.    Genitourinary: Negative.    Musculoskeletal:  Positive for arthralgias, back pain and gait problem.   Skin: Negative.    Allergic/Immunologic: Negative.    Neurological:  Positive for numbness (And tingling bilateral feet). Negative for dizziness and weakness.   Hematological: Negative.    Psychiatric/Behavioral: Negative.       /60   Pulse 53   Ht 182.9 cm (72\")   Wt 77.1 " kg (170 lb)   SpO2 98%   BMI 23.06 kg/m²     Physical Exam  Vitals and nursing note reviewed.   Constitutional:       General: He is not in acute distress.     Appearance: Normal appearance. He is not diaphoretic.   HENT:      Head: Normocephalic. No right periorbital erythema or left periorbital erythema.      Nose: Nose normal.   Eyes:      General: No scleral icterus.     Pupils: Pupils are equal.   Cardiovascular:      Rate and Rhythm: Normal rate and regular rhythm.      Pulses: Normal pulses.           Popliteal pulses are 2+ on the right side and 2+ on the left side.        Dorsalis pedis pulses are detected w/ Doppler on the right side and detected w/ Doppler on the left side.        Posterior tibial pulses are detected w/ Doppler on the right side and detected w/ Doppler on the left side.      Heart sounds: Normal heart sounds. No murmur heard.     Comments: Bilateral peroneal signals dopplered  Pulmonary:      Effort: Pulmonary effort is normal. No respiratory distress.      Breath sounds: Normal breath sounds.   Abdominal:      General: Bowel sounds are normal. There is no distension.      Palpations: Abdomen is soft.      Tenderness: There is no abdominal tenderness. There is no guarding.   Musculoskeletal:         General: No swelling or tenderness. Normal range of motion.      Cervical back: Normal range of motion and neck supple.      Right lower leg: No edema.      Left lower leg: No edema.   Feet:      Right foot:      Skin integrity: Skin integrity normal.      Left foot:      Skin integrity: Skin integrity normal.   Skin:     General: Skin is warm and dry.      Findings: No erythema or rash.   Neurological:      General: No focal deficit present.      Mental Status: He is alert and oriented to person, place, and time. Mental status is at baseline.      Cranial Nerves: No cranial nerve deficit.      Gait: Gait normal.   Psychiatric:         Attention and Perception: Attention normal.         Mood  and Affect: Mood normal.         Behavior: Behavior normal.         Thought Content: Thought content normal.         Judgment: Judgment normal.         Patient Active Problem List   Diagnosis    Benign prostatic hyperplasia with lower urinary tract symptoms    Poor urinary stream    Elevated prostate specific antigen (PSA)    Gastroesophageal reflux disease    Atrial fibrillation    Hypertension    Low back pain    Encounter for screening for malignant neoplasm of colon    Coagulopathy    Chronic anticoagulation    Hyperlipidemia    PSVT (paroxysmal supraventricular tachycardia)    CKD (chronic kidney disease) stage 3, GFR 30-59 ml/min    Coronary artery disease of native artery of native heart with stable angina pectoris    S/P drug eluting coronary stent placement    Right popliteal artery occlusion    PAD (peripheral artery disease)        Diagnosis Plan   1. Pain and swelling of right lower extremity        2. Wound of right ankle, subsequent encounter  Ambulatory Referral to Wound Clinic      3. Primary hypertension        4. Mixed hyperlipidemia              Plan: After thoroughly evaluating Fabian Velez Sr., I believe the best course of action is to remain conservative from vascular surgery standpoint.  I do believe that he may be experiencing some neurogenic pain and may need further lumbar workup.  An x-ray from 8/5/2021 showed severe narrowing of the L5-S1 disc space in addition to lumbar scoliosis to the right.  His right foot is warm, pulses were dopplerable.  During her last visit he was found to have a stage I pressure ulcer to his right lateral malleolus at which time I had applied a Mepilex and instructed him to purchase some and keep pressure off that area.  The area has not worsened, but it has not improved so I will refer him to wound care for further treatment.  I would like him to keep his previously scheduled appointment at which time we will have noninvasive testing to include ABIs and  carotid duplex for further surveillance.  He is maintained on Plavix 75 mg daily and Eliquis 2.5 mg twice daily.  I did discuss vascular risk factors as they pertain to the progression of vascular disease including controlling his hypertension and hyperlipidemia.  His blood pressure is stable today in office.  His lipid panel from 4 months ago shows all values within normal limits except for his HDL which is slightly decreased at 39.  The patient is to continue taking their medications as previously discussed.  This was all discussed in full with complete understanding.  Thank you for allowing me to participate in the care of your patient.  Please do not hesitate to call with any questions or concerns.  We will keep you aware of any further encounters with Fabian Velez Sr..        Sincerely yours,         TAI Vick Jeffrey L, MD

## 2024-09-08 PROBLEM — Z01.818 PREOP TESTING: Status: RESOLVED | Noted: 2024-06-25 | Resolved: 2024-09-08

## 2024-10-02 ENCOUNTER — HOSPITAL ENCOUNTER (OUTPATIENT)
Dept: ULTRASOUND IMAGING | Facility: HOSPITAL | Age: 84
Discharge: HOME | End: 2024-10-02
Admitting: NURSE PRACTITIONER
Payer: MEDICARE

## 2024-10-02 ENCOUNTER — OFFICE VISIT (OUTPATIENT)
Dept: VASCULAR SURGERY | Facility: CLINIC | Age: 84
End: 2024-10-02
Payer: MEDICARE

## 2024-10-02 ENCOUNTER — TELEPHONE (OUTPATIENT)
Dept: VASCULAR SURGERY | Facility: CLINIC | Age: 84
End: 2024-10-02
Payer: MEDICARE

## 2024-10-02 VITALS
HEIGHT: 72 IN | BODY MASS INDEX: 23.03 KG/M2 | OXYGEN SATURATION: 98 % | WEIGHT: 170 LBS | SYSTOLIC BLOOD PRESSURE: 138 MMHG | HEART RATE: 60 BPM | DIASTOLIC BLOOD PRESSURE: 76 MMHG

## 2024-10-02 DIAGNOSIS — I73.9 PAD (PERIPHERAL ARTERY DISEASE): ICD-10-CM

## 2024-10-02 DIAGNOSIS — M79.604 RIGHT LEG PAIN: Primary | ICD-10-CM

## 2024-10-02 DIAGNOSIS — M79.604 PAIN AND SWELLING OF RIGHT LOWER EXTREMITY: ICD-10-CM

## 2024-10-02 DIAGNOSIS — M79.89 PAIN AND SWELLING OF RIGHT LOWER EXTREMITY: ICD-10-CM

## 2024-10-02 DIAGNOSIS — I10 PRIMARY HYPERTENSION: ICD-10-CM

## 2024-10-02 DIAGNOSIS — E78.2 MIXED HYPERLIPIDEMIA: ICD-10-CM

## 2024-10-02 PROCEDURE — 3078F DIAST BP <80 MM HG: CPT | Performed by: NURSE PRACTITIONER

## 2024-10-02 PROCEDURE — 1160F RVW MEDS BY RX/DR IN RCRD: CPT | Performed by: NURSE PRACTITIONER

## 2024-10-02 PROCEDURE — 1159F MED LIST DOCD IN RCRD: CPT | Performed by: NURSE PRACTITIONER

## 2024-10-02 PROCEDURE — 99213 OFFICE O/P EST LOW 20 MIN: CPT | Performed by: NURSE PRACTITIONER

## 2024-10-02 PROCEDURE — 3075F SYST BP GE 130 - 139MM HG: CPT | Performed by: NURSE PRACTITIONER

## 2024-10-02 PROCEDURE — 93926 LOWER EXTREMITY STUDY: CPT

## 2024-10-02 NOTE — PROGRESS NOTES
"10/2/2024      Kaleb Villavicencio MD  [address] [facility] [address]          Fabian Velez Sr.  1940    Chief Complaint   Patient presents with    Follow-up     2 week follow up w/ testing. Last seen 9/4/24. Patient complains of pain in right leg making it difficult to walk.        Dear Dr. Kaleb Villavicencio:      I had the pleasure of seeing your patient Fabian Velez Sr. in the office today.   As you recall, Fabian Velez Sr. is a 84 y.o.  male who you are currently following for routine health maintenance.  He returns today with complaints of pain to his right lower extremity radiating from the top to the bottom of the leg.  He states he cannot walk very far due to the pain.  He states his leg gives out on him.  This is not a new complaint for him, previously I told him that I felt this was related to a back issue.  He is here today with testing.  He did undergo right angiogram with balloon angioplasty, and stenting to the popliteal artery  on 8/1/2024.  He also complains of tingling and numbness.  He is maintained on Plavix and Eliquis.  He did have noninvasive testing performed today, which I did review in office.     Review of Systems   Constitutional: Negative.  Negative for diaphoresis and fever.   HENT: Negative.     Eyes: Negative.    Respiratory: Negative.  Negative for shortness of breath and wheezing.    Cardiovascular: Negative.  Negative for chest pain and leg swelling.   Gastrointestinal: Negative.  Negative for abdominal pain.   Endocrine: Negative.    Genitourinary: Negative.    Musculoskeletal:  Positive for arthralgias, back pain and gait problem.        Right lower extremity pain   Skin: Negative.    Allergic/Immunologic: Negative.    Neurological:  Positive for numbness (And tingling to bilateral feet). Negative for dizziness and weakness.   Hematological: Negative.    Psychiatric/Behavioral: Negative.       /76   Pulse 60   Ht 182.9 cm (72\")   Wt 77.1 kg (170 lb)   " SpO2 98%   BMI 23.06 kg/m²     Physical Exam  Vitals and nursing note reviewed.   Constitutional:       General: He is not in acute distress.     Appearance: Normal appearance. He is not diaphoretic.   HENT:      Head: Normocephalic. No right periorbital erythema or left periorbital erythema.      Nose: Nose normal.   Eyes:      General: No scleral icterus.     Pupils: Pupils are equal.   Cardiovascular:      Rate and Rhythm: Normal rate and regular rhythm.      Pulses: Normal pulses.      Heart sounds: Normal heart sounds. No murmur heard.  Pulmonary:      Effort: Pulmonary effort is normal. No respiratory distress.      Breath sounds: Normal breath sounds.   Abdominal:      General: Bowel sounds are normal. There is no distension.      Palpations: Abdomen is soft.      Tenderness: There is no abdominal tenderness. There is no guarding.   Musculoskeletal:         General: No swelling or tenderness. Normal range of motion.      Cervical back: Normal range of motion and neck supple.      Right lower leg: No edema.      Left lower leg: No edema.   Feet:      Right foot:      Skin integrity: Skin integrity normal.      Left foot:      Skin integrity: Skin integrity normal.   Skin:     General: Skin is warm and dry.      Findings: No erythema or rash.   Neurological:      General: No focal deficit present.      Mental Status: He is alert and oriented to person, place, and time. Mental status is at baseline.      Cranial Nerves: No cranial nerve deficit.      Gait: Gait normal.   Psychiatric:         Attention and Perception: Attention normal.         Mood and Affect: Mood normal.         Behavior: Behavior normal.         Thought Content: Thought content normal.         Judgment: Judgment normal.     DIAGNOSTIC DATA  Arterial duplex shows right lower extremity popliteal stent patent along with the rest of the arterial system    Patient Active Problem List   Diagnosis    Benign prostatic hyperplasia with lower urinary  tract symptoms    Poor urinary stream    Elevated prostate specific antigen (PSA)    Gastroesophageal reflux disease    Atrial fibrillation    Hypertension    Low back pain    Encounter for screening for malignant neoplasm of colon    Coagulopathy    Chronic anticoagulation    Hyperlipidemia    PSVT (paroxysmal supraventricular tachycardia)    CKD (chronic kidney disease) stage 3, GFR 30-59 ml/min    Coronary artery disease of native artery of native heart with stable angina pectoris    S/P drug eluting coronary stent placement    Right popliteal artery occlusion    PAD (peripheral artery disease)        Diagnosis Plan   1. Right leg pain        2. PAD (peripheral artery disease)        3. Primary hypertension        4. Mixed hyperlipidemia                Plan: After thoroughly evaluating Fabian Velez Sr., I believe the best course of action is to remain conservative from vascular surgery standpoint.  I did review his testing and arterial duplex shows that his right lower extremity popliteal stent is patent along with the rest of his arterial system in that right lower extremity.  I do believe that he may be experiencing neurogenic pain.  He does have an MRI scheduled through his PCP this Friday per his report.  He was followed by neurosurgery here back in 2021, he could not tell me who he last saw.  Per the records I could see the last time he was seen was between neurosurgery at [facility] and at [facility].  An x-ray from 8/5/2021 showed severe narrowing of the L5-S1 disc space in addition to lumbar scoliosis to the right.  We will see him back in February with noninvasive testing to include ABIs and carotid duplex for further surveillance.  He is maintained on Plavix 75 mg daily and Eliquis 2.5 mg twice daily.  I did discuss vascular risk factors as a pertains to the progression of vascular disease including controlling his hypertension and hyperlipidemia.  His blood pressure is stable today in office.  His  lipid panel shows all values within normal limits except for his HDL which is slightly decreased at 39.  The patient is to continue taking their medications as previously discussed.  This was all discussed in full with complete understanding.    Thank you for allowing me to participate in the care of your patient.  Please do not hesitate to call with any questions or concerns.  We will keep you aware of any further encounters with Fabian Velez Sr..        Sincerely yours,         TAI Vick Jeffrey L, MD

## 2024-10-02 NOTE — TELEPHONE ENCOUNTER
Patient called and stated he was having pain in his right calf that bwas causing him to fall and he is having to use a walker to get aroud and is having frequent falls, stated it was fine post op angio but has gotten to this stage in a few weeks

## 2024-10-07 ENCOUNTER — APPOINTMENT (OUTPATIENT)
Dept: GENERAL RADIOLOGY | Facility: HOSPITAL | Age: 84
End: 2024-10-07
Payer: MEDICARE

## 2024-10-07 ENCOUNTER — HOSPITAL ENCOUNTER (EMERGENCY)
Facility: HOSPITAL | Age: 84
Discharge: HOME | End: 2024-10-07
Attending: FAMILY MEDICINE | Admitting: FAMILY MEDICINE
Payer: MEDICARE

## 2024-10-07 ENCOUNTER — APPOINTMENT (OUTPATIENT)
Dept: CT IMAGING | Facility: HOSPITAL | Age: 84
End: 2024-10-07
Payer: MEDICARE

## 2024-10-07 VITALS
TEMPERATURE: 98 F | SYSTOLIC BLOOD PRESSURE: 150 MMHG | HEIGHT: 72 IN | HEART RATE: 58 BPM | BODY MASS INDEX: 23.16 KG/M2 | DIASTOLIC BLOOD PRESSURE: 100 MMHG | WEIGHT: 171 LBS | OXYGEN SATURATION: 96 % | RESPIRATION RATE: 16 BRPM

## 2024-10-07 DIAGNOSIS — R29.898 WEAKNESS OF BOTH LOWER EXTREMITIES: ICD-10-CM

## 2024-10-07 DIAGNOSIS — M51.369 DEGENERATION OF INTERVERTEBRAL DISC OF LUMBAR REGION, UNSPECIFIED WHETHER PAIN PRESENT: ICD-10-CM

## 2024-10-07 DIAGNOSIS — N18.32 CHRONIC RENAL FAILURE (CRF), STAGE 3B: ICD-10-CM

## 2024-10-07 DIAGNOSIS — M47.819 ARTHRITIS OF LOW BACK: ICD-10-CM

## 2024-10-07 DIAGNOSIS — R29.6 FREQUENT FALLS: Primary | ICD-10-CM

## 2024-10-07 LAB
ALBUMIN SERPL-MCNC: 3.6 G/DL (ref 3.5–5.2)
ALBUMIN/GLOB SERPL: 1.2 G/DL
ALP SERPL-CCNC: 365 U/L (ref 39–117)
ALT SERPL W P-5'-P-CCNC: 34 U/L (ref 1–41)
ANION GAP SERPL CALCULATED.3IONS-SCNC: 8 MMOL/L (ref 5–15)
AST SERPL-CCNC: 37 U/L (ref 1–40)
BACTERIA UR QL AUTO: ABNORMAL /HPF
BASOPHILS # BLD AUTO: 0.04 10*3/MM3 (ref 0–0.2)
BASOPHILS NFR BLD AUTO: 0.6 % (ref 0–1.5)
BILIRUB SERPL-MCNC: 0.6 MG/DL (ref 0–1.2)
BILIRUB UR QL STRIP: NEGATIVE
BUN SERPL-MCNC: 27 MG/DL (ref 8–23)
BUN/CREAT SERPL: 12.9 (ref 7–25)
CALCIUM SPEC-SCNC: 10.4 MG/DL (ref 8.6–10.5)
CHLORIDE SERPL-SCNC: 101 MMOL/L (ref 98–107)
CLARITY UR: CLEAR
CO2 SERPL-SCNC: 25 MMOL/L (ref 22–29)
COD CRY URNS QL: ABNORMAL /HPF
COLOR UR: ABNORMAL
CREAT SERPL-MCNC: 2.09 MG/DL (ref 0.76–1.27)
DEPRECATED RDW RBC AUTO: 45 FL (ref 37–54)
EGFRCR SERPLBLD CKD-EPI 2021: 30.8 ML/MIN/1.73
EOSINOPHIL # BLD AUTO: 0.66 10*3/MM3 (ref 0–0.4)
EOSINOPHIL NFR BLD AUTO: 9.9 % (ref 0.3–6.2)
ERYTHROCYTE [DISTWIDTH] IN BLOOD BY AUTOMATED COUNT: 13.4 % (ref 12.3–15.4)
GLOBULIN UR ELPH-MCNC: 3 GM/DL
GLUCOSE BLDC GLUCOMTR-MCNC: 92 MG/DL (ref 70–130)
GLUCOSE SERPL-MCNC: 124 MG/DL (ref 65–99)
GLUCOSE UR STRIP-MCNC: NEGATIVE MG/DL
HCT VFR BLD AUTO: 40.7 % (ref 37.5–51)
HGB BLD-MCNC: 12.9 G/DL (ref 13–17.7)
HGB UR QL STRIP.AUTO: NEGATIVE
HOLD SPECIMEN: NORMAL
HOLD SPECIMEN: NORMAL
HYALINE CASTS UR QL AUTO: ABNORMAL /LPF
IMM GRANULOCYTES # BLD AUTO: 0.02 10*3/MM3 (ref 0–0.05)
IMM GRANULOCYTES NFR BLD AUTO: 0.3 % (ref 0–0.5)
KETONES UR QL STRIP: ABNORMAL
LEUKOCYTE ESTERASE UR QL STRIP.AUTO: ABNORMAL
LYMPHOCYTES # BLD AUTO: 1.33 10*3/MM3 (ref 0.7–3.1)
LYMPHOCYTES NFR BLD AUTO: 20 % (ref 19.6–45.3)
MAGNESIUM SERPL-MCNC: 2.1 MG/DL (ref 1.6–2.4)
MCH RBC QN AUTO: 28.8 PG (ref 26.6–33)
MCHC RBC AUTO-ENTMCNC: 31.7 G/DL (ref 31.5–35.7)
MCV RBC AUTO: 90.8 FL (ref 79–97)
MONOCYTES # BLD AUTO: 1.07 10*3/MM3 (ref 0.1–0.9)
MONOCYTES NFR BLD AUTO: 16.1 % (ref 5–12)
NEUTROPHILS NFR BLD AUTO: 3.53 10*3/MM3 (ref 1.7–7)
NEUTROPHILS NFR BLD AUTO: 53.1 % (ref 42.7–76)
NITRITE UR QL STRIP: NEGATIVE
NRBC BLD AUTO-RTO: 0 /100 WBC (ref 0–0.2)
PH UR STRIP.AUTO: 5.5 [PH] (ref 5–8)
PLATELET # BLD AUTO: 229 10*3/MM3 (ref 140–450)
PMV BLD AUTO: 9.9 FL (ref 6–12)
POTASSIUM SERPL-SCNC: 3.8 MMOL/L (ref 3.5–5.2)
PROT SERPL-MCNC: 6.6 G/DL (ref 6–8.5)
PROT UR QL STRIP: ABNORMAL
RBC # BLD AUTO: 4.48 10*6/MM3 (ref 4.14–5.8)
RBC # UR STRIP: ABNORMAL /HPF
REF LAB TEST METHOD: ABNORMAL
SODIUM SERPL-SCNC: 134 MMOL/L (ref 136–145)
SP GR UR STRIP: 1.02 (ref 1–1.03)
SQUAMOUS #/AREA URNS HPF: ABNORMAL /HPF
TROPONIN T SERPL HS-MCNC: 30 NG/L
TROPONIN T SERPL HS-MCNC: 35 NG/L
UROBILINOGEN UR QL STRIP: ABNORMAL
WBC # UR STRIP: ABNORMAL /HPF
WBC NRBC COR # BLD AUTO: 6.65 10*3/MM3 (ref 3.4–10.8)
WHOLE BLOOD HOLD COAG: NORMAL
WHOLE BLOOD HOLD SPECIMEN: NORMAL

## 2024-10-07 PROCEDURE — 99284 EMERGENCY DEPT VISIT MOD MDM: CPT

## 2024-10-07 PROCEDURE — 80053 COMPREHEN METABOLIC PANEL: CPT

## 2024-10-07 PROCEDURE — 83735 ASSAY OF MAGNESIUM: CPT

## 2024-10-07 PROCEDURE — 84484 ASSAY OF TROPONIN QUANT: CPT | Performed by: FAMILY MEDICINE

## 2024-10-07 PROCEDURE — 71045 X-RAY EXAM CHEST 1 VIEW: CPT

## 2024-10-07 PROCEDURE — 70450 CT HEAD/BRAIN W/O DYE: CPT

## 2024-10-07 PROCEDURE — 72100 X-RAY EXAM L-S SPINE 2/3 VWS: CPT

## 2024-10-07 PROCEDURE — 36415 COLL VENOUS BLD VENIPUNCTURE: CPT

## 2024-10-07 PROCEDURE — 81001 URINALYSIS AUTO W/SCOPE: CPT

## 2024-10-07 PROCEDURE — 93005 ELECTROCARDIOGRAM TRACING: CPT

## 2024-10-07 PROCEDURE — 85025 COMPLETE CBC W/AUTO DIFF WBC: CPT

## 2024-10-07 PROCEDURE — 82948 REAGENT STRIP/BLOOD GLUCOSE: CPT

## 2024-10-07 PROCEDURE — 93010 ELECTROCARDIOGRAM REPORT: CPT | Performed by: EMERGENCY MEDICINE

## 2024-10-07 PROCEDURE — 84484 ASSAY OF TROPONIN QUANT: CPT

## 2024-10-07 RX ORDER — SODIUM CHLORIDE 0.9 % (FLUSH) 0.9 %
10 SYRINGE (ML) INJECTION AS NEEDED
Status: DISCONTINUED | OUTPATIENT
Start: 2024-10-07 | End: 2024-10-07 | Stop reason: HOSPADM

## 2024-10-07 NOTE — ED PROVIDER NOTES
CHIEF COMPLAINT  Chief Complaint   Patient presents with    Weakness - Generalized    Fall     HPI  Fabian Velez Sr. is a 84 y.o. white male who presents with generalized weakness and frequent falls.  Patient fell and hit the back of his head on the concrete no loss of consciousness.  Patient states he does take blood thinners.  Patient sees cardiologist Dr. Lala.  Patient also when he fell hurt his low back.  No loss of bowel or bladder control.  Patient does have a history of chronic renal failure.  Patient has a mild headache and it is 3 out of 10.  No visual changes.    EMS PRE-ARRIVAL TREATMENT:       REVIEW OF SYSTEMS  CONSTITUTIONAL: Positive for generalized weakness   EYES:  No complaints of discharge   ENT: No complaints of sore throat or ear pain  CARDIOVASCULAR:  No complaints of chest pain, palpitations, or swelling  RESPIRATORY:  No complaints of cough or shortness of breath  GI:  No complaints of abdominal pain, nausea, vomiting, or diarrhea  MUSCULOSKELETAL: For low back pain after fall.    SKIN:  No complaints of rash  NEUROLOGIC: Positive for dizziness and headache after fall and head trauma yesterday   ENDOCRINE:  No complaints of polyuria or polydipsia  LYMPHATIC:  No complaints of swollen glands  GENITOURINARY: No complaints of urinary frequency or hematuria      PAST MEDICAL HISTORY  Past Medical History:   Diagnosis Date    Arthritis     Atrial fibrillation     Coronary artery disease     Diverticulosis     Elevated cholesterol     GERD (gastroesophageal reflux disease)     Hyperlipidemia     Hypertension     Plantar fasciitis     Shingles     Skin cancer     Urinary hesitancy     Urticaria        FAMILY HISTORY  Family History   Problem Relation Age of Onset    Crohn's disease Son     Heart disease Father     Cancer Mother     Stroke Brother     Colon cancer Neg Hx     Colon polyps Neg Hx        SOCIAL HISTORY  Social History     Socioeconomic History    Marital status:    Tobacco  Use    Smoking status: Former     Current packs/day: 0.50     Average packs/day: 0.5 packs/day for 3.8 years (1.9 ttl pk-yrs)     Types: Cigarettes     Start date: 2021     Quit date: 1961    Smokeless tobacco: Former     Types: Chew     Quit date: 2021   Vaping Use    Vaping status: Never Used   Substance and Sexual Activity    Alcohol use: Yes     Alcohol/week: 10.0 - 12.0 standard drinks of alcohol     Types: 10 - 12 Cans of beer per week    Drug use: No    Sexual activity: Defer       IMMUNIZATION HISTORY  Deferred to primary care physician.    SURGICAL HISTORY  Past Surgical History:   Procedure Laterality Date    AORTAGRAM Right 8/1/2024    Procedure: Right lower extremity angiogram;  Surgeon: Isaac Ba DO;  Location: [facility] HYBRID OR;  Service: Vascular;  Laterality: Right;    APPENDECTOMY      CARDIAC CATHETERIZATION N/A 04/15/2024    Procedure: Coronary angiography;  Surgeon: Jeremie Lala MD;  Location:  [facility] CATH INVASIVE LOCATION;  Service: Cardiology;  Laterality: N/A;    CARDIAC CATHETERIZATION N/A 04/15/2024    Procedure: Percutaneous Coronary Intervention;  Surgeon: Jeremie Lala MD;  Location:  [facility] CATH INVASIVE LOCATION;  Service: Cardiology;  Laterality: N/A;    COLONOSCOPY  06/02/2016    5yr colon recall based on prep adequate to identify polyps greater than 6 mm    ENDOSCOPY  06/14/2011    INGUINAL HERNIA REPAIR      REPLACEMENT TOTAL KNEE Bilateral     SHOULDER SURGERY Bilateral        CURRENT MEDICATIONS    Current Facility-Administered Medications:     sodium chloride 0.9 % flush 10 mL, 10 mL, Intravenous, PRN, Emergency, Triage Protocol, MD    Current Outpatient Medications:     acetaminophen (TYLENOL) 500 MG tablet, Take 2 tablets by mouth Every 6 (Six) Hours As Needed for Mild Pain., Disp: , Rfl:     amiodarone (PACERONE) 200 MG tablet, Take 200 mg daily., Disp: 90 tablet, Rfl: 3    apixaban (ELIQUIS) 2.5 MG tablet tablet, Take 1 tablet by mouth Every 12 (Twelve) Hours.  "Indications: Atrial Fibrillation, Disp: 60 tablet, Rfl: 0    baclofen (LIORESAL) 10 MG tablet, Take 1 tablet by mouth At Night As Needed for Muscle Spasms., Disp: , Rfl:     Cholecalciferol (VITAMIN D3 PO), Take 1 tablet by mouth Daily., Disp: , Rfl:     clopidogrel (PLAVIX) 75 MG tablet, Take 1 tablet by mouth Daily., Disp: 90 tablet, Rfl: 3    dilTIAZem CD (CARDIZEM CD) 360 MG 24 hr capsule, Take 1 capsule by mouth Daily., Disp: 90 capsule, Rfl: 3    metoprolol tartrate (LOPRESSOR) 25 MG tablet, Take 1 tablet by mouth 2 (Two) Times a Day., Disp: 180 tablet, Rfl: 3    nitroglycerin (NITROSTAT) 0.4 MG SL tablet, Place 1 tablet under the tongue Every 5 (Five) Minutes As Needed for Chest Pain (Systolic BP Greater Than 100). Take no more than 3 doses in 15 minutes., Disp: 25 tablet, Rfl: 1    omeprazole (priLOSEC) 20 MG capsule, Take 1 capsule by mouth Daily., Disp: 90 capsule, Rfl: 0    ranolazine (RANEXA) 500 MG 12 hr tablet, Take 1 tablet by mouth Every 12 (Twelve) Hours., Disp: , Rfl:     tamsulosin (FLOMAX) 0.4 MG capsule 24 hr capsule, Take 1 capsule by mouth Daily., Disp: 90 capsule, Rfl: 3    traMADol (ULTRAM) 50 MG tablet, Take 1 tablet by mouth Every 8 (Eight) Hours As Needed for Moderate Pain., Disp: , Rfl:     VITAMIN E PO, Take 1 tablet by mouth Daily., Disp: , Rfl:     ALLERGIES  Allergies   Allergen Reactions    Contrast Dye (Echo Or Unknown Ct/Mr) Other (See Comments)     flush       PHYSICAL EXAM  VITAL SIGNS:   /66 (Patient Position: Standing)   Pulse 51   Temp 98.7 °F (37.1 °C) (Oral)   Resp 18   Ht 182.9 cm (72\")   Wt 77.6 kg (171 lb)   SpO2 98%   BMI 23.19 kg/m²     Constitutional: Well developed. Well nourished. Alert.    HEENT: Normocephalic. Atraumatic. Oropharynx clear. Bilateral external ears normal. Oropharynx moist. Uvula in the midline position. Nose normal.     Eyes: PERRLA. EOMI. Conjunctiva normal. No discharge.     Neck: Supple. Normal range of motion. No tenderness. " Trachea midline.    Cardiovascular: Normal heart rate. Normal rhythm. No murmurs. No rubs. No gallops.    Thorax & Lungs: Equal bilateral breath sounds. No respiratory distress. No retractions. No wheezes. No rales. No rhonchi.     Skin: Warm. No erythema. No rash. Normal color.     Abdomen: Normal bowel sounds. Soft. No distention. No tenderness to palpation. No guarding. No rebound. No palpable or pulsatile masses.    Extremities: Intact distal pulses. No edema. No tenderness. No deformities noted.     Musculoskeletal: No tenderness to palpation of the 4 extremities.  There is tenderness palpation of the lower lumbar spine at L4-L5.  No gross deformity.  Negative straight leg raise.       Neurologic: Alert & appropriate. Normal motor function. Normal sensory function. No focal deficits noted. Cranial nerves intact. Speech is clear.         RADIOLOGY/PROCEDURES        CT Head Without Contrast   Final Result   1. No acute intracranial abnormality, mild diffuse atrophy and chronic   small vessel white matter ischemic changes.           This report was signed and finalized on 10/7/2024 4:30 PM by Dr. Silvestre Jarrell MD.          XR Spine Lumbar 2 or 3 View   Final Result   Multilevel degenerative changes, most advanced at L5-S1 with   disc space collapse, endplate sclerosis and mild spurring. Multilevel   facet sclerosis in the lower lumbar spine. No fracture or acute osseous   findings. There is mild dextroscoliosis.       This report was signed and finalized on 10/7/2024 3:55 PM by Dr. Silvestre Jarrell MD.          XR Chest 1 View   Final Result   1. No active cardiopulmonary disease.           This report was signed and finalized on 10/7/2024 2:37 PM by Dr. Lindsay Wing MD.                 FUTURE APPOINTMENTS     Future Appointments   Date Time Provider Department Center   10/16/2024  2:00 PM Jenny Linares APRN [facility]   10/22/2024 10:15 AM Kami Ovalles APRN [facility]   11/1/2024  9:30  AM Delmy De La Vega APRN [facility]   2/12/2025 11:15 AM Shobha Red APRN [facility]            COURSE & MEDICAL DECISION MAKING     Patient's partial differential diagnosis can include:    Generalized weakness, frequent falls, head trauma, volume depletion, orthostatic, electrolyte abnormality, and others.      Discussed with the patient the results of the patient's laboratory and radiological test..  All questions were answered.  I do feel the patient would be better served to have a referral to neurology due to his frequent falls and progressing weakness.  He states that he has an upcoming MRI of his low back.  However I do believe that this would only answer part of the question.  I think that the patient would also benefit from a MRI of the brain.  However I will place a referral into neurology where the patient can make a call and get an appointment.  They can determine what would be the best course of action to the evaluate the patient's progressing weakness and frequent falls.    The patient and the wife the patient who are at the bedside feel comfortable and agree with this plan of action and the discharge planning.        Patient's level of risk: Moderate        CRITICAL CARE    CRITICAL CARE: No    CRITICAL CARE TIME: None      Also Old charts were reviewed per [facility] EMR.  Pertinent details are summarized above.  All laboratory, radiologic, and EKG studies that were performed in the Emergency Department were a necessary part of the evaluation needed to exclude unstable or  emergent medical conditions:     Patient was hemodynamically and neurologically stable in the ED.   Pertinent studies were reviewed as above.     Recent Results (from the past 24 hour(s))   Comprehensive Metabolic Panel    Collection Time: 10/07/24 12:52 PM    Specimen: Arm, Left; Blood   Result Value Ref Range    Glucose 124 (H) 65 - 99 mg/dL    BUN 27 (H) 8 - 23 mg/dL    Creatinine 2.09 (H) 0.76 - 1.27 mg/dL    Sodium 134 (L)  136 - 145 mmol/L    Potassium 3.8 3.5 - 5.2 mmol/L    Chloride 101 98 - 107 mmol/L    CO2 25.0 22.0 - 29.0 mmol/L    Calcium 10.4 8.6 - 10.5 mg/dL    Total Protein 6.6 6.0 - 8.5 g/dL    Albumin 3.6 3.5 - 5.2 g/dL    ALT (SGPT) 34 1 - 41 U/L    AST (SGOT) 37 1 - 40 U/L    Alkaline Phosphatase 365 (H) 39 - 117 U/L    Total Bilirubin 0.6 0.0 - 1.2 mg/dL    Globulin 3.0 gm/dL    A/G Ratio 1.2 g/dL    BUN/Creatinine Ratio 12.9 7.0 - 25.0    Anion Gap 8.0 5.0 - 15.0 mmol/L    eGFR 30.8 (L) >60.0 mL/min/1.73   Single High Sensitivity Troponin T    Collection Time: 10/07/24 12:52 PM    Specimen: Arm, Left; Blood   Result Value Ref Range    HS Troponin T 35 (H) <22 ng/L   Magnesium    Collection Time: 10/07/24 12:52 PM    Specimen: Arm, Left; Blood   Result Value Ref Range    Magnesium 2.1 1.6 - 2.4 mg/dL   Green Top (Gel)    Collection Time: 10/07/24 12:52 PM   Result Value Ref Range    Extra Tube Hold for add-ons.    Lavender Top    Collection Time: 10/07/24 12:52 PM   Result Value Ref Range    Extra Tube hold for add-on    Red Top    Collection Time: 10/07/24 12:52 PM   Result Value Ref Range    Extra Tube Hold for add-ons.    Light Blue Top    Collection Time: 10/07/24 12:52 PM   Result Value Ref Range    Extra Tube Hold for add-ons.    CBC Auto Differential    Collection Time: 10/07/24 12:52 PM    Specimen: Arm, Left; Blood   Result Value Ref Range    WBC 6.65 3.40 - 10.80 10*3/mm3    RBC 4.48 4.14 - 5.80 10*6/mm3    Hemoglobin 12.9 (L) 13.0 - 17.7 g/dL    Hematocrit 40.7 37.5 - 51.0 %    MCV 90.8 79.0 - 97.0 fL    MCH 28.8 26.6 - 33.0 pg    MCHC 31.7 31.5 - 35.7 g/dL    RDW 13.4 12.3 - 15.4 %    RDW-SD 45.0 37.0 - 54.0 fl    MPV 9.9 6.0 - 12.0 fL    Platelets 229 140 - 450 10*3/mm3    Neutrophil % 53.1 42.7 - 76.0 %    Lymphocyte % 20.0 19.6 - 45.3 %    Monocyte % 16.1 (H) 5.0 - 12.0 %    Eosinophil % 9.9 (H) 0.3 - 6.2 %    Basophil % 0.6 0.0 - 1.5 %    Immature Grans % 0.3 0.0 - 0.5 %    Neutrophils, Absolute 3.53  1.70 - 7.00 10*3/mm3    Lymphocytes, Absolute 1.33 0.70 - 3.10 10*3/mm3    Monocytes, Absolute 1.07 (H) 0.10 - 0.90 10*3/mm3    Eosinophils, Absolute 0.66 (H) 0.00 - 0.40 10*3/mm3    Basophils, Absolute 0.04 0.00 - 0.20 10*3/mm3    Immature Grans, Absolute 0.02 0.00 - 0.05 10*3/mm3    nRBC 0.0 0.0 - 0.2 /100 WBC   ECG 12 Lead ED Triage Standing Order; Weak / Dizzy / AMS    Collection Time: 10/07/24 12:55 PM   Result Value Ref Range    QT Interval 490 ms    QTC Interval 446 ms   Urinalysis With Microscopic If Indicated (No Culture) - Urine, Clean Catch    Collection Time: 10/07/24  2:43 PM    Specimen: Urine, Clean Catch   Result Value Ref Range    Color, UA Dark Yellow (A) Yellow, Straw    Appearance, UA Clear Clear    pH, UA 5.5 5.0 - 8.0    Specific Gravity, UA 1.021 1.005 - 1.030    Glucose, UA Negative Negative    Ketones, UA Trace (A) Negative    Bilirubin, UA Negative Negative    Blood, UA Negative Negative    Protein, UA 30 mg/dL (1+) (A) Negative    Leuk Esterase, UA Small (1+) (A) Negative    Nitrite, UA Negative Negative    Urobilinogen, UA 1.0 E.U./dL 0.2 - 1.0 E.U./dL   Urinalysis, Microscopic Only - Urine, Clean Catch    Collection Time: 10/07/24  2:43 PM    Specimen: Urine, Clean Catch   Result Value Ref Range    RBC, UA None Seen None Seen, 0-2 /HPF    WBC, UA 3-5 (A) None Seen, 0-2 /HPF    Bacteria, UA None Seen None Seen /HPF    Squamous Epithelial Cells, UA 0-2 None Seen, 0-2 /HPF    Hyaline Casts, UA 0-2 None Seen /LPF    Calcium Oxalate Crystals, UA Moderate/2+ None Seen /HPF    Methodology Manual Light Microscopy    POC Glucose Once    Collection Time: 10/07/24  3:01 PM    Specimen: Blood   Result Value Ref Range    Glucose 92 70 - 130 mg/dL   Single High Sensitivity Troponin T    Collection Time: 10/07/24  4:16 PM    Specimen: Arm, Left; Blood   Result Value Ref Range    HS Troponin T 30 (H) <22 ng/L       The patient received:  Medications   sodium chloride 0.9 % flush 10 mL (has no  administration in time range)            Disposition: Discharge home      Dragon disclaimer:  Part of this note may be an electronic transcription/translation of spoken language to printed text using the Dragon Dictation System.     I have reviewed the patient’s prescription history via a prescription monitoring program.  This information is consistent with my knowledge of the patient’s controlled substance use history.    Patient evaluate during Coronavirus Pandemic. Isolation practices followed according to [facility] policy.     FINAL IMPRESSION   Diagnosis Plan   1. Frequent falls        2. Degeneration of intervertebral disc of lumbar region, unspecified whether pain present        3. Arthritis of low back        4. Chronic renal failure (CRF), stage 3b        5. Weakness of both lower extremities              MD Tim Valle Jr, Thomas Mark Jr., MD  10/07/24 3141

## 2024-10-07 NOTE — DISCHARGE INSTRUCTIONS
Call the neurologist above to see when you are able to be evaluated.  Which are increasing falls and weakness in the lower extremities I do feel that an MRI of your brain as well as your lower back should be performed for further evaluation.  According to which you have told me you have an upcoming MRI of your low back on this coming Friday.  I do believe that this is a good start however it would be better if you also was able to get a MRI of your brain to further assess everything from your frequent falls.  Please call the neurologist above that is being referred to you to determine the time that she will be able to be seen and evaluated for this.  They may be able to review your case and add the MRI or modify your current order to determine which MRI would be better for further evaluation of your symptoms.

## 2024-10-08 LAB
QT INTERVAL: 490 MS
QTC INTERVAL: 446 MS

## 2024-10-18 ENCOUNTER — NURSE TRIAGE (OUTPATIENT)
Dept: CALL CENTER | Facility: HOSPITAL | Age: 84
End: 2024-10-18
Payer: MEDICARE

## 2024-10-18 NOTE — TELEPHONE ENCOUNTER
"Reason for Disposition  • [1] Follow-up call to recent contact AND [2] information only call, no triage required    Additional Information  • Negative: [1] Caller is not with the adult (patient) AND [2] reporting urgent symptoms  • Negative: Lab result questions  • Negative: Medication questions  • Negative: Caller can't be reached by phone  • Negative: Caller has already spoken to PCP or another triager  • Negative: RN needs further essential information from caller in order to complete triage  • Negative: Requesting regular office appointment  • Negative: [1] Caller requesting NON-URGENT health information AND [2] PCP's office is the best resource  • Negative: Health Information question, no triage required and triager able to answer question  • Negative: General information question, no triage required and triager able to answer question  • Negative: Question about upcoming scheduled test, no triage required and triager able to answer question  • Negative: [1] Caller is not with the adult (patient) AND [2] probable NON-URGENT symptoms    Answer Assessment - Initial Assessment Questions  1. REASON FOR CALL or QUESTION: \"What is your reason for calling today?\" or \"How can I best help you?\" or \"What question do you have that I can help answer?\"  Message left on [employer]    Di from [facility] is calling Dana asking for referral to be   re faxed to   Unable to read the previous fax.    No phone numbner left to return call.    Will send note to Dr Villavicencio office for referal to be re faxed.    Protocols used: Information Only Call - No Triage-ADULT-    "

## 2024-11-01 ENCOUNTER — TELEPHONE (OUTPATIENT)
Dept: GASTROENTEROLOGY | Facility: CLINIC | Age: 84
End: 2024-11-01
Payer: MEDICARE

## 2024-11-01 NOTE — TELEPHONE ENCOUNTER
I signed the order but when I did this in feb pharmacy told me what I was sending was not covered and told me to send in a whole new glucometer kit that was covered under their insurance different from the one they had before, which I did and then patients daughter got upset when they were told to pay for the new kit and refused to do so so it so lets see what happens this time.    Patient wants to reschedule

## 2024-11-06 ENCOUNTER — OFFICE VISIT (OUTPATIENT)
Dept: WOUND CARE | Facility: HOSPITAL | Age: 84
End: 2024-11-06
Payer: MEDICARE

## 2024-11-06 PROCEDURE — G0463 HOSPITAL OUTPT CLINIC VISIT: HCPCS

## 2024-12-05 ENCOUNTER — TELEPHONE (OUTPATIENT)
Dept: CARDIOLOGY | Facility: CLINIC | Age: 84
End: 2024-12-05
Payer: MEDICARE

## 2024-12-05 NOTE — TELEPHONE ENCOUNTER
Caller: FLORENTINO ZUNIGA    Relationship to patient: Emergency Contact    Best call back number: [phone]      Type of visit: FOLLOW UP     Requested date: WEDNESDAY OR THURSDAY AFTER 2:30        Additional notes:PATIENT WAS SCHEDULED FOR 12/9/24 BUT THAT IS TOO EARLY. [facility] CANCELED APPOINTMENT AND DIDN'T RESCHEDULE BECAUSE AVAILABLE TIMES DIDN'T WORK TO RESCHEDULE.THE PATIENT HAS TO HAVE HIS SON BRING HIM AND HE DOESN'T GET OFF WORK UNTIL 2:30.

## 2025-01-22 ENCOUNTER — OFFICE VISIT (OUTPATIENT)
Dept: CARDIOLOGY | Facility: CLINIC | Age: 85
End: 2025-01-22
Payer: MEDICARE

## 2025-01-22 VITALS
BODY MASS INDEX: 21.18 KG/M2 | WEIGHT: 156.4 LBS | DIASTOLIC BLOOD PRESSURE: 66 MMHG | SYSTOLIC BLOOD PRESSURE: 114 MMHG | HEIGHT: 72 IN | OXYGEN SATURATION: 93 % | HEART RATE: 81 BPM

## 2025-01-22 DIAGNOSIS — I25.10 CORONARY ARTERY DISEASE INVOLVING NATIVE CORONARY ARTERY OF NATIVE HEART WITHOUT ANGINA PECTORIS: Primary | ICD-10-CM

## 2025-01-22 DIAGNOSIS — I10 PRIMARY HYPERTENSION: ICD-10-CM

## 2025-01-22 DIAGNOSIS — I73.9 PAD (PERIPHERAL ARTERY DISEASE): ICD-10-CM

## 2025-01-22 DIAGNOSIS — I48.0 PAROXYSMAL ATRIAL FIBRILLATION: ICD-10-CM

## 2025-01-22 DIAGNOSIS — E78.2 MIXED HYPERLIPIDEMIA: ICD-10-CM

## 2025-01-22 PROCEDURE — 1159F MED LIST DOCD IN RCRD: CPT | Performed by: NURSE PRACTITIONER

## 2025-01-22 PROCEDURE — 99214 OFFICE O/P EST MOD 30 MIN: CPT | Performed by: NURSE PRACTITIONER

## 2025-01-22 PROCEDURE — 3078F DIAST BP <80 MM HG: CPT | Performed by: NURSE PRACTITIONER

## 2025-01-22 PROCEDURE — 93000 ELECTROCARDIOGRAM COMPLETE: CPT | Performed by: NURSE PRACTITIONER

## 2025-01-22 PROCEDURE — 3074F SYST BP LT 130 MM HG: CPT | Performed by: NURSE PRACTITIONER

## 2025-01-22 PROCEDURE — 1160F RVW MEDS BY RX/DR IN RCRD: CPT | Performed by: NURSE PRACTITIONER

## 2025-01-22 RX ORDER — ATORVASTATIN CALCIUM 40 MG/1
40 TABLET, FILM COATED ORAL NIGHTLY
Qty: 30 TABLET | Refills: 11 | Status: SHIPPED | OUTPATIENT
Start: 2025-01-22 | End: 2025-01-22 | Stop reason: SDUPTHER

## 2025-01-22 RX ORDER — ATORVASTATIN CALCIUM 40 MG/1
40 TABLET, FILM COATED ORAL NIGHTLY
Qty: 90 TABLET | Refills: 3 | Status: SHIPPED | OUTPATIENT
Start: 2025-01-22

## 2025-01-22 RX ORDER — FAMOTIDINE 40 MG/1
40 TABLET, FILM COATED ORAL DAILY
COMMUNITY

## 2025-01-22 NOTE — PROGRESS NOTES
Subjective:     Encounter Date: 1/22/2025      Patient ID: Fabian Velez Sr. is a 84 y.o. male.    Chief Complaint: Follow-up CAD    History of Present Illness    Mr. Velez presents for follow up.     Cath 4/15/2024 revealed an 80% stenosis, and a large caliber ramus intermediate branch. It was successfully treated with 3.0 x 23 mm drug-eluting stent. Prior to that, he was having persistent episodes of chest pain, partially relieved by maximally tolerated antianginal medicine. He had a stress test that was low risk for ischemia, but suspected balanced ischemia, and proceeded with the catheterization, particularly when symptoms worsen again after initial improvement on antianginal therapy. He also did wear a 2-week ZIO patch monitor placed on 04/17/2024, that showed atrial fibrillation with an overall burden of less than 1%. The longest episode was 89 minutes, but all others were 10 minutes or less. The patient never triggered the device during his symptoms, but he did report chest pain 14 times. He denied any chest pain occurring in conjunction with arrhythmias.    The patient followed up with Dr. Lala May 2024.  He was doing well.  Exertional dyspnea had improved.  He denied chest pain or palpitations.  He was describing lower extremity claudication, worse on the right.  Dr. Lala ordered ABIs.  These were abnormal and this led to a CTA, which led to referral to Dr. Ba for his PAD, particularly in the right lower extremity.    He underwent right lower extremity revascularization with Dr. Ba in August 2024.    Today the patient presents for follow-up and states he continues to feel well.  His right leg pain has resolved.  He denies any chest discomfort or palpitations.  He admits generalized weakness.  He ambulates with a cane.  He has mild exertional dyspnea but confirms that this did improve after PCI and has not worsened since that time.  He denies edema, orthopnea.  He states he has had some  falls.  He bruises easily.  He is interested in stopping blood thinners if he can.      The following portions of the patient's history were reviewed and updated as appropriate: allergies, current medications, past family history, past medical history, past social history, past surgical history, and problem list.    Review of Systems   Constitutional: Negative for malaise/fatigue.   Cardiovascular:  Positive for dyspnea on exertion. Negative for chest pain, claudication, leg swelling, near-syncope, orthopnea, palpitations, paroxysmal nocturnal dyspnea and syncope.   Respiratory:  Negative for cough.    Hematologic/Lymphatic: Does not bruise/bleed easily.   Musculoskeletal:  Positive for falls.   Gastrointestinal:  Negative for bloating.   Neurological:  Positive for weakness. Negative for dizziness and light-headedness.           Current Outpatient Medications:     acetaminophen (TYLENOL) 500 MG tablet, Take 2 tablets by mouth Every 6 (Six) Hours As Needed for Mild Pain., Disp: , Rfl:     amiodarone (PACERONE) 200 MG tablet, Take 200 mg daily., Disp: 90 tablet, Rfl: 3    apixaban (ELIQUIS) 2.5 MG tablet tablet, Take 1 tablet by mouth Every 12 (Twelve) Hours. Indications: Atrial Fibrillation, Disp: 60 tablet, Rfl: 0    atorvastatin (LIPITOR) 40 MG tablet, Take 1 tablet by mouth Every Night., Disp: 90 tablet, Rfl: 3    Cholecalciferol (VITAMIN D3 PO), Take 1 tablet by mouth Daily., Disp: , Rfl:     clopidogrel (PLAVIX) 75 MG tablet, Take 1 tablet by mouth Daily., Disp: 90 tablet, Rfl: 3    dilTIAZem CD (CARDIZEM CD) 360 MG 24 hr capsule, Take 1 capsule by mouth Daily., Disp: 90 capsule, Rfl: 3    famotidine (PEPCID) 40 MG tablet, Take 1 tablet by mouth Daily., Disp: , Rfl:     nitroglycerin (NITROSTAT) 0.4 MG SL tablet, Place 1 tablet under the tongue Every 5 (Five) Minutes As Needed for Chest Pain (Systolic BP Greater Than 100). Take no more than 3 doses in 15 minutes., Disp: 25 tablet, Rfl: 1    tamsulosin (FLOMAX)  0.4 MG capsule 24 hr capsule, Take 1 capsule by mouth Daily., Disp: 90 capsule, Rfl: 3    traMADol (ULTRAM) 50 MG tablet, Take 1 tablet by mouth Every 8 (Eight) Hours As Needed for Moderate Pain., Disp: , Rfl:     VITAMIN E PO, Take 1 tablet by mouth Daily., Disp: , Rfl:        Objective:      Vitals:    01/22/25 1036   BP: 114/66   Pulse: 81   SpO2: 93%   Weight - 156 lbs    Vitals and nursing note reviewed.   Constitutional:       General: Not in acute distress.     Appearance: Well-developed and not in distress. Not diaphoretic.   Neck:      Vascular: No JVD or JVR. JVD normal.   Pulmonary:      Effort: Pulmonary effort is normal. No respiratory distress.      Breath sounds: Normal breath sounds.   Cardiovascular:      Normal rate. Regular rhythm.      Murmurs: There is no murmur.   Edema:     Peripheral edema absent.   Abdominal:      Tenderness: There is no abdominal tenderness.   Skin:     General: Skin is warm and dry.   Neurological:      Mental Status: Alert, oriented to person, place, and time and oriented to person, place and time.         Lab Review:   Lab Results   Component Value Date    GLUCOSE 124 (H) 10/07/2024    BUN 27 (H) 10/07/2024    CREATININE 2.09 (H) 10/07/2024     (L) 10/07/2024    K 3.8 10/07/2024     10/07/2024    CALCIUM 10.4 10/07/2024    PROTEINTOT 6.6 10/07/2024    ALBUMIN 3.6 10/07/2024    ALT 34 10/07/2024    AST 37 10/07/2024    ALKPHOS 365 (H) 10/07/2024    BILITOT 0.6 10/07/2024    GLOB 3.0 10/07/2024    AGRATIO 1.2 10/07/2024    BCR 12.9 10/07/2024    ANIONGAP 8.0 10/07/2024    EGFR 30.8 (L) 10/07/2024        Lab Results   Component Value Date    CHOL 138 04/16/2024    TRIG 72 04/16/2024    HDL 39 (L) 04/16/2024    LDL 85 04/16/2024        Lab Results   Component Value Date    HGBA1C 5.50 04/16/2024            ECG 12 Lead    Date/Time: 1/22/2025 3:03 PM  Performed by: Jenny Linares APRN    Authorized by: Jenny Linares APRN  Comparison: compared with previous ECG  from 10/7/2024  Similar to previous ECG  Rhythm: sinus rhythm  BPM: 81  Conduction: right bundle branch block    Clinical impression: abnormal EKG            Assessment/Plan:     Problem List Items Addressed This Visit (all established and stable, except for otherwise noted)      Atrial fibrillation    Overview     Diagnosed Jan '20 - was symptomatic at time of diagnosis with AF/RVR (rate 150s) - was light-headed    CHADS-VASc Risk Assessment              3       Total Score        1 Hypertension    2 Age >/= 75        Criteria that do not apply:    CHF    DM    PRIOR STROKE/TIA/THROMBO    Vascular Disease    Age 65-74    Sex: Female                   Hypertension    Hyperlipidemia    Coronary artery disease of native artery of native heart without angina pectoris - Primary    Overview     4/15/24: C with 80% ramus lesion -> tx with 3.0x23 DARIA and symptoms resolved          Other Visit Diagnoses     PAD- followed by Dr. Ba                 Recommendations/plans:    1. Coronary artery disease: Stable, no chest pain.  Also, exertional dyspnea improved significantly after PCI in April 2024.    -From a cardiology standpoint, at this point he can transition from Plavix to aspirin 81 mg daily and then discontinue aspirin and continue Eliquis alone in April 2025.  However, given his PAD with intervention in August, I will touch base with Dr. Ba as well.  -He is without his high intensity statin for unclear reasons.  I will resume atorvastatin 40 mg today.      2. Paroxysmal atrial fibrillation: Patient was hospitalized for two nights after PCI because of difficult to control rates with significant burden of paroxysmal atrial fibrillation. He was started on amiodarone then to suppress this, and has been tolerating oral amiodarone at home. Subsequent monitor showed very low burden of overall less than 1% of atrial fibrillation.   - Continue amiodarone 200 mg daily for now; as previously outlined by Dr. Lala, May  consider discontinuation in near future if no significant burden of recurrent AF  - Continue Eliquis indefinitely for CVA prophylaxis.  Given his falls we discussed Watchman device implantation as an alternative to stroke prophylaxis, he is not interested at this time but he will call back if he would like to move forward with a structural clinic visit with Dr. Lala.    3. Essential hypertension: Well controlled.     4. Mixed hyperlipidemia: Goal LDL <70.  Resume high intensity statin.      Follow-up 3 to 6 months with Dr. Lala, call sooner with symptoms or concerns.

## 2025-01-27 RX ORDER — ASPIRIN 81 MG/1
81 TABLET ORAL DAILY
Qty: 30 TABLET | Refills: 11 | Status: SHIPPED | OUTPATIENT
Start: 2025-01-27

## 2025-01-28 ENCOUNTER — TELEPHONE (OUTPATIENT)
Dept: CARDIOLOGY | Facility: CLINIC | Age: 85
End: 2025-01-28
Payer: MEDICARE

## 2025-01-28 NOTE — TELEPHONE ENCOUNTER
----- Message from Jenny Linares sent at 1/27/2025  2:57 PM CST -----  Please let the patient know I discussed his case with Dr. Ba and he can stop taking Plavix and start aspirin 81 mg daily.  Continue Eliquis.  Please save note to chart.  Thanks.  ----- Message -----  From: Isaac Ba DO  Sent: 1/27/2025   2:34 PM CST  To: TAI Owen    Aspirin and Eliquis are fine with me... Thank you  ----- Message -----  From: Jenny Linares APRN  Sent: 1/22/2025   3:29 PM CST  To: Isaac Ba DO; Jeremie Lala MD    From a cardiology standpoint it is time for him to transition from Plavix to aspirin 81 mg daily and remain on Eliquis.  Later in April 2025 the plan was to stop aspirin and continue Eliquis alone.  However, I wanted to check with you as he recently underwent right lower extremity revascularization in August, before I adjust his blood thinners for his coronary disease.  Thank you

## 2025-02-11 ENCOUNTER — TELEPHONE (OUTPATIENT)
Dept: VASCULAR SURGERY | Facility: CLINIC | Age: 85
End: 2025-02-11
Payer: MEDICARE

## 2025-02-24 ENCOUNTER — TELEPHONE (OUTPATIENT)
Dept: NEUROLOGY | Facility: CLINIC | Age: 85
End: 2025-02-24

## 2025-02-24 NOTE — TELEPHONE ENCOUNTER
PT WAS SCHEDULED TO SEE DR. CARDOZA TODAY 2/24/2025 FOR NP- SEEN IN ED GENERALIZED WEAKNESS BLE , AND FREQUENT FALLS    PT CALLED AND DOES NOT WANT TO BE RESCHEDULED     CC

## 2025-04-23 ENCOUNTER — OFFICE VISIT (OUTPATIENT)
Dept: CARDIOLOGY | Facility: CLINIC | Age: 85
End: 2025-04-23
Payer: MEDICARE

## 2025-04-23 VITALS
BODY MASS INDEX: 21.81 KG/M2 | SYSTOLIC BLOOD PRESSURE: 104 MMHG | DIASTOLIC BLOOD PRESSURE: 66 MMHG | OXYGEN SATURATION: 94 % | HEIGHT: 72 IN | HEART RATE: 71 BPM | WEIGHT: 161 LBS

## 2025-04-23 DIAGNOSIS — I73.9 PAD (PERIPHERAL ARTERY DISEASE): ICD-10-CM

## 2025-04-23 DIAGNOSIS — I48.0 PAROXYSMAL ATRIAL FIBRILLATION: Primary | ICD-10-CM

## 2025-04-23 DIAGNOSIS — E78.2 MIXED HYPERLIPIDEMIA: ICD-10-CM

## 2025-04-23 DIAGNOSIS — I10 PRIMARY HYPERTENSION: ICD-10-CM

## 2025-04-23 DIAGNOSIS — I25.10 CORONARY ARTERY DISEASE INVOLVING NATIVE CORONARY ARTERY OF NATIVE HEART WITHOUT ANGINA PECTORIS: ICD-10-CM

## 2025-04-23 PROCEDURE — 93000 ELECTROCARDIOGRAM COMPLETE: CPT | Performed by: NURSE PRACTITIONER

## 2025-04-23 PROCEDURE — 1160F RVW MEDS BY RX/DR IN RCRD: CPT | Performed by: NURSE PRACTITIONER

## 2025-04-23 PROCEDURE — 1159F MED LIST DOCD IN RCRD: CPT | Performed by: NURSE PRACTITIONER

## 2025-04-23 PROCEDURE — 99214 OFFICE O/P EST MOD 30 MIN: CPT | Performed by: NURSE PRACTITIONER

## 2025-04-23 PROCEDURE — 3074F SYST BP LT 130 MM HG: CPT | Performed by: NURSE PRACTITIONER

## 2025-04-23 PROCEDURE — 3078F DIAST BP <80 MM HG: CPT | Performed by: NURSE PRACTITIONER

## 2025-04-23 NOTE — PROGRESS NOTES
Subjective:     Encounter Date: 4/23/2025      Patient ID: Fabian Velez Sr. is a 84 y.o. male.    Chief Complaint: Follow-up CAD, PAF    History of Present Illness    Mr. Velez presents for follow up.     Cath 4/15/2024 revealed an 80% stenosis, and a large caliber ramus intermediate branch. It was successfully treated with 3.0 x 23 mm drug-eluting stent. Prior to that, he was having persistent episodes of chest pain, partially relieved by maximally tolerated antianginal medicine. He had a stress test that was low risk for ischemia, but suspected balanced ischemia, and proceeded with the catheterization, particularly when symptoms worsen again after initial improvement on antianginal therapy. He also did wear a 2-week ZIO patch monitor placed on 04/17/2024, that showed atrial fibrillation with an overall burden of less than 1%. The longest episode was 89 minutes, but all others were 10 minutes or less. The patient never triggered the device during his symptoms, but he did report chest pain 14 times. He denied any chest pain occurring in conjunction with arrhythmias.    The patient followed up with Dr. Lala May 2024.  He was doing well.  Exertional dyspnea had improved.  He denied chest pain or palpitations.  He was describing lower extremity claudication, worse on the right.  Dr. Lala ordered ABIs.  These were abnormal and this led to a CTA, which led to referral to Dr. Ba for his PAD, particularly in the right lower extremity.    He underwent right lower extremity revascularization with Dr. Ba in August 2024.    I saw the patient in January 2025 and he was doing well.  Right leg pain resolved.  He was not having any chest pain or shortness of breath.  He did have generalized weakness.  Mild exertional dyspnea was persistent but he confirmed this did improve after PCI and had not worsened since that time.  He had had some falls.  We discussed Watchman device implantation at that time but he was  not interested.    After discussion with Dr. Ba we discontinued his Plavix, started aspirin, and continued Eliquis.    Today the patient presents for follow-up and states he is feeling well/about the same.  He has chronic balance problems and ambulates with a cane.  He has mild exertional dyspnea that has not worsened since PCI.  He recalls 2 episodes of palpitations since last visit that lasted about 5 minutes each.  He denies chest pain, edema, orthopnea, PND.  Blood pressure is well-controlled.  He denies bleeding problems.    The following portions of the patient's history were reviewed and updated as appropriate: allergies, current medications, past family history, past medical history, past social history, past surgical history, and problem list.    Review of Systems   Constitutional: Negative for malaise/fatigue.   Cardiovascular:  Positive for dyspnea on exertion. Negative for chest pain, claudication, leg swelling, near-syncope, orthopnea, palpitations, paroxysmal nocturnal dyspnea and syncope.   Respiratory:  Negative for cough.    Hematologic/Lymphatic: Does not bruise/bleed easily.   Musculoskeletal:  Negative for falls.   Gastrointestinal:  Negative for bloating.   Neurological:  Positive for loss of balance and weakness. Negative for dizziness and light-headedness.           Current Outpatient Medications:     acetaminophen (TYLENOL) 500 MG tablet, Take 2 tablets by mouth Every 6 (Six) Hours As Needed for Mild Pain., Disp: , Rfl:     amiodarone (PACERONE) 200 MG tablet, Take 200 mg daily., Disp: 90 tablet, Rfl: 3    apixaban (ELIQUIS) 2.5 MG tablet tablet, Take 1 tablet by mouth Every 12 (Twelve) Hours. Indications: Atrial Fibrillation, Disp: 60 tablet, Rfl: 0    aspirin 81 MG EC tablet, Take 1 tablet by mouth Daily., Disp: 30 tablet, Rfl: 11    atorvastatin (LIPITOR) 40 MG tablet, Take 1 tablet by mouth Every Night., Disp: 90 tablet, Rfl: 3    Cholecalciferol (VITAMIN D3 PO), Take 1 tablet by  mouth Daily., Disp: , Rfl:     dilTIAZem CD (CARDIZEM CD) 360 MG 24 hr capsule, Take 1 capsule by mouth Daily., Disp: 90 capsule, Rfl: 3    famotidine (PEPCID) 40 MG tablet, Take 1 tablet by mouth Daily., Disp: , Rfl:     nitroglycerin (NITROSTAT) 0.4 MG SL tablet, Place 1 tablet under the tongue Every 5 (Five) Minutes As Needed for Chest Pain (Systolic BP Greater Than 100). Take no more than 3 doses in 15 minutes., Disp: 25 tablet, Rfl: 1    tamsulosin (FLOMAX) 0.4 MG capsule 24 hr capsule, Take 1 capsule by mouth Daily., Disp: 90 capsule, Rfl: 3    traMADol (ULTRAM) 50 MG tablet, Take 1 tablet by mouth Every 8 (Eight) Hours As Needed for Moderate Pain., Disp: , Rfl:     VITAMIN E PO, Take 1 tablet by mouth Daily., Disp: , Rfl:        Objective:      Vitals:    04/23/25 1102   BP: 104/66   Pulse: 71   SpO2: 94%   Weight - 161 lbs    Vitals and nursing note reviewed.   Constitutional:       General: Not in acute distress.     Appearance: Well-developed and not in distress. Frail. Not diaphoretic.   Neck:      Vascular: No JVD or JVR. JVD normal.   Pulmonary:      Effort: Pulmonary effort is normal. No respiratory distress.      Breath sounds: Normal breath sounds.   Cardiovascular:      Normal rate. Regular rhythm.      Murmurs: There is no murmur.   Edema:     Peripheral edema absent.   Abdominal:      Tenderness: There is no abdominal tenderness.   Skin:     General: Skin is warm and dry.   Neurological:      Mental Status: Alert, oriented to person, place, and time and oriented to person, place and time.         Lab Review:   Lab Results   Component Value Date    GLUCOSE 124 (H) 10/07/2024    BUN 27 (H) 10/07/2024    CREATININE 2.09 (H) 10/07/2024     (L) 10/07/2024    K 3.8 10/07/2024     10/07/2024    CALCIUM 10.4 10/07/2024    PROTEINTOT 6.6 10/07/2024    ALBUMIN 3.6 10/07/2024    ALT 34 10/07/2024    AST 37 10/07/2024    ALKPHOS 365 (H) 10/07/2024    BILITOT 0.6 10/07/2024    GLOB 3.0 10/07/2024     AGRATIO 1.2 10/07/2024    BCR 12.9 10/07/2024    ANIONGAP 8.0 10/07/2024    EGFR 30.8 (L) 10/07/2024        Lab Results   Component Value Date    CHOL 138 04/16/2024    TRIG 72 04/16/2024    HDL 39 (L) 04/16/2024    LDL 85 04/16/2024        Lab Results   Component Value Date    HGBA1C 5.50 04/16/2024            ECG 12 Lead    Date/Time: 4/23/2025 11:27 AM  Performed by: Jenny Linares APRN    Authorized by: Jenny Linares APRN  Comparison: compared with previous ECG from 1/22/2025  Similar to previous ECG  Rhythm: sinus rhythm  BPM: 71  Conduction: right bundle branch block    Clinical impression: abnormal EKG            Assessment/Plan:     Problem List Items Addressed This Visit (all established and stable, except for otherwise noted)      Atrial fibrillation    Overview     Diagnosed Jan '20 - was symptomatic at time of diagnosis with AF/RVR (rate 150s) - was light-headed    CHADS-VASc Risk Assessment              3       Total Score        1 Hypertension    2 Age >/= 75        Criteria that do not apply:    CHF    DM    PRIOR STROKE/TIA/THROMBO    Vascular Disease    Age 65-74    Sex: Female                   Hypertension    Hyperlipidemia    Coronary artery disease of native artery of native heart without angina pectoris - Primary    Overview     4/15/24: LHC with 80% ramus lesion -> tx with 3.0x23 DARIA and symptoms resolved          Other Visit Diagnoses     PAD- followed by Dr. aB                 Recommendations/plans:    1. Coronary artery disease: Stable, no chest pain.  Also, exertional dyspnea improved significantly after PCI in April 2024.    - From a cardiology standpoint, okay to discontinue aspirin-he will need to discuss this with Dr. Ba.  If Eliquis is ever held for surgeries or procedures he will need to take aspirin 81 mg daily on the days he is without Eliquis.  - Continue high intensity statin      2. Paroxysmal atrial fibrillation: Patient was hospitalized for two nights after PCI  because of difficult to control rates with significant burden of paroxysmal atrial fibrillation. He was started on amiodarone then to suppress this, and has been tolerating oral amiodarone at home. Subsequent monitor showed very low burden of overall less than 1% of atrial fibrillation.     - Repeat 14-day Holter monitor.  If no significant burden of atrial fibrillation, we will plan to discontinue amiodarone as previously recommended by Dr. Lala    - Continue Eliquis for CVA prophylaxis.  Given his prior falls we discussed Watchman device implantation as an alternative to stroke prophylaxis, he is not interested at this time but he will call back if he would like to move forward with a structural clinic visit with Dr. Lala.    3. Essential hypertension: Well controlled.     4. Mixed hyperlipidemia: Goal LDL <70.  Continue high intensity statin.      Follow-up  6 months with Dr. Lala, call sooner with symptoms or concerns.

## 2025-05-01 ENCOUNTER — TELEPHONE (OUTPATIENT)
Dept: VASCULAR SURGERY | Facility: CLINIC | Age: 85
End: 2025-05-01
Payer: MEDICARE

## 2025-05-01 NOTE — TELEPHONE ENCOUNTER
CALLED PATIENT TO GET HIS TESTING AND FU WITH JESSICA PEPE/S AS THE TESTING WAS SHOWING OVERDUE IN OUR SYSTEM AND LAST APPOINTMENT WAS CANCELED. PATIENT DID NOT ANSWER, LEFT A DETAILED VOICEMAIL WITH A REQUEST FOR A RETURN CALL.

## 2025-05-16 LAB
CV ZIO AF AFL AVG BPM: 134 BPM
CV ZIO AF AFL BPM HIGH: 189 BPM
CV ZIO AF AFL BPM LOW: 70 BPM
CV ZIO AF AFL F EPI AVG BPM: 136 BPM
CV ZIO AF AFL F EPI BPM HIGH: 189 BPM
CV ZIO AF AFL F EPI BPM LOW: 102 BPM
CV ZIO AF AFL F EPI DT: NORMAL
CV ZIO AF AFL L EPI AVG BPM: 136 BPM
CV ZIO AF AFL L EPI BPM HIGH: 189 BPM
CV ZIO AF AFL L EPI BPM LOW: 102 BPM
CV ZIO AF AFL L EPI DUR: 3488.5
CV ZIO AF AFL L EPI END: NORMAL
CV ZIO AF AFL L EPI START: NORMAL
CV ZIO AF AFL PERCENT: 4 %
CV ZIO AF AFL S EPI AVG BPM: 96 BPM
CV ZIO AF AFL S EPI BPM HIGH: 138 BPM
CV ZIO AF AFL S EPI BPM LOW: 70 BPM
CV ZIO AF AFL S EPI DT: NORMAL
CV ZIO AF AFL SYMPT IN PT: NORMAL
CV ZIO BASELINE AVG BPM: 73 BPM
CV ZIO BASELINE BPM HIGH: 189 BPM
CV ZIO BASELINE BPM LOW: 51 BPM
CV ZIO DEVICE ANALYSIS TIME: NORMAL
CV ZIO ECT SVE COUNT: 4877 EPISODES
CV ZIO ECT SVE CPLT COUNT: 635 EPISODES
CV ZIO ECT SVE CPLT FREQ: NORMAL
CV ZIO ECT SVE FREQ: NORMAL
CV ZIO ECT SVE TPLT COUNT: 405 EPISODES
CV ZIO ECT SVE TPLT FREQ: NORMAL
CV ZIO ECT VE COUNT: 8425 EPISODES
CV ZIO ECT VE CPLT COUNT: 0 EPISODES
CV ZIO ECT VE CPLT FREQ: 0
CV ZIO ECT VE FREQ: NORMAL
CV ZIO ECT VE TPLT COUNT: 0 EPISODES
CV ZIO ECT VE TPLT FREQ: 0
CV ZIO ECTOPIC SVE COUPLET RAW PERCENT: 0.09 %
CV ZIO ECTOPIC SVE ISOLATED PERCENT: 0.34 %
CV ZIO ECTOPIC SVE TRIPLET RAW PERCENT: 0.08 %
CV ZIO ECTOPIC VE COUPLET RAW PERCENT: 0 %
CV ZIO ECTOPIC VE ISOLATED PERCENT: 0.58 %
CV ZIO ECTOPIC VE TRIPLET RAW PERCENT: 0 %
CV ZIO ENROLLMENT END: NORMAL
CV ZIO ENROLLMENT START: NORMAL
CV ZIO IRREG TYPE: NORMAL
CV ZIO PATIENT EVENTS DIARIES: 3
CV ZIO PATIENT EVENTS TRIGGERS: 3
CV ZIO PAUSE COUNT: 0
CV ZIO PRESCRIPTION STATUS: NORMAL
CV ZIO SVT COUNT: 0
CV ZIO TOTAL  ENROLLMENT PERIOD: NORMAL
CV ZIO VT COUNT: 0

## 2025-05-27 RX ORDER — DILTIAZEM HYDROCHLORIDE 360 MG/1
360 CAPSULE, EXTENDED RELEASE ORAL DAILY
Qty: 90 CAPSULE | Refills: 3 | Status: SHIPPED | OUTPATIENT
Start: 2025-05-27

## 2025-07-03 RX ORDER — AMIODARONE HYDROCHLORIDE 200 MG/1
200 TABLET ORAL DAILY
Qty: 90 TABLET | Refills: 3 | Status: SHIPPED | OUTPATIENT
Start: 2025-07-03

## (undated) DEVICE — Device

## (undated) DEVICE — DEV TORQ GW HOT/PINK

## (undated) DEVICE — AIRLIFE™ NASAL OXYGEN CANNULA CURVED, NONFLARED TIP, WITH 7' FEET (2.1 M) CRUSH RESISTANT TUBING, OVER-THE-EAR STYLE: Brand: AIRLIFE™

## (undated) DEVICE — DEV EPS SPIDERFX 5MM OTW320/RX190CM

## (undated) DEVICE — NAVICROSS SUPPORT CATHETER: Brand: NAVICROSS

## (undated) DEVICE — INFLATION DEVICE: Brand: ENCORE™ 26

## (undated) DEVICE — CANN NASL ETCO2 LO/FLO A/

## (undated) DEVICE — DRSNG SURESITE WNDW 4X4.5

## (undated) DEVICE — SOLIDIFIER LIQUI LOC PLUS 2000CC

## (undated) DEVICE — SOL IRR NACL 0.9PCT BT 1000ML

## (undated) DEVICE — DESTINATION PERIPHERAL GUIDING SHEATH: Brand: DESTINATION

## (undated) DEVICE — PAD, DEFIB, ADULT, RADIOTRANS, PHYSIO: Brand: MEDLINE

## (undated) DEVICE — PAD ENDOVASCULAR: Brand: MEDLINE INDUSTRIES, INC.

## (undated) DEVICE — DRAPE,ANGIO,BRACH,STERILE,38X44: Brand: MEDLINE

## (undated) DEVICE — BALN EVERCROSS OTW .035 5F 6X60 135

## (undated) DEVICE — ST MIC/INTRO ACC SHRP/NDL TUNG/TP NITNL 5F 45CM 7CM

## (undated) DEVICE — PINNACLE R/O II INTRODUCER SHEATH WITH RADIOPAQUE MARKER: Brand: PINNACLE

## (undated) DEVICE — GLV SURG BIOGEL LTX PF 7 1/2

## (undated) DEVICE — DEV CLS VASC MYNXCONTROL 6FTO7F

## (undated) DEVICE — RADIFOCUS OPTITORQUE ANGIOGRAPHIC CATHETER: Brand: OPTITORQUE

## (undated) DEVICE — RADIFOCUS GLIDEWIRE ADVANTAGE GUIDEWIRE: Brand: GLIDEWIRE ADVANTAGE

## (undated) DEVICE — GLIDESHEATH SLENDER STAINLESS STEEL KIT: Brand: GLIDESHEATH SLENDER

## (undated) DEVICE — TR BAND RADIAL ARTERY COMPRESSION DEVICE: Brand: TR BAND

## (undated) DEVICE — CATH F6INF PIG 145 110CM 6SH: Brand: INFINITI

## (undated) DEVICE — GUIDELINER CATHETERS ARE INTENDED TO BE USED IN CONJUNCTION WITH GUIDE CATHETERS TO ACCESS DISCRETE REGIONS OF THE CORONARY AND/OR PERIPHERAL VASCULATURE, AND TO FACILITATE PLACEMENT OF INTERVENTIONAL DEVICES.: Brand: GUIDELINER® V3 CATHETER

## (undated) DEVICE — KT VLV HEMO MAP ACC PLS LG/BORE MTL/INTRO

## (undated) DEVICE — INTENDED FOR TISSUE SEPARATION, AND OTHER PROCEDURES THAT REQUIRE A SHARP SURGICAL BLADE TO PUNCTURE OR CUT.: Brand: BARD-PARKER ®  SAFETY SCALPED

## (undated) DEVICE — SUP ARMBRD ART/LINE BLU

## (undated) DEVICE — Device: Brand: MEDEX

## (undated) DEVICE — 6F .070 JL3.5 100CM: Brand: CORDIS

## (undated) DEVICE — GAUZE,SPONGE,2"X2",8PLY,STERILE,LF,2'S: Brand: MEDLINE

## (undated) DEVICE — PK CATH CARD 30 CA/4

## (undated) DEVICE — Device: Brand: PROWATER

## (undated) DEVICE — MINI TREK CORONARY DILATATION CATHETER 2.0 MM X 15 MM / RAPID-EXCHANGE: Brand: MINI TREK

## (undated) DEVICE — 3 ML SYRINGE WITH STANDARD HYPODERMIC NEEDLE: Brand: MONOJECT